# Patient Record
Sex: FEMALE | Race: WHITE | Employment: FULL TIME | URBAN - NONMETROPOLITAN AREA
[De-identification: names, ages, dates, MRNs, and addresses within clinical notes are randomized per-mention and may not be internally consistent; named-entity substitution may affect disease eponyms.]

---

## 2017-01-11 DIAGNOSIS — E78.2 MIXED HYPERLIPIDEMIA: ICD-10-CM

## 2017-01-11 RX ORDER — HYDROCODONE BITARTRATE AND ACETAMINOPHEN 5; 325 MG/1; MG/1
1 TABLET ORAL 2 TIMES DAILY PRN
Qty: 60 TABLET | Refills: 0 | Status: SHIPPED | OUTPATIENT
Start: 2017-01-11 | End: 2017-02-10 | Stop reason: SDUPTHER

## 2017-01-11 RX ORDER — SIMVASTATIN 20 MG
20 TABLET ORAL NIGHTLY
Qty: 30 TABLET | Refills: 5 | Status: CANCELLED | OUTPATIENT
Start: 2017-01-11

## 2017-01-11 RX ORDER — ALPRAZOLAM 1 MG/1
1 TABLET ORAL 2 TIMES DAILY PRN
Qty: 60 TABLET | Refills: 0 | Status: SHIPPED | OUTPATIENT
Start: 2017-01-11 | End: 2017-02-10 | Stop reason: SDUPTHER

## 2017-02-10 RX ORDER — ALPRAZOLAM 1 MG/1
1 TABLET ORAL 2 TIMES DAILY PRN
Qty: 60 TABLET | Refills: 0 | Status: SHIPPED | OUTPATIENT
Start: 2017-02-10 | End: 2017-03-10 | Stop reason: SDUPTHER

## 2017-02-10 RX ORDER — HYDROCODONE BITARTRATE AND ACETAMINOPHEN 5; 325 MG/1; MG/1
1 TABLET ORAL 2 TIMES DAILY PRN
Qty: 60 TABLET | Refills: 0 | Status: SHIPPED | OUTPATIENT
Start: 2017-02-10 | End: 2017-03-10 | Stop reason: SDUPTHER

## 2017-03-10 RX ORDER — ALPRAZOLAM 1 MG/1
1 TABLET ORAL 2 TIMES DAILY PRN
Qty: 60 TABLET | Refills: 0 | Status: SHIPPED | OUTPATIENT
Start: 2017-03-10 | End: 2017-04-11 | Stop reason: SDUPTHER

## 2017-03-10 RX ORDER — HYDROCODONE BITARTRATE AND ACETAMINOPHEN 5; 325 MG/1; MG/1
1 TABLET ORAL 2 TIMES DAILY PRN
Qty: 60 TABLET | Refills: 0 | Status: SHIPPED | OUTPATIENT
Start: 2017-03-10 | End: 2017-04-10 | Stop reason: SDUPTHER

## 2017-03-31 ENCOUNTER — OFFICE VISIT (OUTPATIENT)
Dept: FAMILY MEDICINE CLINIC | Age: 52
End: 2017-03-31
Payer: COMMERCIAL

## 2017-03-31 VITALS
WEIGHT: 165 LBS | SYSTOLIC BLOOD PRESSURE: 116 MMHG | TEMPERATURE: 98.1 F | DIASTOLIC BLOOD PRESSURE: 70 MMHG | HEART RATE: 68 BPM | HEIGHT: 61 IN | BODY MASS INDEX: 31.15 KG/M2

## 2017-03-31 DIAGNOSIS — F33.1 MODERATE EPISODE OF RECURRENT MAJOR DEPRESSIVE DISORDER (HCC): Primary | ICD-10-CM

## 2017-03-31 DIAGNOSIS — R53.83 FATIGUE DUE TO DEPRESSION: ICD-10-CM

## 2017-03-31 DIAGNOSIS — J06.9 ACUTE URI: ICD-10-CM

## 2017-03-31 DIAGNOSIS — Z12.4 ENCOUNTER FOR PAP CERVICAL SMEAR FOLLOWING PRIOR ABNORMAL SMEAR: ICD-10-CM

## 2017-03-31 DIAGNOSIS — F32.A FATIGUE DUE TO DEPRESSION: ICD-10-CM

## 2017-03-31 PROCEDURE — 99214 OFFICE O/P EST MOD 30 MIN: CPT | Performed by: FAMILY MEDICINE

## 2017-03-31 RX ORDER — PAROXETINE HYDROCHLORIDE 20 MG/1
20 TABLET, FILM COATED ORAL DAILY
Qty: 30 TABLET | Refills: 5 | Status: SHIPPED | OUTPATIENT
Start: 2017-03-31 | End: 2017-07-12 | Stop reason: SDUPTHER

## 2017-03-31 ASSESSMENT — ENCOUNTER SYMPTOMS
GASTROINTESTINAL NEGATIVE: 1
COUGH: 1
SORE THROAT: 1
ALLERGIC/IMMUNOLOGIC NEGATIVE: 1
EYES NEGATIVE: 1
RHINORRHEA: 1

## 2017-04-10 RX ORDER — ALPRAZOLAM 1 MG/1
1 TABLET ORAL 2 TIMES DAILY PRN
Qty: 60 TABLET | Refills: 0 | Status: CANCELLED | OUTPATIENT
Start: 2017-04-10

## 2017-04-10 RX ORDER — HYDROCODONE BITARTRATE AND ACETAMINOPHEN 5; 325 MG/1; MG/1
1 TABLET ORAL 2 TIMES DAILY PRN
Qty: 60 TABLET | Refills: 0 | Status: SHIPPED | OUTPATIENT
Start: 2017-04-10 | End: 2017-05-09 | Stop reason: SDUPTHER

## 2017-04-12 RX ORDER — ALPRAZOLAM 1 MG/1
1 TABLET ORAL 2 TIMES DAILY PRN
Qty: 60 TABLET | Refills: 0 | Status: SHIPPED | OUTPATIENT
Start: 2017-04-12 | End: 2017-05-09 | Stop reason: SDUPTHER

## 2017-05-09 RX ORDER — ALPRAZOLAM 1 MG/1
1 TABLET ORAL 2 TIMES DAILY PRN
Qty: 60 TABLET | Refills: 0 | Status: SHIPPED | OUTPATIENT
Start: 2017-05-09 | End: 2017-06-09 | Stop reason: SDUPTHER

## 2017-05-09 RX ORDER — HYDROCODONE BITARTRATE AND ACETAMINOPHEN 5; 325 MG/1; MG/1
1 TABLET ORAL 2 TIMES DAILY PRN
Qty: 60 TABLET | Refills: 0 | Status: SHIPPED | OUTPATIENT
Start: 2017-05-09 | End: 2017-06-09 | Stop reason: SDUPTHER

## 2017-06-09 RX ORDER — PAROXETINE HYDROCHLORIDE 20 MG/1
20 TABLET, FILM COATED ORAL DAILY
Qty: 30 TABLET | Refills: 5 | Status: CANCELLED | OUTPATIENT
Start: 2017-06-09

## 2017-06-10 RX ORDER — ALPRAZOLAM 1 MG/1
1 TABLET ORAL 2 TIMES DAILY PRN
Qty: 60 TABLET | Refills: 0 | Status: SHIPPED | OUTPATIENT
Start: 2017-06-10 | End: 2017-07-12 | Stop reason: SDUPTHER

## 2017-06-10 RX ORDER — HYDROCODONE BITARTRATE AND ACETAMINOPHEN 5; 325 MG/1; MG/1
1 TABLET ORAL 2 TIMES DAILY PRN
Qty: 60 TABLET | Refills: 0 | Status: SHIPPED | OUTPATIENT
Start: 2017-06-10 | End: 2017-07-12 | Stop reason: SDUPTHER

## 2017-07-07 ENCOUNTER — OFFICE VISIT (OUTPATIENT)
Dept: CARDIOLOGY | Age: 52
End: 2017-07-07
Payer: COMMERCIAL

## 2017-07-07 VITALS
SYSTOLIC BLOOD PRESSURE: 126 MMHG | DIASTOLIC BLOOD PRESSURE: 86 MMHG | HEIGHT: 61 IN | WEIGHT: 161 LBS | BODY MASS INDEX: 30.4 KG/M2 | HEART RATE: 56 BPM | RESPIRATION RATE: 18 BRPM

## 2017-07-07 DIAGNOSIS — I10 ESSENTIAL HYPERTENSION: Primary | ICD-10-CM

## 2017-07-07 PROCEDURE — 99212 OFFICE O/P EST SF 10 MIN: CPT | Performed by: INTERNAL MEDICINE

## 2017-07-07 PROCEDURE — 93000 ELECTROCARDIOGRAM COMPLETE: CPT | Performed by: INTERNAL MEDICINE

## 2017-07-12 ENCOUNTER — TELEPHONE (OUTPATIENT)
Dept: SURGERY | Age: 52
End: 2017-07-12

## 2017-07-12 ENCOUNTER — OFFICE VISIT (OUTPATIENT)
Dept: FAMILY MEDICINE CLINIC | Age: 52
End: 2017-07-12
Payer: COMMERCIAL

## 2017-07-12 VITALS
DIASTOLIC BLOOD PRESSURE: 68 MMHG | HEART RATE: 68 BPM | HEIGHT: 61 IN | SYSTOLIC BLOOD PRESSURE: 116 MMHG | BODY MASS INDEX: 30.78 KG/M2 | WEIGHT: 163 LBS

## 2017-07-12 DIAGNOSIS — R87.613 HGSIL ON PAP SMEAR OF CERVIX: ICD-10-CM

## 2017-07-12 DIAGNOSIS — E78.2 MIXED HYPERLIPIDEMIA: ICD-10-CM

## 2017-07-12 DIAGNOSIS — Z12.11 COLON CANCER SCREENING: ICD-10-CM

## 2017-07-12 DIAGNOSIS — Z12.31 VISIT FOR SCREENING MAMMOGRAM: ICD-10-CM

## 2017-07-12 DIAGNOSIS — G89.29 CHRONIC BILATERAL LOW BACK PAIN WITHOUT SCIATICA: ICD-10-CM

## 2017-07-12 DIAGNOSIS — F33.42 RECURRENT MAJOR DEPRESSIVE DISORDER, IN FULL REMISSION (HCC): ICD-10-CM

## 2017-07-12 DIAGNOSIS — Z72.0 TOBACCO ABUSE: Primary | ICD-10-CM

## 2017-07-12 DIAGNOSIS — F41.9 ANXIETY: ICD-10-CM

## 2017-07-12 DIAGNOSIS — M54.50 CHRONIC BILATERAL LOW BACK PAIN WITHOUT SCIATICA: ICD-10-CM

## 2017-07-12 DIAGNOSIS — B36.9 FUNGAL RASH OF TRUNK: ICD-10-CM

## 2017-07-12 PROCEDURE — 99214 OFFICE O/P EST MOD 30 MIN: CPT | Performed by: FAMILY MEDICINE

## 2017-07-12 RX ORDER — ALPRAZOLAM 1 MG/1
1 TABLET ORAL 2 TIMES DAILY PRN
Qty: 60 TABLET | Refills: 0 | Status: SHIPPED | OUTPATIENT
Start: 2017-07-12 | End: 2017-08-11 | Stop reason: SDUPTHER

## 2017-07-12 RX ORDER — SIMVASTATIN 20 MG
20 TABLET ORAL NIGHTLY
Qty: 30 TABLET | Refills: 5 | Status: SHIPPED | OUTPATIENT
Start: 2017-07-12 | End: 2018-02-26 | Stop reason: SDUPTHER

## 2017-07-12 RX ORDER — CLOTRIMAZOLE AND BETAMETHASONE DIPROPIONATE 10; .64 MG/G; MG/G
CREAM TOPICAL
Qty: 45 G | Refills: 1 | Status: SHIPPED | OUTPATIENT
Start: 2017-07-12 | End: 2018-02-26 | Stop reason: SDUPTHER

## 2017-07-12 RX ORDER — HYDROCODONE BITARTRATE AND ACETAMINOPHEN 5; 325 MG/1; MG/1
1 TABLET ORAL 2 TIMES DAILY PRN
Qty: 60 TABLET | Refills: 0 | Status: SHIPPED | OUTPATIENT
Start: 2017-07-12 | End: 2017-08-11 | Stop reason: SDUPTHER

## 2017-07-12 RX ORDER — PAROXETINE HYDROCHLORIDE 20 MG/1
20 TABLET, FILM COATED ORAL DAILY
Qty: 30 TABLET | Refills: 5 | Status: SHIPPED | OUTPATIENT
Start: 2017-07-12 | End: 2017-08-11 | Stop reason: SDUPTHER

## 2017-07-12 ASSESSMENT — PATIENT HEALTH QUESTIONNAIRE - PHQ9
1. LITTLE INTEREST OR PLEASURE IN DOING THINGS: 0
SUM OF ALL RESPONSES TO PHQ9 QUESTIONS 1 & 2: 0
SUM OF ALL RESPONSES TO PHQ QUESTIONS 1-9: 0
2. FEELING DOWN, DEPRESSED OR HOPELESS: 0

## 2017-07-12 ASSESSMENT — ENCOUNTER SYMPTOMS
EYES NEGATIVE: 1
GASTROINTESTINAL NEGATIVE: 1
RESPIRATORY NEGATIVE: 1
ALLERGIC/IMMUNOLOGIC NEGATIVE: 1

## 2017-08-07 DIAGNOSIS — R92.8 ABNORMAL MAMMOGRAM: Primary | ICD-10-CM

## 2017-08-11 RX ORDER — HYDROCODONE BITARTRATE AND ACETAMINOPHEN 5; 325 MG/1; MG/1
1 TABLET ORAL 2 TIMES DAILY PRN
Qty: 60 TABLET | Refills: 0 | Status: SHIPPED | OUTPATIENT
Start: 2017-08-11 | End: 2017-09-08 | Stop reason: SDUPTHER

## 2017-08-11 RX ORDER — ALPRAZOLAM 1 MG/1
1 TABLET ORAL 2 TIMES DAILY PRN
Qty: 60 TABLET | Refills: 0 | Status: SHIPPED | OUTPATIENT
Start: 2017-08-11 | End: 2017-09-08 | Stop reason: SDUPTHER

## 2017-08-11 RX ORDER — PAROXETINE HYDROCHLORIDE 20 MG/1
20 TABLET, FILM COATED ORAL DAILY
Qty: 30 TABLET | Refills: 5 | Status: SHIPPED | OUTPATIENT
Start: 2017-08-11 | End: 2018-02-26 | Stop reason: SDUPTHER

## 2017-09-11 RX ORDER — HYDROCODONE BITARTRATE AND ACETAMINOPHEN 5; 325 MG/1; MG/1
1 TABLET ORAL 2 TIMES DAILY PRN
Qty: 60 TABLET | Refills: 0 | Status: SHIPPED | OUTPATIENT
Start: 2017-09-11 | End: 2017-10-11 | Stop reason: SDUPTHER

## 2017-09-11 RX ORDER — ALPRAZOLAM 1 MG/1
1 TABLET ORAL 2 TIMES DAILY PRN
Qty: 60 TABLET | Refills: 0 | Status: SHIPPED | OUTPATIENT
Start: 2017-09-11 | End: 2017-10-11 | Stop reason: SDUPTHER

## 2017-10-11 DIAGNOSIS — G89.29 CHRONIC BILATERAL LOW BACK PAIN WITHOUT SCIATICA: ICD-10-CM

## 2017-10-11 DIAGNOSIS — M54.50 CHRONIC BILATERAL LOW BACK PAIN WITHOUT SCIATICA: ICD-10-CM

## 2017-10-11 DIAGNOSIS — F41.9 ANXIETY: Primary | ICD-10-CM

## 2017-10-11 RX ORDER — ALPRAZOLAM 1 MG/1
1 TABLET ORAL 2 TIMES DAILY PRN
Qty: 60 TABLET | Refills: 0 | Status: SHIPPED | OUTPATIENT
Start: 2017-10-11 | End: 2017-11-10 | Stop reason: SDUPTHER

## 2017-10-11 RX ORDER — HYDROCODONE BITARTRATE AND ACETAMINOPHEN 5; 325 MG/1; MG/1
1 TABLET ORAL 2 TIMES DAILY PRN
Qty: 60 TABLET | Refills: 0 | Status: SHIPPED | OUTPATIENT
Start: 2017-10-11 | End: 2017-11-10 | Stop reason: SDUPTHER

## 2017-10-11 NOTE — TELEPHONE ENCOUNTER
OARRS printed for review-last fill of both meds 09/11/17  #60/30days  Last OV-07/12/17  No upcoming appt

## 2017-11-10 DIAGNOSIS — G89.29 CHRONIC BILATERAL LOW BACK PAIN WITHOUT SCIATICA: ICD-10-CM

## 2017-11-10 DIAGNOSIS — F41.9 ANXIETY: ICD-10-CM

## 2017-11-10 DIAGNOSIS — M54.50 CHRONIC BILATERAL LOW BACK PAIN WITHOUT SCIATICA: ICD-10-CM

## 2017-11-10 RX ORDER — ALPRAZOLAM 1 MG/1
1 TABLET ORAL 2 TIMES DAILY PRN
Qty: 60 TABLET | Refills: 0 | Status: SHIPPED | OUTPATIENT
Start: 2017-11-10 | End: 2017-12-11 | Stop reason: SDUPTHER

## 2017-11-10 RX ORDER — HYDROCODONE BITARTRATE AND ACETAMINOPHEN 5; 325 MG/1; MG/1
1 TABLET ORAL 2 TIMES DAILY PRN
Qty: 60 TABLET | Refills: 0 | Status: SHIPPED | OUTPATIENT
Start: 2017-11-10 | End: 2017-12-11 | Stop reason: SDUPTHER

## 2017-11-10 NOTE — TELEPHONE ENCOUNTER
[place] from [place] reviewed. Braulio Glynn last filled 10/11/17 #60 for a 30 day supply, Xanax last filled 10/11/17 #60 for a 30 day supply. Report available for your review. Last OV 7/12/17; no future OV scheduled.

## 2017-12-11 DIAGNOSIS — G89.29 CHRONIC BILATERAL LOW BACK PAIN WITHOUT SCIATICA: ICD-10-CM

## 2017-12-11 DIAGNOSIS — F41.9 ANXIETY: ICD-10-CM

## 2017-12-11 DIAGNOSIS — M54.50 CHRONIC BILATERAL LOW BACK PAIN WITHOUT SCIATICA: ICD-10-CM

## 2017-12-11 RX ORDER — HYDROCODONE BITARTRATE AND ACETAMINOPHEN 5; 325 MG/1; MG/1
1 TABLET ORAL 2 TIMES DAILY PRN
Qty: 60 TABLET | Refills: 0 | Status: SHIPPED | OUTPATIENT
Start: 2017-12-11 | End: 2018-01-10 | Stop reason: SDUPTHER

## 2017-12-11 RX ORDER — ALPRAZOLAM 1 MG/1
1 TABLET ORAL 2 TIMES DAILY PRN
Qty: 60 TABLET | Refills: 0 | Status: SHIPPED | OUTPATIENT
Start: 2017-12-11 | End: 2018-01-10 | Stop reason: SDUPTHER

## 2017-12-11 NOTE — TELEPHONE ENCOUNTER
OARRS printed for review   Last fill 11/10/17  #60/30days on both medications   Lat OV-07/21/17  Next OV-no upcoming

## 2018-01-10 DIAGNOSIS — M54.50 CHRONIC BILATERAL LOW BACK PAIN WITHOUT SCIATICA: ICD-10-CM

## 2018-01-10 DIAGNOSIS — F41.9 ANXIETY: ICD-10-CM

## 2018-01-10 DIAGNOSIS — G89.29 CHRONIC BILATERAL LOW BACK PAIN WITHOUT SCIATICA: ICD-10-CM

## 2018-01-10 RX ORDER — ALPRAZOLAM 1 MG/1
1 TABLET ORAL 2 TIMES DAILY PRN
Qty: 60 TABLET | Refills: 0 | Status: SHIPPED | OUTPATIENT
Start: 2018-01-10 | End: 2018-02-12 | Stop reason: SDUPTHER

## 2018-01-10 RX ORDER — HYDROCODONE BITARTRATE AND ACETAMINOPHEN 5; 325 MG/1; MG/1
1 TABLET ORAL 2 TIMES DAILY PRN
Qty: 60 TABLET | Refills: 0 | Status: SHIPPED | OUTPATIENT
Start: 2018-01-10 | End: 2018-02-12 | Stop reason: SDUPTHER

## 2018-01-10 NOTE — TELEPHONE ENCOUNTER
[place] from [place] reviewed. Alprazolam last filled 12/11/17 #60 for a 30 day supply; Durham Alivia last filled 12/11/17 #60 for a 30 day supply. Report available for your review. Last OV 7/12/17; no future OV scheduled.

## 2018-01-15 ENCOUNTER — TELEPHONE (OUTPATIENT)
Dept: FAMILY MEDICINE CLINIC | Age: 53
End: 2018-01-15

## 2018-01-15 NOTE — TELEPHONE ENCOUNTER
Pt calling stating that the pharmacy told her she can not  her alprazolam until a prior auth completed. [facility] states they will fax the prior auth.

## 2018-02-06 ENCOUNTER — HOSPITAL ENCOUNTER (OUTPATIENT)
Dept: LAB | Age: 53
Setting detail: SPECIMEN
Discharge: HOME | End: 2018-02-06
Payer: COMMERCIAL

## 2018-02-06 DIAGNOSIS — E78.2 MIXED HYPERLIPIDEMIA: ICD-10-CM

## 2018-02-06 LAB
ABSOLUTE EOS #: 0.22 K/UL (ref 0–0.4)
ABSOLUTE IMMATURE GRANULOCYTE: NORMAL K/UL (ref 0–0.3)
ABSOLUTE LYMPH #: 2.31 K/UL (ref 1–4.8)
ABSOLUTE MONO #: 0.55 K/UL (ref 0.1–1.2)
ANION GAP SERPL CALCULATED.3IONS-SCNC: 15 MMOL/L (ref 9–17)
BASOPHILS # BLD: 1 % (ref 0–1)
BASOPHILS ABSOLUTE: 0.04 K/UL (ref 0–0.2)
BUN BLDV-MCNC: 14 MG/DL (ref 6–20)
BUN/CREAT BLD: 19 (ref 9–20)
CALCIUM SERPL-MCNC: 9.4 MG/DL (ref 8.6–10.4)
CHLORIDE BLD-SCNC: 102 MMOL/L (ref 98–107)
CHOLESTEROL/HDL RATIO: 4.1
CHOLESTEROL: 261 MG/DL
CO2: 23 MMOL/L (ref 20–31)
CREAT SERPL-MCNC: 0.73 MG/DL (ref 0.5–0.9)
DIFFERENTIAL TYPE: NORMAL
EOSINOPHILS RELATIVE PERCENT: 4 % (ref 1–7)
GFR AFRICAN AMERICAN: >60 ML/MIN
GFR NON-AFRICAN AMERICAN: >60 ML/MIN
GFR SERPL CREATININE-BSD FRML MDRD: ABNORMAL ML/MIN/{1.73_M2}
GFR SERPL CREATININE-BSD FRML MDRD: ABNORMAL ML/MIN/{1.73_M2}
GLUCOSE BLD-MCNC: 100 MG/DL (ref 70–99)
HCT VFR BLD CALC: 44.5 % (ref 36–46)
HDLC SERPL-MCNC: 63 MG/DL
HEMOGLOBIN: 15.2 G/DL (ref 12–16)
IMMATURE GRANULOCYTES: NORMAL %
LDL CHOLESTEROL: 163 MG/DL (ref 0–130)
LYMPHOCYTES # BLD: 38 % (ref 16–46)
MCH RBC QN AUTO: 33.2 PG (ref 26–34)
MCHC RBC AUTO-ENTMCNC: 34.1 G/DL (ref 31–37)
MCV RBC AUTO: 97.3 FL (ref 80–100)
MONOCYTES # BLD: 9 % (ref 4–11)
NRBC AUTOMATED: NORMAL PER 100 WBC
PDW BLD-RTO: 11.8 % (ref 11–14.5)
PLATELET # BLD: 296 K/UL (ref 140–450)
PLATELET ESTIMATE: NORMAL
PMV BLD AUTO: 7.6 FL (ref 6–12)
POTASSIUM SERPL-SCNC: 4.2 MMOL/L (ref 3.7–5.3)
RBC # BLD: 4.58 M/UL (ref 4–5.2)
RBC # BLD: NORMAL 10*6/UL
SEG NEUTROPHILS: 48 % (ref 43–77)
SEGMENTED NEUTROPHILS ABSOLUTE COUNT: 2.9 K/UL (ref 1.8–7.7)
SODIUM BLD-SCNC: 140 MMOL/L (ref 135–144)
TRIGL SERPL-MCNC: 177 MG/DL
VLDLC SERPL CALC-MCNC: ABNORMAL MG/DL (ref 1–30)
WBC # BLD: 6 K/UL (ref 3.5–11)
WBC # BLD: NORMAL 10*3/UL

## 2018-02-06 PROCEDURE — 80048 BASIC METABOLIC PNL TOTAL CA: CPT

## 2018-02-06 PROCEDURE — 80061 LIPID PANEL: CPT

## 2018-02-06 PROCEDURE — 36415 COLL VENOUS BLD VENIPUNCTURE: CPT

## 2018-02-06 PROCEDURE — 85025 COMPLETE CBC W/AUTO DIFF WBC: CPT

## 2018-02-12 DIAGNOSIS — F41.9 ANXIETY: ICD-10-CM

## 2018-02-12 DIAGNOSIS — M54.50 CHRONIC BILATERAL LOW BACK PAIN WITHOUT SCIATICA: ICD-10-CM

## 2018-02-12 DIAGNOSIS — G89.29 CHRONIC BILATERAL LOW BACK PAIN WITHOUT SCIATICA: ICD-10-CM

## 2018-02-12 RX ORDER — ALPRAZOLAM 1 MG/1
1 TABLET ORAL 2 TIMES DAILY PRN
Qty: 60 TABLET | Refills: 0 | Status: SHIPPED | OUTPATIENT
Start: 2018-02-12 | End: 2018-03-14 | Stop reason: SDUPTHER

## 2018-02-12 RX ORDER — HYDROCODONE BITARTRATE AND ACETAMINOPHEN 5; 325 MG/1; MG/1
1 TABLET ORAL 2 TIMES DAILY PRN
Qty: 60 TABLET | Refills: 0 | Status: SHIPPED | OUTPATIENT
Start: 2018-02-12 | End: 2018-03-14 | Stop reason: SDUPTHER

## 2018-02-26 ENCOUNTER — OFFICE VISIT (OUTPATIENT)
Dept: FAMILY MEDICINE CLINIC | Age: 53
End: 2018-02-26
Payer: COMMERCIAL

## 2018-02-26 VITALS
SYSTOLIC BLOOD PRESSURE: 124 MMHG | WEIGHT: 168.6 LBS | HEART RATE: 72 BPM | DIASTOLIC BLOOD PRESSURE: 70 MMHG | RESPIRATION RATE: 16 BRPM | HEIGHT: 61 IN | BODY MASS INDEX: 31.83 KG/M2

## 2018-02-26 DIAGNOSIS — B35.6 TINEA CRURIS: ICD-10-CM

## 2018-02-26 DIAGNOSIS — G89.29 CHRONIC BILATERAL LOW BACK PAIN WITHOUT SCIATICA: ICD-10-CM

## 2018-02-26 DIAGNOSIS — E78.2 MIXED HYPERLIPIDEMIA: ICD-10-CM

## 2018-02-26 DIAGNOSIS — M54.50 CHRONIC BILATERAL LOW BACK PAIN WITHOUT SCIATICA: ICD-10-CM

## 2018-02-26 DIAGNOSIS — Z72.0 TOBACCO ABUSE: Primary | ICD-10-CM

## 2018-02-26 DIAGNOSIS — R87.613 HGSIL ON PAP SMEAR OF CERVIX: ICD-10-CM

## 2018-02-26 DIAGNOSIS — F41.9 ANXIETY: ICD-10-CM

## 2018-02-26 PROCEDURE — G8427 DOCREV CUR MEDS BY ELIG CLIN: HCPCS | Performed by: FAMILY MEDICINE

## 2018-02-26 PROCEDURE — 99214 OFFICE O/P EST MOD 30 MIN: CPT | Performed by: FAMILY MEDICINE

## 2018-02-26 PROCEDURE — G8417 CALC BMI ABV UP PARAM F/U: HCPCS | Performed by: FAMILY MEDICINE

## 2018-02-26 PROCEDURE — G8484 FLU IMMUNIZE NO ADMIN: HCPCS | Performed by: FAMILY MEDICINE

## 2018-02-26 PROCEDURE — 3014F SCREEN MAMMO DOC REV: CPT | Performed by: FAMILY MEDICINE

## 2018-02-26 PROCEDURE — 3017F COLORECTAL CA SCREEN DOC REV: CPT | Performed by: FAMILY MEDICINE

## 2018-02-26 PROCEDURE — 4004F PT TOBACCO SCREEN RCVD TLK: CPT | Performed by: FAMILY MEDICINE

## 2018-02-26 RX ORDER — SIMVASTATIN 20 MG
20 TABLET ORAL NIGHTLY
Qty: 90 TABLET | Refills: 1 | Status: SHIPPED | OUTPATIENT
Start: 2018-02-26 | End: 2018-04-13 | Stop reason: SDUPTHER

## 2018-02-26 RX ORDER — PAROXETINE HYDROCHLORIDE 20 MG/1
20 TABLET, FILM COATED ORAL DAILY
Qty: 90 TABLET | Refills: 1 | Status: SHIPPED | OUTPATIENT
Start: 2018-02-26 | End: 2018-04-13 | Stop reason: SDUPTHER

## 2018-02-26 RX ORDER — CLOTRIMAZOLE AND BETAMETHASONE DIPROPIONATE 10; .64 MG/G; MG/G
CREAM TOPICAL
Qty: 45 G | Refills: 1 | Status: SHIPPED | OUTPATIENT
Start: 2018-02-26 | End: 2018-04-13 | Stop reason: SDUPTHER

## 2018-02-26 RX ORDER — VARENICLINE TARTRATE 25 MG
KIT ORAL
Qty: 53 TABLET | Refills: 2 | Status: SHIPPED | OUTPATIENT
Start: 2018-02-26 | End: 2018-04-13 | Stop reason: ALTCHOICE

## 2018-02-26 ASSESSMENT — ENCOUNTER SYMPTOMS
GASTROINTESTINAL NEGATIVE: 1
EYES NEGATIVE: 1
RESPIRATORY NEGATIVE: 1
ALLERGIC/IMMUNOLOGIC NEGATIVE: 1

## 2018-02-26 NOTE — PROGRESS NOTES
Tobacco Use Visit Information:    Have you changed or started any medications since your last visit including any over-the-counter medicines, vitamins, or herbal medicines? no   Are you having any side effects from any of your medications? -  no  Have you stopped taking any of your medications? Is so, why? -  no    Have you seen any other physician or provider since your last visit? No  Have you had any other diagnostic tests since your last visit? No  Have you been seen in the emergency room and/or had an admission to a hospital since we last saw you? No  Have you had your routine dental cleaning in the past 6 months? yes     Have you activated your [employer] account? If not, what are your barriers?  Yes     Patient Care Team:  Juan Cruz MD as PCP - General (Family Medicine)    Current Tobacco Use    History   Smoking Status    Current Every Day Smoker    Packs/day: 1.00    Years: 37.00    Types: Cigarettes   Smokeless Tobacco    Never Used     Ready to quit: Yes  Counseling given: Yes     Are you motivated to quit? - yes  If yes, have you set a date to quite? - no    Have you tried any anti-smoking aids in the past? -  no     1-800-QUIT-NOW (1-331.534.3663)     Medical History Review  Past Medical, Family, and Social History reviewed and does contribute to the patient presenting condition    Health Maintenance   Topic Date Due    Colon cancer screen colonoscopy  02/22/2015    Cervical cancer screen  09/19/2016    DTaP/Tdap/Td vaccine (1 - Tdap) 03/03/2018 (Originally 2/22/1984)    Shingles Vaccine (1 of 2 - 2 Dose Series) 03/31/2018 (Originally 2/22/2015)    Flu vaccine (1) 04/30/2018 (Originally 9/1/2017)    Breast cancer screen  08/11/2019    Lipid screen  02/06/2023    Pneumococcal med risk  Completed    Hepatitis C screen  Completed    HIV screen  Completed

## 2018-02-26 NOTE — PROGRESS NOTES
Federico Romo received counseling on the following healthy behaviors: medication adherence  Reviewed prior labs and health maintenance  Continue current medications, diet and exercise. Discussed use, benefit, and side effects of prescribed medications. Barriers to medication compliance addressed. Patient given educational materials - see patient instructions  Was a self-tracking handout given in paper form or via [employer]? Yes    Requested Prescriptions     Pending Prescriptions Disp Refills    clotrimazole-betamethasone (LOTRISONE) 1-0.05 % cream 45 g 1     Sig: Apply topically 2 times daily.  PARoxetine (PAXIL) 20 MG tablet 90 tablet 1     Sig: Take 1 tablet by mouth daily    simvastatin (ZOCOR) 20 MG tablet 90 tablet 1     Sig: Take 1 tablet by mouth nightly       All patient questions answered. Patient voiced understanding. Quality Measures    Body mass index is 31.86 kg/m². Normal. Weight control planned discussed Healthy diet and regular exercise. BP: 124/70 Blood pressure is normal. Treatment plan consists of No treatment change needed.     Lab Results   Component Value Date    LDLCHOLESTEROL 163 (H) 02/06/2018    (goal LDL reduction with dx if diabetes is 50% LDL reduction)      PHQ Scores 7/12/2017   PHQ2 Score 0   PHQ9 Score 0     Interpretation of Total Score Depression Severity: 1-4 = Minimal depression, 5-9 = Mild depression, 10-14 = Moderate depression, 15-19 = Moderately severe depression, 20-27 = Severe depression
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
## 2018-03-14 DIAGNOSIS — G89.29 CHRONIC BILATERAL LOW BACK PAIN WITHOUT SCIATICA: ICD-10-CM

## 2018-03-14 DIAGNOSIS — M54.50 CHRONIC BILATERAL LOW BACK PAIN WITHOUT SCIATICA: ICD-10-CM

## 2018-03-14 DIAGNOSIS — F41.9 ANXIETY: ICD-10-CM

## 2018-03-15 ENCOUNTER — TELEPHONE (OUTPATIENT)
Dept: FAMILY MEDICINE CLINIC | Age: 53
End: 2018-03-15

## 2018-03-15 RX ORDER — HYDROCODONE BITARTRATE AND ACETAMINOPHEN 5; 325 MG/1; MG/1
1 TABLET ORAL 2 TIMES DAILY PRN
Qty: 60 TABLET | Refills: 0 | Status: SHIPPED | OUTPATIENT
Start: 2018-03-15 | End: 2018-04-13 | Stop reason: SDUPTHER

## 2018-03-15 RX ORDER — ALPRAZOLAM 1 MG/1
1 TABLET ORAL 2 TIMES DAILY PRN
Qty: 60 TABLET | Refills: 0 | Status: SHIPPED | OUTPATIENT
Start: 2018-03-15 | End: 2018-04-13 | Stop reason: SDUPTHER

## 2018-03-26 ENCOUNTER — PATIENT MESSAGE (OUTPATIENT)
Dept: FAMILY MEDICINE CLINIC | Age: 53
End: 2018-03-26

## 2018-03-26 RX ORDER — VARENICLINE TARTRATE 1 MG/1
1 TABLET, FILM COATED ORAL 2 TIMES DAILY
Qty: 60 TABLET | Refills: 2 | Status: SHIPPED | OUTPATIENT
Start: 2018-03-26 | End: 2019-05-28 | Stop reason: ALTCHOICE

## 2018-03-26 RX ORDER — VARENICLINE TARTRATE 25 MG
KIT ORAL
Qty: 53 TABLET | Refills: 2 | Status: CANCELLED | OUTPATIENT
Start: 2018-03-26

## 2018-03-26 RX ORDER — VARENICLINE TARTRATE 1 MG/1
1 TABLET, FILM COATED ORAL 2 TIMES DAILY
Qty: 60 TABLET | Refills: 0 | Status: SHIPPED | OUTPATIENT
Start: 2018-03-26 | End: 2018-03-26 | Stop reason: SDUPTHER

## 2018-03-26 NOTE — TELEPHONE ENCOUNTER
From: Néstor Lundberg  To: Xena Valenzuela MD  Sent: 3/26/2018 6:43 AM EDT  Subject: Prescription Question    Is there any way I can get the chantex another month I haven't had a cigarette since march 1 I think one more month I will be smoke free?

## 2018-04-13 ENCOUNTER — OFFICE VISIT (OUTPATIENT)
Dept: FAMILY MEDICINE CLINIC | Age: 53
End: 2018-04-13
Payer: COMMERCIAL

## 2018-04-13 VITALS
DIASTOLIC BLOOD PRESSURE: 74 MMHG | SYSTOLIC BLOOD PRESSURE: 120 MMHG | RESPIRATION RATE: 16 BRPM | HEART RATE: 64 BPM | BODY MASS INDEX: 31.83 KG/M2 | HEIGHT: 62 IN | WEIGHT: 173 LBS

## 2018-04-13 DIAGNOSIS — R21 RASH AND NONSPECIFIC SKIN ERUPTION: ICD-10-CM

## 2018-04-13 DIAGNOSIS — J02.9 SORE THROAT: Primary | ICD-10-CM

## 2018-04-13 DIAGNOSIS — G89.29 CHRONIC BILATERAL LOW BACK PAIN WITHOUT SCIATICA: ICD-10-CM

## 2018-04-13 DIAGNOSIS — M54.50 CHRONIC BILATERAL LOW BACK PAIN WITHOUT SCIATICA: ICD-10-CM

## 2018-04-13 DIAGNOSIS — K21.00 GASTROESOPHAGEAL REFLUX DISEASE WITH ESOPHAGITIS: ICD-10-CM

## 2018-04-13 PROCEDURE — G8427 DOCREV CUR MEDS BY ELIG CLIN: HCPCS | Performed by: FAMILY MEDICINE

## 2018-04-13 PROCEDURE — 1036F TOBACCO NON-USER: CPT | Performed by: FAMILY MEDICINE

## 2018-04-13 PROCEDURE — G8417 CALC BMI ABV UP PARAM F/U: HCPCS | Performed by: FAMILY MEDICINE

## 2018-04-13 PROCEDURE — 99213 OFFICE O/P EST LOW 20 MIN: CPT | Performed by: FAMILY MEDICINE

## 2018-04-13 PROCEDURE — 3017F COLORECTAL CA SCREEN DOC REV: CPT | Performed by: FAMILY MEDICINE

## 2018-04-13 PROCEDURE — 3014F SCREEN MAMMO DOC REV: CPT | Performed by: FAMILY MEDICINE

## 2018-04-13 RX ORDER — HYDROCODONE BITARTRATE AND ACETAMINOPHEN 5; 325 MG/1; MG/1
1 TABLET ORAL 2 TIMES DAILY PRN
Qty: 60 TABLET | Refills: 0 | Status: SHIPPED | OUTPATIENT
Start: 2018-04-13 | End: 2018-05-15 | Stop reason: SDUPTHER

## 2018-04-13 RX ORDER — SIMVASTATIN 20 MG
20 TABLET ORAL NIGHTLY
Qty: 90 TABLET | Refills: 1 | Status: SHIPPED | OUTPATIENT
Start: 2018-04-13 | End: 2018-05-15 | Stop reason: SDUPTHER

## 2018-04-13 RX ORDER — ALPRAZOLAM 1 MG/1
1 TABLET ORAL 2 TIMES DAILY PRN
Qty: 60 TABLET | Refills: 0 | Status: SHIPPED | OUTPATIENT
Start: 2018-04-13 | End: 2018-05-15 | Stop reason: SDUPTHER

## 2018-04-13 RX ORDER — CLOTRIMAZOLE AND BETAMETHASONE DIPROPIONATE 10; .64 MG/G; MG/G
CREAM TOPICAL
Qty: 45 G | Refills: 1 | Status: SHIPPED | OUTPATIENT
Start: 2018-04-13 | End: 2018-05-15 | Stop reason: SDUPTHER

## 2018-04-13 RX ORDER — PAROXETINE HYDROCHLORIDE 20 MG/1
20 TABLET, FILM COATED ORAL DAILY
Qty: 90 TABLET | Refills: 1 | Status: SHIPPED | OUTPATIENT
Start: 2018-04-13 | End: 2019-05-28 | Stop reason: ALTCHOICE

## 2018-04-13 ASSESSMENT — ENCOUNTER SYMPTOMS
RESPIRATORY NEGATIVE: 1
VOICE CHANGE: 0
BACK PAIN: 1
GASTROINTESTINAL NEGATIVE: 1
ABDOMINAL PAIN: 0
SINUS PRESSURE: 0
ALLERGIC/IMMUNOLOGIC NEGATIVE: 1
RHINORRHEA: 0
SORE THROAT: 1
TROUBLE SWALLOWING: 0
EYES NEGATIVE: 1

## 2018-05-15 DIAGNOSIS — F41.9 ANXIETY: Primary | ICD-10-CM

## 2018-05-15 DIAGNOSIS — G89.29 CHRONIC BILATERAL LOW BACK PAIN WITHOUT SCIATICA: ICD-10-CM

## 2018-05-15 DIAGNOSIS — M54.50 CHRONIC BILATERAL LOW BACK PAIN WITHOUT SCIATICA: ICD-10-CM

## 2018-05-15 RX ORDER — ALPRAZOLAM 1 MG/1
1 TABLET ORAL 2 TIMES DAILY PRN
Qty: 60 TABLET | Refills: 0 | Status: SHIPPED | OUTPATIENT
Start: 2018-05-15 | End: 2018-06-14 | Stop reason: SDUPTHER

## 2018-05-15 RX ORDER — CLOTRIMAZOLE AND BETAMETHASONE DIPROPIONATE 10; .64 MG/G; MG/G
CREAM TOPICAL
Qty: 45 G | Refills: 1 | Status: SHIPPED | OUTPATIENT
Start: 2018-05-15 | End: 2019-05-28 | Stop reason: SDUPTHER

## 2018-05-15 RX ORDER — SIMVASTATIN 20 MG
20 TABLET ORAL NIGHTLY
Qty: 90 TABLET | Refills: 1 | Status: CANCELLED | OUTPATIENT
Start: 2018-05-15

## 2018-05-15 RX ORDER — CLOTRIMAZOLE AND BETAMETHASONE DIPROPIONATE 10; .64 MG/G; MG/G
CREAM TOPICAL
Qty: 45 G | Refills: 1 | Status: CANCELLED | OUTPATIENT
Start: 2018-05-15

## 2018-05-15 RX ORDER — HYDROCODONE BITARTRATE AND ACETAMINOPHEN 5; 325 MG/1; MG/1
1 TABLET ORAL 2 TIMES DAILY PRN
Qty: 60 TABLET | Refills: 0 | Status: SHIPPED | OUTPATIENT
Start: 2018-05-15 | End: 2018-06-14 | Stop reason: SDUPTHER

## 2018-05-15 RX ORDER — SIMVASTATIN 20 MG
20 TABLET ORAL NIGHTLY
Qty: 90 TABLET | Refills: 1 | Status: SHIPPED | OUTPATIENT
Start: 2018-05-15 | End: 2019-05-28 | Stop reason: ALTCHOICE

## 2018-06-14 DIAGNOSIS — M54.50 CHRONIC BILATERAL LOW BACK PAIN WITHOUT SCIATICA: ICD-10-CM

## 2018-06-14 DIAGNOSIS — G89.29 CHRONIC BILATERAL LOW BACK PAIN WITHOUT SCIATICA: ICD-10-CM

## 2018-06-14 DIAGNOSIS — F41.9 ANXIETY: ICD-10-CM

## 2018-06-14 RX ORDER — HYDROCODONE BITARTRATE AND ACETAMINOPHEN 5; 325 MG/1; MG/1
1 TABLET ORAL 2 TIMES DAILY PRN
Qty: 60 TABLET | Refills: 0 | Status: SHIPPED | OUTPATIENT
Start: 2018-06-14 | End: 2018-07-13 | Stop reason: SDUPTHER

## 2018-06-14 RX ORDER — ALPRAZOLAM 1 MG/1
1 TABLET ORAL 2 TIMES DAILY PRN
Qty: 60 TABLET | Refills: 0 | Status: SHIPPED | OUTPATIENT
Start: 2018-06-14 | End: 2018-07-13 | Stop reason: SDUPTHER

## 2018-07-13 DIAGNOSIS — M54.50 CHRONIC BILATERAL LOW BACK PAIN WITHOUT SCIATICA: ICD-10-CM

## 2018-07-13 DIAGNOSIS — F41.9 ANXIETY: ICD-10-CM

## 2018-07-13 DIAGNOSIS — G89.29 CHRONIC BILATERAL LOW BACK PAIN WITHOUT SCIATICA: ICD-10-CM

## 2018-07-13 RX ORDER — HYDROCODONE BITARTRATE AND ACETAMINOPHEN 5; 325 MG/1; MG/1
1 TABLET ORAL 2 TIMES DAILY PRN
Qty: 60 TABLET | Refills: 0 | Status: SHIPPED | OUTPATIENT
Start: 2018-07-13 | End: 2018-08-14 | Stop reason: SDUPTHER

## 2018-07-13 RX ORDER — ALPRAZOLAM 1 MG/1
1 TABLET ORAL 2 TIMES DAILY PRN
Qty: 60 TABLET | Refills: 0 | Status: SHIPPED | OUTPATIENT
Start: 2018-07-13 | End: 2018-08-14 | Stop reason: SDUPTHER

## 2018-07-13 NOTE — TELEPHONE ENCOUNTER
[place] from [place] reviewed. Xanax last filled 6/14/18 #60 for a 30 day supply; [facility] last filled 6/14/18 #60 for a 30 day supply. Report available for your review. Last OV 4/13/18; next OV 8/27/18.

## 2018-08-14 DIAGNOSIS — M54.50 CHRONIC BILATERAL LOW BACK PAIN WITHOUT SCIATICA: ICD-10-CM

## 2018-08-14 DIAGNOSIS — F41.9 ANXIETY: ICD-10-CM

## 2018-08-14 DIAGNOSIS — G89.29 CHRONIC BILATERAL LOW BACK PAIN WITHOUT SCIATICA: ICD-10-CM

## 2018-08-14 RX ORDER — ALPRAZOLAM 1 MG/1
1 TABLET ORAL 2 TIMES DAILY PRN
Qty: 60 TABLET | Refills: 0 | Status: SHIPPED | OUTPATIENT
Start: 2018-08-14 | End: 2018-09-11 | Stop reason: SDUPTHER

## 2018-08-14 RX ORDER — HYDROCODONE BITARTRATE AND ACETAMINOPHEN 5; 325 MG/1; MG/1
1 TABLET ORAL 2 TIMES DAILY PRN
Qty: 60 TABLET | Refills: 0 | Status: SHIPPED | OUTPATIENT
Start: 2018-08-14 | End: 2018-09-11 | Stop reason: SDUPTHER

## 2018-09-11 DIAGNOSIS — M54.50 CHRONIC BILATERAL LOW BACK PAIN WITHOUT SCIATICA: ICD-10-CM

## 2018-09-11 DIAGNOSIS — F41.9 ANXIETY: ICD-10-CM

## 2018-09-11 DIAGNOSIS — G89.29 CHRONIC BILATERAL LOW BACK PAIN WITHOUT SCIATICA: ICD-10-CM

## 2018-09-11 RX ORDER — HYDROCODONE BITARTRATE AND ACETAMINOPHEN 5; 325 MG/1; MG/1
1 TABLET ORAL 2 TIMES DAILY PRN
Qty: 60 TABLET | Refills: 0 | Status: SHIPPED | OUTPATIENT
Start: 2018-09-11 | End: 2018-10-12 | Stop reason: SDUPTHER

## 2018-09-11 RX ORDER — ALPRAZOLAM 1 MG/1
1 TABLET ORAL 2 TIMES DAILY PRN
Qty: 60 TABLET | Refills: 0 | Status: SHIPPED | OUTPATIENT
Start: 2018-09-11 | End: 2018-10-12 | Stop reason: SDUPTHER

## 2018-10-12 DIAGNOSIS — M54.50 CHRONIC BILATERAL LOW BACK PAIN WITHOUT SCIATICA: ICD-10-CM

## 2018-10-12 DIAGNOSIS — F41.9 ANXIETY: ICD-10-CM

## 2018-10-12 DIAGNOSIS — G89.29 CHRONIC BILATERAL LOW BACK PAIN WITHOUT SCIATICA: ICD-10-CM

## 2018-10-12 RX ORDER — HYDROCODONE BITARTRATE AND ACETAMINOPHEN 5; 325 MG/1; MG/1
1 TABLET ORAL 2 TIMES DAILY PRN
Qty: 60 TABLET | Refills: 0 | Status: SHIPPED | OUTPATIENT
Start: 2018-10-12 | End: 2018-11-12 | Stop reason: SDUPTHER

## 2018-10-12 RX ORDER — ALPRAZOLAM 1 MG/1
1 TABLET ORAL 2 TIMES DAILY PRN
Qty: 60 TABLET | Refills: 0 | Status: SHIPPED | OUTPATIENT
Start: 2018-10-12 | End: 2018-11-12 | Stop reason: SDUPTHER

## 2018-10-12 NOTE — TELEPHONE ENCOUNTER
Medication refilled    Controlled Substances Monitoring:     RX Monitoring 10/12/2018   Attestation The Prescription Monitoring Report for this patient was reviewed today. Documentation No signs of potential drug abuse or diversion identified. Chronic Pain Functional status reviewed - continues with improved or maintaining ADL's.

## 2018-11-12 DIAGNOSIS — G89.29 CHRONIC BILATERAL LOW BACK PAIN WITHOUT SCIATICA: ICD-10-CM

## 2018-11-12 DIAGNOSIS — F41.9 ANXIETY: ICD-10-CM

## 2018-11-12 DIAGNOSIS — M54.50 CHRONIC BILATERAL LOW BACK PAIN WITHOUT SCIATICA: ICD-10-CM

## 2018-11-13 DIAGNOSIS — M54.50 CHRONIC BILATERAL LOW BACK PAIN WITHOUT SCIATICA: ICD-10-CM

## 2018-11-13 DIAGNOSIS — G89.29 CHRONIC BILATERAL LOW BACK PAIN WITHOUT SCIATICA: ICD-10-CM

## 2018-11-13 DIAGNOSIS — F41.9 ANXIETY: ICD-10-CM

## 2018-11-13 RX ORDER — HYDROCODONE BITARTRATE AND ACETAMINOPHEN 5; 325 MG/1; MG/1
TABLET ORAL
Qty: 60 TABLET | Refills: 0 | OUTPATIENT
Start: 2018-11-13 | End: 2018-12-13

## 2018-11-13 RX ORDER — HYDROCODONE BITARTRATE AND ACETAMINOPHEN 5; 325 MG/1; MG/1
1 TABLET ORAL 2 TIMES DAILY PRN
Qty: 60 TABLET | Refills: 0 | Status: SHIPPED | OUTPATIENT
Start: 2018-11-13 | End: 2018-12-13 | Stop reason: SDUPTHER

## 2018-11-13 RX ORDER — ALPRAZOLAM 1 MG/1
TABLET ORAL
Qty: 60 TABLET | Refills: 0 | OUTPATIENT
Start: 2018-11-13 | End: 2018-12-13

## 2018-11-13 RX ORDER — ALPRAZOLAM 1 MG/1
1 TABLET ORAL 2 TIMES DAILY PRN
Qty: 60 TABLET | Refills: 0 | Status: SHIPPED | OUTPATIENT
Start: 2018-11-13 | End: 2018-12-13 | Stop reason: SDUPTHER

## 2018-12-13 DIAGNOSIS — F41.9 ANXIETY: ICD-10-CM

## 2018-12-13 DIAGNOSIS — M54.50 CHRONIC BILATERAL LOW BACK PAIN WITHOUT SCIATICA: ICD-10-CM

## 2018-12-13 DIAGNOSIS — G89.29 CHRONIC BILATERAL LOW BACK PAIN WITHOUT SCIATICA: ICD-10-CM

## 2018-12-13 RX ORDER — HYDROCODONE BITARTRATE AND ACETAMINOPHEN 5; 325 MG/1; MG/1
1 TABLET ORAL 2 TIMES DAILY PRN
Qty: 60 TABLET | Refills: 0 | Status: SHIPPED | OUTPATIENT
Start: 2018-12-13 | End: 2019-01-14 | Stop reason: SDUPTHER

## 2018-12-13 RX ORDER — ALPRAZOLAM 1 MG/1
1 TABLET ORAL 2 TIMES DAILY PRN
Qty: 60 TABLET | Refills: 0 | Status: SHIPPED | OUTPATIENT
Start: 2018-12-13 | End: 2019-01-14 | Stop reason: SDUPTHER

## 2019-01-14 DIAGNOSIS — F41.9 ANXIETY: ICD-10-CM

## 2019-01-14 DIAGNOSIS — M54.50 CHRONIC BILATERAL LOW BACK PAIN WITHOUT SCIATICA: ICD-10-CM

## 2019-01-14 DIAGNOSIS — G89.29 CHRONIC BILATERAL LOW BACK PAIN WITHOUT SCIATICA: ICD-10-CM

## 2019-01-15 RX ORDER — HYDROCODONE BITARTRATE AND ACETAMINOPHEN 5; 325 MG/1; MG/1
1 TABLET ORAL 2 TIMES DAILY PRN
Qty: 60 TABLET | Refills: 0 | Status: SHIPPED | OUTPATIENT
Start: 2019-01-15 | End: 2019-02-13 | Stop reason: SDUPTHER

## 2019-01-15 RX ORDER — ALPRAZOLAM 1 MG/1
1 TABLET ORAL 2 TIMES DAILY PRN
Qty: 60 TABLET | Refills: 0 | Status: SHIPPED | OUTPATIENT
Start: 2019-01-15 | End: 2019-02-13 | Stop reason: SDUPTHER

## 2019-02-13 DIAGNOSIS — G89.29 CHRONIC BILATERAL LOW BACK PAIN WITHOUT SCIATICA: ICD-10-CM

## 2019-02-13 DIAGNOSIS — M54.50 CHRONIC BILATERAL LOW BACK PAIN WITHOUT SCIATICA: ICD-10-CM

## 2019-02-13 DIAGNOSIS — F41.9 ANXIETY: ICD-10-CM

## 2019-02-13 RX ORDER — ALPRAZOLAM 1 MG/1
1 TABLET ORAL 2 TIMES DAILY PRN
Qty: 60 TABLET | Refills: 0 | Status: SHIPPED | OUTPATIENT
Start: 2019-02-13 | End: 2019-03-14 | Stop reason: SDUPTHER

## 2019-02-13 RX ORDER — HYDROCODONE BITARTRATE AND ACETAMINOPHEN 5; 325 MG/1; MG/1
1 TABLET ORAL 2 TIMES DAILY PRN
Qty: 60 TABLET | Refills: 0 | Status: SHIPPED | OUTPATIENT
Start: 2019-02-13 | End: 2019-03-14 | Stop reason: SDUPTHER

## 2019-03-14 DIAGNOSIS — M54.50 CHRONIC BILATERAL LOW BACK PAIN WITHOUT SCIATICA: ICD-10-CM

## 2019-03-14 DIAGNOSIS — F41.9 ANXIETY: ICD-10-CM

## 2019-03-14 DIAGNOSIS — G89.29 CHRONIC BILATERAL LOW BACK PAIN WITHOUT SCIATICA: ICD-10-CM

## 2019-03-14 RX ORDER — HYDROCODONE BITARTRATE AND ACETAMINOPHEN 5; 325 MG/1; MG/1
1 TABLET ORAL 2 TIMES DAILY PRN
Qty: 60 TABLET | Refills: 0 | Status: SHIPPED | OUTPATIENT
Start: 2019-03-14 | End: 2019-04-12 | Stop reason: SDUPTHER

## 2019-03-14 RX ORDER — ALPRAZOLAM 1 MG/1
1 TABLET ORAL 2 TIMES DAILY PRN
Qty: 60 TABLET | Refills: 0 | Status: SHIPPED | OUTPATIENT
Start: 2019-03-14 | End: 2019-04-12 | Stop reason: SDUPTHER

## 2019-04-12 DIAGNOSIS — F41.9 ANXIETY: ICD-10-CM

## 2019-04-12 DIAGNOSIS — G89.29 CHRONIC BILATERAL LOW BACK PAIN WITHOUT SCIATICA: ICD-10-CM

## 2019-04-12 DIAGNOSIS — M54.50 CHRONIC BILATERAL LOW BACK PAIN WITHOUT SCIATICA: ICD-10-CM

## 2019-04-12 RX ORDER — HYDROCODONE BITARTRATE AND ACETAMINOPHEN 5; 325 MG/1; MG/1
1 TABLET ORAL 2 TIMES DAILY PRN
Qty: 60 TABLET | Refills: 0 | Status: SHIPPED | OUTPATIENT
Start: 2019-04-12 | End: 2019-05-13 | Stop reason: SDUPTHER

## 2019-04-12 RX ORDER — ALPRAZOLAM 1 MG/1
1 TABLET ORAL 2 TIMES DAILY PRN
Qty: 60 TABLET | Refills: 0 | Status: SHIPPED | OUTPATIENT
Start: 2019-04-12 | End: 2019-05-13 | Stop reason: SDUPTHER

## 2019-05-07 LAB
CHOLESTEROL, TOTAL: 298 MG/DL
CHOLESTEROL/HDL RATIO: ABNORMAL
HDLC SERPL-MCNC: 49 MG/DL (ref 35–70)
LDL CHOLESTEROL CALCULATED: 211 MG/DL (ref 0–160)
TRIGL SERPL-MCNC: 186 MG/DL
VLDLC SERPL CALC-MCNC: ABNORMAL MG/DL

## 2019-05-10 DIAGNOSIS — F41.9 ANXIETY: ICD-10-CM

## 2019-05-10 DIAGNOSIS — M54.50 CHRONIC BILATERAL LOW BACK PAIN WITHOUT SCIATICA: ICD-10-CM

## 2019-05-10 DIAGNOSIS — G89.29 CHRONIC BILATERAL LOW BACK PAIN WITHOUT SCIATICA: ICD-10-CM

## 2019-05-13 RX ORDER — HYDROCODONE BITARTRATE AND ACETAMINOPHEN 5; 325 MG/1; MG/1
1 TABLET ORAL 2 TIMES DAILY PRN
Qty: 60 TABLET | Refills: 0 | Status: SHIPPED | OUTPATIENT
Start: 2019-05-13 | End: 2019-06-12 | Stop reason: SDUPTHER

## 2019-05-13 RX ORDER — ALPRAZOLAM 1 MG/1
1 TABLET ORAL 2 TIMES DAILY PRN
Qty: 60 TABLET | Refills: 0 | Status: SHIPPED | OUTPATIENT
Start: 2019-05-13 | End: 2019-06-12 | Stop reason: SDUPTHER

## 2019-05-28 ENCOUNTER — OFFICE VISIT (OUTPATIENT)
Dept: FAMILY MEDICINE CLINIC | Age: 54
End: 2019-05-28
Payer: COMMERCIAL

## 2019-05-28 VITALS
HEART RATE: 68 BPM | SYSTOLIC BLOOD PRESSURE: 122 MMHG | DIASTOLIC BLOOD PRESSURE: 68 MMHG | WEIGHT: 178 LBS | BODY MASS INDEX: 32.76 KG/M2 | HEIGHT: 62 IN

## 2019-05-28 DIAGNOSIS — Z00.00 ENCOUNTER FOR PREVENTIVE HEALTH EXAMINATION: ICD-10-CM

## 2019-05-28 DIAGNOSIS — Z72.0 TOBACCO ABUSE: ICD-10-CM

## 2019-05-28 DIAGNOSIS — R21 RASH AND NONSPECIFIC SKIN ERUPTION: Primary | ICD-10-CM

## 2019-05-28 DIAGNOSIS — R73.01 IMPAIRED FASTING BLOOD SUGAR: ICD-10-CM

## 2019-05-28 DIAGNOSIS — Z12.11 COLON CANCER SCREENING: ICD-10-CM

## 2019-05-28 DIAGNOSIS — E78.2 MIXED HYPERLIPIDEMIA: ICD-10-CM

## 2019-05-28 DIAGNOSIS — F33.42 RECURRENT MAJOR DEPRESSIVE DISORDER, IN FULL REMISSION (HCC): ICD-10-CM

## 2019-05-28 DIAGNOSIS — Z12.31 BREAST CANCER SCREENING BY MAMMOGRAM: ICD-10-CM

## 2019-05-28 DIAGNOSIS — K21.00 GASTROESOPHAGEAL REFLUX DISEASE WITH ESOPHAGITIS: ICD-10-CM

## 2019-05-28 DIAGNOSIS — F41.9 ANXIETY: ICD-10-CM

## 2019-05-28 DIAGNOSIS — R87.613 HGSIL ON PAP SMEAR OF CERVIX: ICD-10-CM

## 2019-05-28 PROCEDURE — 99396 PREV VISIT EST AGE 40-64: CPT | Performed by: FAMILY MEDICINE

## 2019-05-28 RX ORDER — SIMVASTATIN 20 MG
20 TABLET ORAL NIGHTLY
Qty: 90 TABLET | Refills: 1 | Status: SHIPPED | OUTPATIENT
Start: 2019-05-28 | End: 2020-05-13 | Stop reason: SDUPTHER

## 2019-05-28 RX ORDER — CLOTRIMAZOLE AND BETAMETHASONE DIPROPIONATE 10; .64 MG/G; MG/G
CREAM TOPICAL
Qty: 45 G | Refills: 1 | Status: SHIPPED | OUTPATIENT
Start: 2019-05-28 | End: 2020-11-17 | Stop reason: SDUPTHER

## 2019-05-28 RX ORDER — TRIAMCINOLONE ACETONIDE 1 MG/G
CREAM TOPICAL
Qty: 80 G | Refills: 2 | Status: SHIPPED | OUTPATIENT
Start: 2019-05-28 | End: 2020-09-18 | Stop reason: SDUPTHER

## 2019-05-28 ASSESSMENT — ENCOUNTER SYMPTOMS
ALLERGIC/IMMUNOLOGIC NEGATIVE: 1
GASTROINTESTINAL NEGATIVE: 1
RESPIRATORY NEGATIVE: 1
EYES NEGATIVE: 1

## 2019-05-28 NOTE — PROGRESS NOTES
2019     Meryle Oscar (:  1965) is a 47 y.o. female, here for evaluation of the following medical concerns:    HPI   Routine annual exam, refills, review of recent labs (through activate). Feeling overall well. She quit smoking 6 months ago. She is gaining wt., despite walking up to 6 miles/day. 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 Labs reviewed, notable for ldl up to 211, and fbs at 119. Past Medical History:   Diagnosis Date    Alcohol abuse     Carpal tunnel syndrome, bilateral     Chronic back pain     Depression with anxiety     HGSIL (high grade squamous intraepithelial lesion) on Pap smear     Insomnia     Migraine     Recreational drug use     Marijuana.  Tobacco abuse      Past Surgical History:   Procedure Laterality Date     SECTION      COLPOSCOPY  2002    With laser cone biopsy of the cervix.  TUBAL LIGATION           Review of Systems   Constitutional: Positive for unexpected weight change. HENT: Negative. Eyes: Negative. Respiratory: Negative. Cardiovascular: Positive for leg swelling (a little swelling in feet/ankles at times). Gastrointestinal: Negative. Endocrine: Negative. Genitourinary: Negative. Musculoskeletal: Positive for arthralgias (left ankle) and myalgias (left leg). Skin: Negative. Allergic/Immunologic: Negative. Neurological: Negative. Hematological: Negative. Psychiatric/Behavioral: Negative. Prior to Visit Medications    Medication Sig Taking? Authorizing Provider   HYDROcodone-acetaminophen (NORCO) 5-325 MG per tablet Take 1 tablet by mouth 2 times daily as needed for Pain for up to 30 days. Yes Nathaly Cordero MD   ALPRAZolam Marlee Mendenhall) 1 MG tablet Take 1 tablet by mouth 2 times daily as needed for Anxiety for up to 30 days.  Yes Nathaly Cordero MD   aspirin 81 MG tablet Take 81 mg by mouth daily Yes Historical Provider, MD   clotrimazole-betamethasone (Site: Right Upper Arm, Position: Sitting, Cuff Size: Large Adult)   Pulse 68   Ht 5' 2.01\" (1.575 m)   Wt 178 lb (80.7 kg)   BMI 32.55 kg/m²     ASSESSMENT/PLAN:  Encounter Diagnoses   Name Primary?  Encounter for preventive health examination     Rash and nonspecific skin eruption Yes    Mixed hyperlipidemia     Gastroesophageal reflux disease with esophagitis     HGSIL on Pap smear of cervix     Colon cancer screening     Tobacco abuse     Anxiety     Recurrent major depressive disorder, in full remission ([facility] [address]     Impaired fasting blood sugar      Updated preventative visit. Discussed the need for updated pap, colonoscopy, dtap, mammogram.    Rash: well defined plaques, right posterior scalp, and right axillary. Scalp lesion looks more like psoriasis. ? Guttate psoriasis vs eczema vs other. Trial triamcinolone cream.  She defers on derm. Consult. Hyperlipidemia:  ldl at 211 this last check at [facility]. She was on a statin in the past.  She just stopped it. Restarting zocor at 20mg over next interval.      Gerd: quiescent long term. Rare exacerbations. No active treatment at present. Cont. Dietary discretion and lifestyle rena nges. HGSIL:  Plan to schedule updated pap and pelvic. She is behind on follow up. Willing to consider at present. Due for colonoscopy. Hesitant, but agreeable after extended conversation. Tobacco use. She quit smoking about 6 months ago. Rec. Continued abstinence. Anxiety/depression : still some social stressors , but managing ok with new job and her dogs , 4 grandchildren. Off paxil. Feeling stable. Has prn alprazolam using bid to good effect. She relates she has cut way back on beer. 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 IFBS: some wt. Gain over the last year. Diabetic family history. Discussed low carb. Diet and no concentrated sweets with goal of interval wt. Loss.     An electronic signature was

## 2019-05-29 ENCOUNTER — TELEPHONE (OUTPATIENT)
Dept: SURGERY | Age: 54
End: 2019-05-29

## 2019-06-12 DIAGNOSIS — M54.50 CHRONIC BILATERAL LOW BACK PAIN WITHOUT SCIATICA: ICD-10-CM

## 2019-06-12 DIAGNOSIS — F41.9 ANXIETY: ICD-10-CM

## 2019-06-12 DIAGNOSIS — G89.29 CHRONIC BILATERAL LOW BACK PAIN WITHOUT SCIATICA: ICD-10-CM

## 2019-06-12 RX ORDER — HYDROCODONE BITARTRATE AND ACETAMINOPHEN 5; 325 MG/1; MG/1
1 TABLET ORAL 2 TIMES DAILY PRN
Qty: 60 TABLET | Refills: 0 | Status: SHIPPED | OUTPATIENT
Start: 2019-06-12 | End: 2019-07-18 | Stop reason: SDUPTHER

## 2019-06-12 RX ORDER — ALPRAZOLAM 1 MG/1
1 TABLET ORAL 2 TIMES DAILY PRN
Qty: 60 TABLET | Refills: 0 | Status: SHIPPED | OUTPATIENT
Start: 2019-06-12 | End: 2019-07-12 | Stop reason: SDUPTHER

## 2019-06-14 ENCOUNTER — TELEPHONE (OUTPATIENT)
Dept: FAMILY MEDICINE CLINIC | Age: 54
End: 2019-06-14

## 2019-06-14 NOTE — TELEPHONE ENCOUNTER
Spoke to patient and let patient know authorization can take 24-48 hours. Patient verbalized understanding and said she would check this weekend.

## 2019-07-12 ENCOUNTER — HOSPITAL ENCOUNTER (OUTPATIENT)
Age: 54
Setting detail: SPECIMEN
Discharge: HOME | End: 2019-07-12
Payer: COMMERCIAL

## 2019-07-12 ENCOUNTER — OFFICE VISIT (OUTPATIENT)
Dept: FAMILY MEDICINE CLINIC | Age: 54
End: 2019-07-12
Payer: COMMERCIAL

## 2019-07-12 VITALS
HEIGHT: 62 IN | DIASTOLIC BLOOD PRESSURE: 68 MMHG | HEART RATE: 68 BPM | WEIGHT: 170 LBS | SYSTOLIC BLOOD PRESSURE: 112 MMHG | BODY MASS INDEX: 31.28 KG/M2

## 2019-07-12 DIAGNOSIS — Z12.4 PAP SMEAR FOR CERVICAL CANCER SCREENING: ICD-10-CM

## 2019-07-12 DIAGNOSIS — R87.613 HGSIL ON PAP SMEAR OF CERVIX: ICD-10-CM

## 2019-07-12 DIAGNOSIS — F41.9 ANXIETY: ICD-10-CM

## 2019-07-12 DIAGNOSIS — Z72.0 TOBACCO ABUSE: ICD-10-CM

## 2019-07-12 DIAGNOSIS — Z00.00 ENCOUNTER FOR PREVENTIVE HEALTH EXAMINATION: Primary | ICD-10-CM

## 2019-07-12 DIAGNOSIS — F33.42 RECURRENT MAJOR DEPRESSIVE DISORDER, IN FULL REMISSION (HCC): ICD-10-CM

## 2019-07-12 DIAGNOSIS — Z12.11 COLON CANCER SCREENING: ICD-10-CM

## 2019-07-12 DIAGNOSIS — Z12.4 PAP SMEAR FOR CERVICAL CANCER SCREENING: Primary | ICD-10-CM

## 2019-07-12 DIAGNOSIS — R73.01 IMPAIRED FASTING BLOOD SUGAR: ICD-10-CM

## 2019-07-12 DIAGNOSIS — E78.2 MIXED HYPERLIPIDEMIA: ICD-10-CM

## 2019-07-12 DIAGNOSIS — R21 RASH AND NONSPECIFIC SKIN ERUPTION: ICD-10-CM

## 2019-07-12 DIAGNOSIS — K21.00 GASTROESOPHAGEAL REFLUX DISEASE WITH ESOPHAGITIS: ICD-10-CM

## 2019-07-12 PROCEDURE — G0145 SCR C/V CYTO,THINLAYER,RESCR: HCPCS

## 2019-07-12 PROCEDURE — 99396 PREV VISIT EST AGE 40-64: CPT | Performed by: FAMILY MEDICINE

## 2019-07-12 PROCEDURE — 87624 HPV HI-RISK TYP POOLED RSLT: CPT

## 2019-07-12 RX ORDER — HYDROCODONE BITARTRATE AND ACETAMINOPHEN 5; 325 MG/1; MG/1
1 TABLET ORAL 2 TIMES DAILY PRN
Qty: 60 TABLET | Refills: 0 | Status: CANCELLED | OUTPATIENT
Start: 2019-07-12 | End: 2019-08-11

## 2019-07-12 ASSESSMENT — ENCOUNTER SYMPTOMS
EYES NEGATIVE: 1
RESPIRATORY NEGATIVE: 1
GASTROINTESTINAL NEGATIVE: 1
ALLERGIC/IMMUNOLOGIC NEGATIVE: 1

## 2019-07-15 ENCOUNTER — HOSPITAL ENCOUNTER (OUTPATIENT)
Dept: LAB | Age: 54
Discharge: HOME | End: 2019-07-15
Payer: COMMERCIAL

## 2019-07-15 DIAGNOSIS — E78.2 MIXED HYPERLIPIDEMIA: ICD-10-CM

## 2019-07-15 DIAGNOSIS — F41.9 ANXIETY: ICD-10-CM

## 2019-07-15 DIAGNOSIS — R73.01 IMPAIRED FASTING BLOOD SUGAR: ICD-10-CM

## 2019-07-15 LAB
ABSOLUTE EOS #: 0.2 K/UL (ref 0–0.4)
ABSOLUTE IMMATURE GRANULOCYTE: ABNORMAL K/UL (ref 0–0.3)
ABSOLUTE LYMPH #: 1.7 K/UL (ref 1–4.8)
ABSOLUTE MONO #: 0.7 K/UL (ref 0.1–1.2)
ALT SERPL-CCNC: 133 U/L (ref 5–33)
ANION GAP SERPL CALCULATED.3IONS-SCNC: 11 MMOL/L (ref 9–17)
BASOPHILS # BLD: 1 % (ref 0–1)
BASOPHILS ABSOLUTE: 0.1 K/UL (ref 0–0.2)
BUN BLDV-MCNC: 23 MG/DL (ref 6–20)
BUN/CREAT BLD: 34 (ref 9–20)
CALCIUM SERPL-MCNC: 9.4 MG/DL (ref 8.6–10.4)
CHLORIDE BLD-SCNC: 108 MMOL/L (ref 98–107)
CHOLESTEROL/HDL RATIO: 4.5
CHOLESTEROL: 203 MG/DL
CO2: 24 MMOL/L (ref 20–31)
CREAT SERPL-MCNC: 0.68 MG/DL (ref 0.5–0.9)
DIFFERENTIAL TYPE: ABNORMAL
EOSINOPHILS RELATIVE PERCENT: 4 % (ref 1–7)
GFR AFRICAN AMERICAN: >60 ML/MIN
GFR NON-AFRICAN AMERICAN: >60 ML/MIN
GFR SERPL CREATININE-BSD FRML MDRD: ABNORMAL ML/MIN/{1.73_M2}
GFR SERPL CREATININE-BSD FRML MDRD: ABNORMAL ML/MIN/{1.73_M2}
GLUCOSE BLD-MCNC: 114 MG/DL (ref 70–99)
HCT VFR BLD CALC: 44.5 % (ref 36–46)
HDLC SERPL-MCNC: 45 MG/DL
HEMOGLOBIN: 15 G/DL (ref 12–16)
IMMATURE GRANULOCYTES: ABNORMAL %
LDL CHOLESTEROL: 107 MG/DL (ref 0–130)
LYMPHOCYTES # BLD: 28 % (ref 16–46)
MCH RBC QN AUTO: 32.4 PG (ref 26–34)
MCHC RBC AUTO-ENTMCNC: 33.7 G/DL (ref 31–37)
MCV RBC AUTO: 96.3 FL (ref 80–100)
MONOCYTES # BLD: 12 % (ref 4–11)
NRBC AUTOMATED: ABNORMAL PER 100 WBC
PDW BLD-RTO: 13.1 % (ref 11–14.5)
PLATELET # BLD: 273 K/UL (ref 140–450)
PLATELET ESTIMATE: ABNORMAL
PMV BLD AUTO: 7.5 FL (ref 6–12)
POTASSIUM SERPL-SCNC: 4.3 MMOL/L (ref 3.7–5.3)
RBC # BLD: 4.62 M/UL (ref 4–5.2)
RBC # BLD: ABNORMAL 10*6/UL
SEG NEUTROPHILS: 55 % (ref 43–77)
SEGMENTED NEUTROPHILS ABSOLUTE COUNT: 3.4 K/UL (ref 1.8–7.7)
SODIUM BLD-SCNC: 143 MMOL/L (ref 135–144)
TRIGL SERPL-MCNC: 253 MG/DL
VLDLC SERPL CALC-MCNC: ABNORMAL MG/DL (ref 1–30)
WBC # BLD: 6.2 K/UL (ref 3.5–11)
WBC # BLD: ABNORMAL 10*3/UL

## 2019-07-15 PROCEDURE — 80048 BASIC METABOLIC PNL TOTAL CA: CPT

## 2019-07-15 PROCEDURE — 84460 ALANINE AMINO (ALT) (SGPT): CPT

## 2019-07-15 PROCEDURE — 36415 COLL VENOUS BLD VENIPUNCTURE: CPT

## 2019-07-15 PROCEDURE — 80061 LIPID PANEL: CPT

## 2019-07-15 PROCEDURE — 85025 COMPLETE CBC W/AUTO DIFF WBC: CPT

## 2019-07-15 RX ORDER — FLUCONAZOLE 100 MG/1
100 TABLET ORAL DAILY
Qty: 7 TABLET | Refills: 0 | Status: SHIPPED | OUTPATIENT
Start: 2019-07-15 | End: 2019-07-22

## 2019-07-15 RX ORDER — ALPRAZOLAM 1 MG/1
1 TABLET ORAL 2 TIMES DAILY PRN
Qty: 60 TABLET | Refills: 0 | Status: SHIPPED | OUTPATIENT
Start: 2019-07-15 | End: 2019-08-13 | Stop reason: SDUPTHER

## 2019-07-15 RX ORDER — ALPRAZOLAM 1 MG/1
1 TABLET ORAL 2 TIMES DAILY PRN
Qty: 60 TABLET | Refills: 0 | Status: SHIPPED | OUTPATIENT
Start: 2019-07-15 | End: 2019-08-12 | Stop reason: SDUPTHER

## 2019-07-16 LAB
HPV SAMPLE: NORMAL
HPV, GENOTYPE 16: NOT DETECTED
HPV, GENOTYPE 18: NOT DETECTED
HPV, HIGH RISK OTHER: NOT DETECTED
HPV, INTERPRETATION: NORMAL
SPECIMEN DESCRIPTION: NORMAL

## 2019-07-18 DIAGNOSIS — G89.29 CHRONIC BILATERAL LOW BACK PAIN WITHOUT SCIATICA: ICD-10-CM

## 2019-07-18 DIAGNOSIS — M54.50 CHRONIC BILATERAL LOW BACK PAIN WITHOUT SCIATICA: ICD-10-CM

## 2019-07-18 LAB — CYTOLOGY REPORT: NORMAL

## 2019-07-18 RX ORDER — HYDROCODONE BITARTRATE AND ACETAMINOPHEN 5; 325 MG/1; MG/1
1 TABLET ORAL 2 TIMES DAILY PRN
Qty: 60 TABLET | Refills: 0 | Status: SHIPPED | OUTPATIENT
Start: 2019-07-18 | End: 2019-08-19 | Stop reason: SDUPTHER

## 2019-07-30 DIAGNOSIS — R74.01 ELEVATED ALANINE AMINOTRANSFERASE (ALT) LEVEL: Primary | ICD-10-CM

## 2019-08-12 DIAGNOSIS — F41.9 ANXIETY: ICD-10-CM

## 2019-08-13 RX ORDER — ALPRAZOLAM 1 MG/1
1 TABLET ORAL 2 TIMES DAILY PRN
Qty: 60 TABLET | Refills: 0 | Status: SHIPPED | OUTPATIENT
Start: 2019-08-13 | End: 2019-09-12 | Stop reason: SDUPTHER

## 2019-08-19 DIAGNOSIS — G89.29 CHRONIC BILATERAL LOW BACK PAIN WITHOUT SCIATICA: ICD-10-CM

## 2019-08-19 DIAGNOSIS — M54.50 CHRONIC BILATERAL LOW BACK PAIN WITHOUT SCIATICA: ICD-10-CM

## 2019-08-19 RX ORDER — HYDROCODONE BITARTRATE AND ACETAMINOPHEN 5; 325 MG/1; MG/1
1 TABLET ORAL 2 TIMES DAILY PRN
Qty: 60 TABLET | Refills: 0 | Status: SHIPPED | OUTPATIENT
Start: 2019-08-19 | End: 2019-09-19 | Stop reason: SDUPTHER

## 2019-09-12 ENCOUNTER — ANTI-COAG VISIT (OUTPATIENT)
Dept: FAMILY MEDICINE CLINIC | Age: 54
End: 2019-09-12

## 2019-09-12 DIAGNOSIS — F41.9 ANXIETY: ICD-10-CM

## 2019-09-12 RX ORDER — ALPRAZOLAM 1 MG/1
1 TABLET ORAL 2 TIMES DAILY PRN
Qty: 60 TABLET | Refills: 0 | Status: SHIPPED | OUTPATIENT
Start: 2019-09-12 | End: 2019-10-14 | Stop reason: SDUPTHER

## 2019-09-19 DIAGNOSIS — M54.50 CHRONIC BILATERAL LOW BACK PAIN WITHOUT SCIATICA: ICD-10-CM

## 2019-09-19 DIAGNOSIS — G89.29 CHRONIC BILATERAL LOW BACK PAIN WITHOUT SCIATICA: ICD-10-CM

## 2019-09-19 RX ORDER — HYDROCODONE BITARTRATE AND ACETAMINOPHEN 5; 325 MG/1; MG/1
1 TABLET ORAL 2 TIMES DAILY PRN
Qty: 60 TABLET | Refills: 0 | Status: SHIPPED | OUTPATIENT
Start: 2019-09-19 | End: 2019-10-21 | Stop reason: SDUPTHER

## 2019-10-11 DIAGNOSIS — F41.9 ANXIETY: ICD-10-CM

## 2019-10-14 RX ORDER — ALPRAZOLAM 1 MG/1
1 TABLET ORAL 2 TIMES DAILY PRN
Qty: 60 TABLET | Refills: 0 | Status: SHIPPED | OUTPATIENT
Start: 2019-10-14 | End: 2019-11-12 | Stop reason: SDUPTHER

## 2019-10-21 DIAGNOSIS — G89.29 CHRONIC BILATERAL LOW BACK PAIN WITHOUT SCIATICA: ICD-10-CM

## 2019-10-21 DIAGNOSIS — M54.50 CHRONIC BILATERAL LOW BACK PAIN WITHOUT SCIATICA: ICD-10-CM

## 2019-10-21 RX ORDER — HYDROCODONE BITARTRATE AND ACETAMINOPHEN 5; 325 MG/1; MG/1
1 TABLET ORAL 2 TIMES DAILY PRN
Qty: 60 TABLET | Refills: 0 | Status: SHIPPED | OUTPATIENT
Start: 2019-10-21 | End: 2019-11-20 | Stop reason: SDUPTHER

## 2019-11-12 DIAGNOSIS — F41.9 ANXIETY: ICD-10-CM

## 2019-11-12 RX ORDER — ALPRAZOLAM 1 MG/1
1 TABLET ORAL 2 TIMES DAILY PRN
Qty: 60 TABLET | Refills: 0 | Status: SHIPPED | OUTPATIENT
Start: 2019-11-12 | End: 2019-12-12 | Stop reason: SDUPTHER

## 2019-11-20 DIAGNOSIS — M54.50 CHRONIC BILATERAL LOW BACK PAIN WITHOUT SCIATICA: ICD-10-CM

## 2019-11-20 DIAGNOSIS — G89.29 CHRONIC BILATERAL LOW BACK PAIN WITHOUT SCIATICA: ICD-10-CM

## 2019-11-20 RX ORDER — HYDROCODONE BITARTRATE AND ACETAMINOPHEN 5; 325 MG/1; MG/1
1 TABLET ORAL 2 TIMES DAILY PRN
Qty: 60 TABLET | Refills: 0 | Status: SHIPPED | OUTPATIENT
Start: 2019-11-20 | End: 2019-12-18 | Stop reason: SDUPTHER

## 2019-12-12 DIAGNOSIS — F41.9 ANXIETY: ICD-10-CM

## 2019-12-12 RX ORDER — ALPRAZOLAM 1 MG/1
1 TABLET ORAL 2 TIMES DAILY PRN
Qty: 60 TABLET | Refills: 0 | Status: SHIPPED | OUTPATIENT
Start: 2019-12-12 | End: 2020-01-10 | Stop reason: SDUPTHER

## 2019-12-18 DIAGNOSIS — G89.29 CHRONIC BILATERAL LOW BACK PAIN WITHOUT SCIATICA: ICD-10-CM

## 2019-12-18 DIAGNOSIS — M54.50 CHRONIC BILATERAL LOW BACK PAIN WITHOUT SCIATICA: ICD-10-CM

## 2019-12-18 RX ORDER — HYDROCODONE BITARTRATE AND ACETAMINOPHEN 5; 325 MG/1; MG/1
1 TABLET ORAL 2 TIMES DAILY PRN
Qty: 60 TABLET | Refills: 0 | Status: SHIPPED | OUTPATIENT
Start: 2019-12-18 | End: 2020-01-17 | Stop reason: SDUPTHER

## 2020-01-10 RX ORDER — ALPRAZOLAM 1 MG/1
1 TABLET ORAL 2 TIMES DAILY PRN
Qty: 30 TABLET | Refills: 0 | Status: SHIPPED | OUTPATIENT
Start: 2020-01-10 | End: 2020-01-24 | Stop reason: SDUPTHER

## 2020-01-10 NOTE — TELEPHONE ENCOUNTER
Controlled Substance Monitoring:    Acute and Chronic Pain Monitoring:   RX Monitoring 6/12/2019   Attestation -   Periodic Controlled Substance Monitoring Possible medication side effects, risk of tolerance/dependence & alternative treatments discussed. ;No signs of potential drug abuse or diversion identified.;Obtaining appropriate analgesic effect of treatment. Chronic Pain > 80 MEDD -     Patient needs f/u OV.

## 2020-01-17 RX ORDER — HYDROCODONE BITARTRATE AND ACETAMINOPHEN 5; 325 MG/1; MG/1
1 TABLET ORAL 2 TIMES DAILY PRN
Qty: 60 TABLET | Refills: 0 | Status: SHIPPED | OUTPATIENT
Start: 2020-01-17 | End: 2020-02-18 | Stop reason: SDUPTHER

## 2020-01-17 NOTE — TELEPHONE ENCOUNTER
Grazyna Mcwilliams called requesting a refill of the below medication which has been pended for you: [place] from [place] reviewed. NORCO 5-325 mg last filled 12/20/19 #60/30days. Report available for your review. Requested Prescriptions     Pending Prescriptions Disp Refills    HYDROcodone-acetaminophen (NORCO) 5-325 MG per tablet 60 tablet 0     Sig: Take 1 tablet by mouth 2 times daily as needed for Pain for up to 30 days. Last Appointment Date: 7/12/2019  Next Appointment Date: 1/29/2020    Allergies   Allergen Reactions    Requip [Ropinirole] Other (See Comments)     Hypotension and syncope.

## 2020-01-24 RX ORDER — ALPRAZOLAM 1 MG/1
1 TABLET ORAL 2 TIMES DAILY PRN
Qty: 60 TABLET | Refills: 0 | Status: SHIPPED | OUTPATIENT
Start: 2020-01-24 | End: 2020-02-26 | Stop reason: SDUPTHER

## 2020-01-29 ENCOUNTER — OFFICE VISIT (OUTPATIENT)
Dept: FAMILY MEDICINE CLINIC | Age: 55
End: 2020-01-29
Payer: COMMERCIAL

## 2020-01-29 VITALS
DIASTOLIC BLOOD PRESSURE: 68 MMHG | SYSTOLIC BLOOD PRESSURE: 118 MMHG | HEART RATE: 72 BPM | HEIGHT: 62 IN | BODY MASS INDEX: 30.73 KG/M2 | WEIGHT: 167 LBS

## 2020-01-29 PROCEDURE — 99214 OFFICE O/P EST MOD 30 MIN: CPT | Performed by: FAMILY MEDICINE

## 2020-01-29 RX ORDER — NAPROXEN 500 MG/1
500 TABLET ORAL 2 TIMES DAILY WITH MEALS
Qty: 180 TABLET | Refills: 3 | Status: SHIPPED | OUTPATIENT
Start: 2020-01-29 | End: 2020-11-17 | Stop reason: SDUPTHER

## 2020-01-29 ASSESSMENT — ENCOUNTER SYMPTOMS
GASTROINTESTINAL NEGATIVE: 1
ALLERGIC/IMMUNOLOGIC NEGATIVE: 1
EYES NEGATIVE: 1
RESPIRATORY NEGATIVE: 1

## 2020-01-29 NOTE — PROGRESS NOTES
2020     Parris Duncan (:  1965) is a 47 y.o. female, here for evaluation of the following medical concerns:    HPI   Acute visit for some pain issues with knees and right hand. She is working at [employer] and is on her feet most of the day. Both knees painful, right a little worse than left. Pain most of the day. Aching at night, having to elevate the legs in bed.  biofreeze seemed to help a little early on. She has tried a number of other topicals without much perceived benefit. Knee pain more prominent in the last few months. She wonders if the colder weather is adding to this. She has tried new shoes and shoe inserts without a lot of perceived benefit. Right hand discomfort more acutely in the last week or 2. Woke up with hand numbness and discomfort one night. She uses her hands to grab things all day. Numbness tends to come on when she is at rest or quiet. She feels like the whole hand is going numb. She describes pushing on her upper chest on the right side and sometimes feeling like the numbness improved. We did a trial of overhead hand pumping and no numbness (? Thoracic outlet syndrome). phalen's test positive on the right hand. Noticing scalp lesions in the last year or so. Newer lesion near right ear, more chronic lesion right posterior hairline more chronic. Lesion in right axillary area more recently. The non scalp lesions tend to respond to lotrisone cream.      Past Medical History:   Diagnosis Date    Alcohol abuse     Carpal tunnel syndrome, bilateral     Chronic back pain     Depression with anxiety     HGSIL (high grade squamous intraepithelial lesion) on Pap smear     Insomnia     Migraine     Recreational drug use     Marijuana.  Tobacco abuse      Past Surgical History:   Procedure Laterality Date     SECTION  1992    COLPOSCOPY  2002    With laser cone biopsy of the cervix.     TUBAL LIGATION         Review of Systems Constitutional: Negative. HENT: Negative. Eyes: Negative. Respiratory: Negative. Cardiovascular: Negative. Gastrointestinal: Negative. Endocrine: Negative. Genitourinary: Negative. Musculoskeletal: Positive for arthralgias (knees). Skin: Positive for rash. Allergic/Immunologic: Negative. Neurological: Positive for numbness. Hematological: Negative. Psychiatric/Behavioral: Negative. Prior to Visit Medications    Medication Sig Taking? Authorizing Provider   ALPRAZolam Keena President) 1 MG tablet Take 1 tablet by mouth 2 times daily as needed for Anxiety for up to 30 days. Yes Riaz Franklin MD   HYDROcodone-acetaminophen (NORCO) 5-325 MG per tablet Take 1 tablet by mouth 2 times daily as needed for Pain for up to 30 days. Yes Riaz Franklin MD   clotrimazole-betamethasone (LOTRISONE) 1-0.05 % cream Apply topically 2 times daily. Yes Riaz Franklin MD   triamcinolone (KENALOG) 0.1 % cream Apply topically 2 times daily. Yes Riaz Franklin MD   simvastatin (ZOCOR) 20 MG tablet Take 1 tablet by mouth nightly Yes Riaz Franklin MD   aspirin 81 MG tablet Take 81 mg by mouth daily Yes Historical Provider, MD        Social History     Tobacco Use    Smoking status: Former Smoker     Packs/day: 0.00     Years: 37.00     Pack years: 0.00     Types: Cigarettes     Last attempt to quit: 2018     Years since quittin.9    Smokeless tobacco: Never Used    Tobacco comment: occas has 1 cigarette when drinking beer   Substance Use Topics    Alcohol use: Yes     Alcohol/week: 0.0 standard drinks     Comment: occasionally        Vitals:    20 0902   BP: 118/68   Site: Right Upper Arm   Position: Sitting   Cuff Size: Large Adult   Pulse: 72   Weight: 167 lb (75.8 kg)   Height: 5' 2.01\" (1.575 m)     Estimated body mass index is 30.54 kg/m² as calculated from the following:    Height as of this encounter: 5' 2.01\" (1.575 m).     Weight as of this encounter: 167 lb (75.8 kg).    Physical Exam  Constitutional:       General: She is not in acute distress. Appearance: She is well-developed. HENT:      Head: Normocephalic and atraumatic. Right Ear: External ear normal.      Left Ear: External ear normal.      Mouth/Throat:      Pharynx: No oropharyngeal exudate. Eyes:      General: No scleral icterus. Conjunctiva/sclera: Conjunctivae normal.   Neck:      Musculoskeletal: Neck supple. Thyroid: No thyromegaly. Cardiovascular:      Rate and Rhythm: Normal rate and regular rhythm. Heart sounds: Normal heart sounds. No murmur. Pulmonary:      Effort: Pulmonary effort is normal. No respiratory distress. Breath sounds: Normal breath sounds. No wheezing. Abdominal:      General: Bowel sounds are normal. There is no distension. Palpations: Abdomen is soft. Tenderness: There is no abdominal tenderness. There is no rebound. Musculoskeletal: Normal range of motion. General: No tenderness. Skin:     General: Skin is warm and dry. Findings: No erythema or rash. Neurological:      Mental Status: She is alert and oriented to person, place, and time. Psychiatric:         Behavior: Behavior normal.         Thought Content: Thought content normal.         Judgment: Judgment normal.     /68 (Site: Right Upper Arm, Position: Sitting, Cuff Size: Large Adult)   Pulse 72   Ht 5' 2.01\" (1.575 m)   Wt 167 lb (75.8 kg)   BMI 30.54 kg/m²       ASSESSMENT/PLAN:  Encounter Diagnoses   Name Primary?  Acute pain of both knees Yes    Rash and nonspecific skin eruption     Paresthesias in right hand      Bilateral knee pain. Likely her newer job in the last 2 years. On concrete all day standing. Knees more problematic in the evening and night after work. No swelling at present. Topicals not helping. Rec. Glucosamine chondroitin. Rec. Naproxen 500mg bid with food. Side effects discussed. Rash: dull, erythematous macules. Mostly scalp and right axillary . No significant scale at present. Suspect psoriasis vs other. Guttate psoriasis. Numbness of right hand. Also likely overuse injury at work. Distribution to entire hand atypical for CTS, but positive phalen's test today. Thoracic outlet less likely. Discussed cock up splint, stretching exercises for the hands and wrists, and naproxen trial as above. Alt elevation to 133 on July labs. She has some alcohol use she doesn't think is that excessive. She is going to be abstinent for 2 weeks and recheck. Asx. Will likely hold statin next and proceed with hepatitis screening. An electronic signature was used to authenticate this note.     --Hillary Driscoll MD on 1/29/2020 at 9:33 AM

## 2020-02-19 RX ORDER — HYDROCODONE BITARTRATE AND ACETAMINOPHEN 5; 325 MG/1; MG/1
1 TABLET ORAL 2 TIMES DAILY PRN
Qty: 60 TABLET | Refills: 0 | Status: SHIPPED | OUTPATIENT
Start: 2020-02-19 | End: 2020-03-18 | Stop reason: SDUPTHER

## 2020-02-19 RX ORDER — ALPRAZOLAM 1 MG/1
1 TABLET ORAL 2 TIMES DAILY PRN
Qty: 60 TABLET | Refills: 0 | Status: CANCELLED | OUTPATIENT
Start: 2020-02-19 | End: 2020-03-20

## 2020-02-21 ENCOUNTER — HOSPITAL ENCOUNTER (OUTPATIENT)
Dept: LAB | Age: 55
Discharge: HOME | End: 2020-02-21
Payer: COMMERCIAL

## 2020-02-21 LAB
ALBUMIN SERPL-MCNC: 4.1 G/DL (ref 3.5–5.2)
ALBUMIN/GLOBULIN RATIO: 1.6 (ref 1–2.5)
ALP BLD-CCNC: 88 U/L (ref 35–104)
ALT SERPL-CCNC: 31 U/L (ref 5–33)
AST SERPL-CCNC: 24 U/L
BILIRUB SERPL-MCNC: 0.37 MG/DL (ref 0.3–1.2)
BILIRUBIN DIRECT: 0.09 MG/DL
BILIRUBIN, INDIRECT: 0.28 MG/DL (ref 0–1)
GLOBULIN: 2.6 G/DL (ref 1.5–3.8)
TOTAL PROTEIN: 6.7 G/DL (ref 6.4–8.3)

## 2020-02-21 PROCEDURE — 80076 HEPATIC FUNCTION PANEL: CPT

## 2020-02-21 PROCEDURE — 36415 COLL VENOUS BLD VENIPUNCTURE: CPT

## 2020-02-26 ENCOUNTER — TELEPHONE (OUTPATIENT)
Dept: FAMILY MEDICINE CLINIC | Age: 55
End: 2020-02-26

## 2020-02-26 RX ORDER — ALPRAZOLAM 1 MG/1
1 TABLET ORAL 2 TIMES DAILY PRN
Qty: 60 TABLET | Refills: 0 | Status: SHIPPED | OUTPATIENT
Start: 2020-02-26 | End: 2020-03-24 | Stop reason: SDUPTHER

## 2020-02-26 NOTE — TELEPHONE ENCOUNTER
Patient calling stating that GO told her to stop her cholesterol medication until a liver function test was completed Patient is asking if she can restart the medication.

## 2020-03-18 RX ORDER — HYDROCODONE BITARTRATE AND ACETAMINOPHEN 5; 325 MG/1; MG/1
1 TABLET ORAL 2 TIMES DAILY PRN
Qty: 60 TABLET | Refills: 0 | Status: SHIPPED | OUTPATIENT
Start: 2020-03-18 | End: 2020-04-17 | Stop reason: SDUPTHER

## 2020-03-18 NOTE — TELEPHONE ENCOUNTER
[place] from [place] reviewed, last filled 2/19/2020 #60 for a 30 day supply. Report available for your review. Last OV 1/29/2020; no future OV scheduled.

## 2020-03-24 RX ORDER — ALPRAZOLAM 1 MG/1
1 TABLET ORAL 2 TIMES DAILY PRN
Qty: 60 TABLET | Refills: 0 | Status: SHIPPED | OUTPATIENT
Start: 2020-03-24 | End: 2020-04-27 | Stop reason: SDUPTHER

## 2020-03-24 NOTE — TELEPHONE ENCOUNTER
Autumn Ramos called requesting a refill of the below medication which has been pended for you: [place] from [place] reviewed. Alprazolam 1 mg last filled 2/26/20 #60/30days. Report available for your review. Requested Prescriptions     Pending Prescriptions Disp Refills    ALPRAZolam (XANAX) 1 MG tablet 60 tablet 0     Sig: Take 1 tablet by mouth 2 times daily as needed for Anxiety for up to 30 days. Last Appointment Date: 1/29/2020  Next Appointment Date: Visit date not found    Allergies   Allergen Reactions    Requip [Ropinirole] Other (See Comments)     Hypotension and syncope.

## 2020-04-17 RX ORDER — HYDROCODONE BITARTRATE AND ACETAMINOPHEN 5; 325 MG/1; MG/1
1 TABLET ORAL 2 TIMES DAILY PRN
Qty: 60 TABLET | Refills: 0 | Status: SHIPPED | OUTPATIENT
Start: 2020-04-17 | End: 2020-05-21 | Stop reason: SDUPTHER

## 2020-04-17 NOTE — TELEPHONE ENCOUNTER
Divine Cohn called requesting a refill of the below medication which has been pended for you: [place] from [place] reviewed. NORCO 5-325 mg last filled 60/30days. Report available for your review. Requested Prescriptions     Pending Prescriptions Disp Refills    HYDROcodone-acetaminophen (NORCO) 5-325 MG per tablet 60 tablet 0     Sig: Take 1 tablet by mouth 2 times daily as needed for Pain for up to 30 days. Last Appointment Date: 1/29/2020  Next Appointment Date: Visit date not found    Allergies   Allergen Reactions    Requip [Ropinirole] Other (See Comments)     Hypotension and syncope.

## 2020-04-27 RX ORDER — ALPRAZOLAM 1 MG/1
1 TABLET ORAL 2 TIMES DAILY PRN
Qty: 60 TABLET | Refills: 0 | Status: SHIPPED | OUTPATIENT
Start: 2020-04-27 | End: 2020-05-28 | Stop reason: SDUPTHER

## 2020-04-27 NOTE — TELEPHONE ENCOUNTER
Kelton Greco called requesting a refill of the below medication which has been pended for you: [place] from [place] reviewed. Alprazolam 1 mg last filled 3/27/20 #60/30days. Report available for your review. Requested Prescriptions     Pending Prescriptions Disp Refills    ALPRAZolam (XANAX) 1 MG tablet 60 tablet 0     Sig: Take 1 tablet by mouth 2 times daily as needed for Anxiety for up to 30 days. Last Appointment Date: 1/29/2020  Next Appointment Date: Visit date not found    Allergies   Allergen Reactions    Requip [Ropinirole] Other (See Comments)     Hypotension and syncope.

## 2020-05-08 ENCOUNTER — TELEPHONE (OUTPATIENT)
Dept: FAMILY MEDICINE CLINIC | Age: 55
End: 2020-05-08

## 2020-05-13 ENCOUNTER — VIRTUAL VISIT (OUTPATIENT)
Dept: FAMILY MEDICINE CLINIC | Age: 55
End: 2020-05-13
Payer: COMMERCIAL

## 2020-05-13 VITALS — BODY MASS INDEX: 30.58 KG/M2 | WEIGHT: 162 LBS | HEIGHT: 61 IN

## 2020-05-13 PROCEDURE — 99213 OFFICE O/P EST LOW 20 MIN: CPT | Performed by: FAMILY MEDICINE

## 2020-05-13 RX ORDER — SIMVASTATIN 20 MG
20 TABLET ORAL NIGHTLY
Qty: 90 TABLET | Refills: 1 | Status: SHIPPED | OUTPATIENT
Start: 2020-05-13 | End: 2020-09-18 | Stop reason: SDUPTHER

## 2020-05-13 NOTE — PROGRESS NOTES
2020     Baylee Barron (:  1965) is a 54 y.o. female, here for evaluation of the following medical concerns:    HPI   Acute visit for recent right breast discomfort over the last month. She reports pain near the axillary area on the right. Visit scheduled as video conference visit during covid 19 restrictions. Patient consented to video conference visit using her cell phone . I am connecting using "Mobile Location, IP" software from the office. Area seems tender along the anterior axillary line as best she can describe. No redness or skin changes. Nothing she can feel as far as a mass or bump. Past Medical History:   Diagnosis Date    Alcohol abuse     Carpal tunnel syndrome, bilateral     Chronic back pain     Depression with anxiety     HGSIL (high grade squamous intraepithelial lesion) on Pap smear     Insomnia     Migraine     Recreational drug use     Marijuana.  Tobacco abuse      Past Surgical History:   Procedure Laterality Date     SECTION      COLPOSCOPY  2002    With laser cone biopsy of the cervix.  TUBAL LIGATION           Review of Systems   Constitutional: Negative. HENT: Negative. Eyes: Negative. Respiratory: Negative. Cardiovascular: Positive for chest pain (right axillary /breast). Gastrointestinal: Negative. Endocrine: Negative. Genitourinary: Negative. Musculoskeletal: Negative. Skin: Negative. Allergic/Immunologic: Negative. Neurological: Negative. Hematological: Negative. Psychiatric/Behavioral: Negative. Prior to Visit Medications    Medication Sig Taking? Authorizing Provider   ALPRAZolam Lora Ayaz) 1 MG tablet Take 1 tablet by mouth 2 times daily as needed for Anxiety for up to 30 days. Yes Mario Bhardwaj MD   HYDROcodone-acetaminophen (NORCO) 5-325 MG per tablet Take 1 tablet by mouth 2 times daily as needed for Pain for up to 30 days.  Yes Mario Bhardwaj MD   fluocinonide-emollient ([address] [place] [address]) 0.05 % cream Apply topically 2 times daily. Yes Corby Florez MD   naproxen (NAPROSYN) 500 MG tablet Take 1 tablet by mouth 2 times daily (with meals) Yes Corby Florez MD   clotrimazole-betamethasone (LOTRISONE) 1-0.05 % cream Apply topically 2 times daily. Yes Corby Florez MD   triamcinolone (KENALOG) 0.1 % cream Apply topically 2 times daily. Yes Corby Florez MD   simvastatin (ZOCOR) 20 MG tablet Take 1 tablet by mouth nightly Yes Corby Florez MD   aspirin 81 MG tablet Take 81 mg by mouth daily Yes Historical Provider, MD        Social History     Tobacco Use    Smoking status: Current Every Day Smoker     Packs/day: 0.00     Years: 37.00     Pack years: 0.00     Types: Cigarettes     Last attempt to quit: 2018     Years since quittin.2    Smokeless tobacco: Never Used    Tobacco comment: a pack a day   Substance Use Topics    Alcohol use: Yes     Alcohol/week: 0.0 standard drinks     Comment: occasionally        Vitals:    20 0949   Weight: 162 lb (73.5 kg)   Height: 5' 1\" (1.549 m)     Estimated body mass index is 30.61 kg/m² as calculated from the following:    Height as of this encounter: 5' 1\" (1.549 m). Weight as of this encounter: 162 lb (73.5 kg). Physical Exam  Constitutional:       Appearance: Normal appearance. HENT:      Head: Normocephalic and atraumatic. Nose: Nose normal.   Neck:      Musculoskeletal: Normal range of motion. Pulmonary:      Effort: Pulmonary effort is normal. No respiratory distress. Neurological:      Mental Status: She is alert. Psychiatric:         Mood and Affect: Mood normal.         Behavior: Behavior normal.         Thought Content: Thought content normal.         Judgment: Judgment normal.     Ht 5' 1\" (1.549 m)   Wt 162 lb (73.5 kg)   LMP 2017 (Within Months)   Breastfeeding No   BMI 30.61 kg/m²       ASSESSMENT/PLAN:  Encounter Diagnosis   Name Primary?  Breast pain Yes     Source unclear.   She is due for

## 2020-05-21 RX ORDER — HYDROCODONE BITARTRATE AND ACETAMINOPHEN 5; 325 MG/1; MG/1
1 TABLET ORAL 2 TIMES DAILY PRN
Qty: 60 TABLET | Refills: 0 | Status: SHIPPED | OUTPATIENT
Start: 2020-05-21 | End: 2020-06-19 | Stop reason: SDUPTHER

## 2020-05-28 RX ORDER — ALPRAZOLAM 1 MG/1
1 TABLET ORAL 2 TIMES DAILY PRN
Qty: 60 TABLET | Refills: 0 | Status: SHIPPED | OUTPATIENT
Start: 2020-05-28 | End: 2020-06-26 | Stop reason: SDUPTHER

## 2020-05-28 NOTE — TELEPHONE ENCOUNTER
Vahe Monet called requesting a refill of the below medication which has been pended for you: [facility] from [place] reviewed. Alprazolam 1 mg last filled 4/27/20 #60/30days. Report available for your review. Requested Prescriptions     Pending Prescriptions Disp Refills    ALPRAZolam (XANAX) 1 MG tablet 60 tablet 0     Sig: Take 1 tablet by mouth 2 times daily as needed for Anxiety for up to 30 days. Last Appointment Date: 1/29/2020  Next Appointment Date: Visit date not found    Allergies   Allergen Reactions    Requip [Ropinirole] Other (See Comments)     Hypotension and syncope.

## 2020-06-19 RX ORDER — HYDROCODONE BITARTRATE AND ACETAMINOPHEN 5; 325 MG/1; MG/1
1 TABLET ORAL 2 TIMES DAILY PRN
Qty: 60 TABLET | Refills: 0 | Status: SHIPPED | OUTPATIENT
Start: 2020-06-19 | End: 2020-07-20 | Stop reason: SDUPTHER

## 2020-06-26 RX ORDER — ALPRAZOLAM 1 MG/1
1 TABLET ORAL 2 TIMES DAILY PRN
Qty: 60 TABLET | Refills: 0 | Status: SHIPPED | OUTPATIENT
Start: 2020-06-26 | End: 2020-07-27 | Stop reason: SDUPTHER

## 2020-07-20 RX ORDER — HYDROCODONE BITARTRATE AND ACETAMINOPHEN 5; 325 MG/1; MG/1
1 TABLET ORAL 2 TIMES DAILY PRN
Qty: 60 TABLET | Refills: 0 | Status: SHIPPED | OUTPATIENT
Start: 2020-07-20 | End: 2020-08-20 | Stop reason: SDUPTHER

## 2020-07-27 RX ORDER — ALPRAZOLAM 1 MG/1
1 TABLET ORAL 2 TIMES DAILY PRN
Qty: 60 TABLET | Refills: 0 | Status: SHIPPED | OUTPATIENT
Start: 2020-07-27 | End: 2020-08-26 | Stop reason: SDUPTHER

## 2020-07-27 NOTE — TELEPHONE ENCOUNTER
[facility] reviewed  Last fill 06/26/20  #60/30days   Last OV-05/13/20  None scheduled for upcoming appts

## 2020-08-21 RX ORDER — HYDROCODONE BITARTRATE AND ACETAMINOPHEN 5; 325 MG/1; MG/1
1 TABLET ORAL 2 TIMES DAILY PRN
Qty: 60 TABLET | Refills: 0 | Status: SHIPPED | OUTPATIENT
Start: 2020-08-21 | End: 2020-09-18 | Stop reason: SDUPTHER

## 2020-09-18 ENCOUNTER — HOSPITAL ENCOUNTER (OUTPATIENT)
Dept: MAMMOGRAPHY | Age: 55
Discharge: HOME | End: 2020-09-20
Payer: COMMERCIAL

## 2020-09-18 ENCOUNTER — HOSPITAL ENCOUNTER (OUTPATIENT)
Dept: ULTRASOUND IMAGING | Age: 55
Discharge: HOME | End: 2020-09-20
Payer: COMMERCIAL

## 2020-09-18 PROCEDURE — 76642 ULTRASOUND BREAST LIMITED: CPT

## 2020-09-18 PROCEDURE — G0279 TOMOSYNTHESIS, MAMMO: HCPCS

## 2020-09-18 RX ORDER — HYDROCODONE BITARTRATE AND ACETAMINOPHEN 5; 325 MG/1; MG/1
1 TABLET ORAL 2 TIMES DAILY PRN
Qty: 60 TABLET | Refills: 0 | Status: SHIPPED | OUTPATIENT
Start: 2020-09-18 | End: 2020-10-19 | Stop reason: SDUPTHER

## 2020-09-18 RX ORDER — SIMVASTATIN 20 MG
20 TABLET ORAL NIGHTLY
Qty: 90 TABLET | Refills: 1 | Status: SHIPPED | OUTPATIENT
Start: 2020-09-18 | End: 2021-09-07 | Stop reason: SDUPTHER

## 2020-09-18 RX ORDER — TRIAMCINOLONE ACETONIDE 1 MG/G
CREAM TOPICAL
Qty: 80 G | Refills: 2 | Status: SHIPPED | OUTPATIENT
Start: 2020-09-18 | End: 2021-09-07 | Stop reason: SDUPTHER

## 2020-09-25 RX ORDER — ALPRAZOLAM 1 MG/1
1 TABLET ORAL 2 TIMES DAILY PRN
Qty: 60 TABLET | Refills: 0 | Status: SHIPPED | OUTPATIENT
Start: 2020-09-25 | End: 2020-10-25 | Stop reason: SDUPTHER

## 2020-09-25 NOTE — TELEPHONE ENCOUNTER
Sunday Avelino called requesting a refill of the below medication which has been pended for you: [facility] from [place] reviewed. Alprazolam 1 mg last filled 8/27/20 #60/30 days. Report available for your review. Requested Prescriptions     Pending Prescriptions Disp Refills    ALPRAZolam (XANAX) 1 MG tablet 60 tablet 0     Sig: Take 1 tablet by mouth 2 times daily as needed for Anxiety for up to 30 days. Last Appointment Date: 5/13/20  Next Appointment Date:     Allergies   Allergen Reactions    Requip [Ropinirole] Other (See Comments)     Hypotension and syncope.

## 2020-10-19 RX ORDER — HYDROCODONE BITARTRATE AND ACETAMINOPHEN 5; 325 MG/1; MG/1
1 TABLET ORAL 2 TIMES DAILY PRN
Qty: 60 TABLET | Refills: 0 | Status: SHIPPED | OUTPATIENT
Start: 2020-10-19 | End: 2020-11-19 | Stop reason: SDUPTHER

## 2020-11-17 ENCOUNTER — OFFICE VISIT (OUTPATIENT)
Dept: FAMILY MEDICINE CLINIC | Age: 55
End: 2020-11-17
Payer: COMMERCIAL

## 2020-11-17 VITALS
DIASTOLIC BLOOD PRESSURE: 72 MMHG | WEIGHT: 154 LBS | SYSTOLIC BLOOD PRESSURE: 126 MMHG | HEIGHT: 61 IN | HEART RATE: 68 BPM | BODY MASS INDEX: 29.07 KG/M2

## 2020-11-17 PROCEDURE — 99214 OFFICE O/P EST MOD 30 MIN: CPT | Performed by: FAMILY MEDICINE

## 2020-11-17 RX ORDER — NAPROXEN 500 MG/1
500 TABLET ORAL 2 TIMES DAILY WITH MEALS
Qty: 180 TABLET | Refills: 3 | Status: SHIPPED | OUTPATIENT
Start: 2020-11-17 | End: 2021-03-23 | Stop reason: SINTOL

## 2020-11-17 RX ORDER — CLOTRIMAZOLE AND BETAMETHASONE DIPROPIONATE 10; .64 MG/G; MG/G
CREAM TOPICAL
Qty: 45 G | Refills: 1 | Status: SHIPPED | OUTPATIENT
Start: 2020-11-17 | End: 2021-04-29 | Stop reason: SDUPTHER

## 2020-11-17 ASSESSMENT — PATIENT HEALTH QUESTIONNAIRE - PHQ9
SUM OF ALL RESPONSES TO PHQ QUESTIONS 1-9: 0
SUM OF ALL RESPONSES TO PHQ QUESTIONS 1-9: 0
1. LITTLE INTEREST OR PLEASURE IN DOING THINGS: 0
2. FEELING DOWN, DEPRESSED OR HOPELESS: 0
SUM OF ALL RESPONSES TO PHQ QUESTIONS 1-9: 0
SUM OF ALL RESPONSES TO PHQ9 QUESTIONS 1 & 2: 0

## 2020-11-17 ASSESSMENT — ENCOUNTER SYMPTOMS
EYES NEGATIVE: 1
ALLERGIC/IMMUNOLOGIC NEGATIVE: 1
GASTROINTESTINAL NEGATIVE: 1
RESPIRATORY NEGATIVE: 1

## 2020-11-17 NOTE — PROGRESS NOTES
2020     Tavia Courser (:  1965) is a 54 y.o. female, here for evaluation of the following medical concerns:    HPI   Acute visit for finger pain. Noticing basal joints of thumbs very painful. 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 Thumb pain problematic at present. She worries about picking up the grand baby. She needs help getting lids off at home. Some scalp lesions to check. Pruritic , scaling lesion over a year. Past Medical History:   Diagnosis Date    Alcohol abuse     Carpal tunnel syndrome, bilateral     Chronic back pain     Depression with anxiety     HGSIL (high grade squamous intraepithelial lesion) on Pap smear     Insomnia     Migraine     Recreational drug use     Marijuana.  Tobacco abuse      Past Surgical History:   Procedure Laterality Date     SECTION      COLPOSCOPY  2002    With laser cone biopsy of the cervix.  TUBAL LIGATION           Review of Systems   Constitutional: Negative. HENT: Negative. Eyes: Negative. Respiratory: Negative. Cardiovascular: Negative. Gastrointestinal: Negative. Endocrine: Negative. Genitourinary: Negative. Musculoskeletal: Positive for arthralgias. Skin: Positive for rash. Allergic/Immunologic: Negative. Neurological: Negative. Hematological: Negative. Psychiatric/Behavioral: Negative. Prior to Visit Medications    Medication Sig Taking? Authorizing Provider   ALPRAZolam Jurline Savers) 1 MG tablet Take 1 tablet by mouth 2 times daily as needed for Anxiety for up to 30 days. Yes SEAN Calabrese - CNP   HYDROcodone-acetaminophen (NORCO) 5-325 MG per tablet Take 1 tablet by mouth 2 times daily as needed for Pain for up to 30 days. Yes Cris Garrison MD   triamcinolone (KENALOG) 0.1 % cream Apply topically 2 times daily.  Yes Cris Garrison MD   simvastatin (ZOCOR) 20 MG tablet Take 1 tablet by mouth nightly Yes Timoteo Gil MD   fluocinonide-emollient (LIDEX-E) 0.05 % cream Apply topically 2 times daily. Yes Timoteo Gil MD   naproxen (NAPROSYN) 500 MG tablet Take 1 tablet by mouth 2 times daily (with meals) Yes Timoteo Gil MD   clotrimazole-betamethasone (LOTRISONE) 1-0.05 % cream Apply topically 2 times daily. Yes Timoteo Gil MD   aspirin 81 MG tablet Take 81 mg by mouth daily Yes Historical Provider, MD        Social History     Tobacco Use    Smoking status: Former Smoker     Packs/day: 0.00     Years: 37.00     Pack years: 0.00     Types: Cigarettes     Last attempt to quit: 2018     Years since quittin.7    Smokeless tobacco: Never Used   Substance Use Topics    Alcohol use: Yes     Alcohol/week: 0.0 standard drinks     Comment: occasionally        Vitals:    20 0943   BP: 126/72   Site: Right Upper Arm   Position: Sitting   Cuff Size: Large Adult   Pulse: 68   Weight: 154 lb (69.9 kg)   Height: 5' 1\" (1.549 m)     Estimated body mass index is 29.1 kg/m² as calculated from the following:    Height as of this encounter: 5' 1\" (1.549 m). Weight as of this encounter: 154 lb (69.9 kg). Physical Exam  Constitutional:       General: She is not in acute distress. HENT:      Head: Normocephalic. Nose: Nose normal.   Eyes:      Pupils: Pupils are equal, round, and reactive to light. Cardiovascular:      Rate and Rhythm: Normal rate. Pulses: Normal pulses. Pulmonary:      Effort: Pulmonary effort is normal.   Abdominal:      General: There is no distension. Musculoskeletal: Normal range of motion. Right hand: She exhibits tenderness (thumb carpal/metacarpal joint. ). Left hand: She exhibits tenderness (thumb cmc). Skin:     Findings: Rash present. Neurological:      Mental Status: She is alert.      /72 (Site: Right Upper Arm, Position: Sitting, Cuff Size: Large Adult)   Pulse 68   Ht 5' 1\" (1.549 m)   Wt 154 lb (69.9 kg)   LMP 2017 (Within Months)   BMI 29.10 kg/m²       ASSESSMENT/PLAN:  Encounter Diagnoses   Name Primary?  Arthritis of carpometacarpal (CMC) joint of both thumbs Yes    Rash      Rec. Cont. Naproxen bid with food. Consider injection trial next. Surgical option discussed. Consider thumb spica braces at work. Rash: large scaling macule back of scalp, over a year. Fungal vs psoriasis. Would favor psoriasis. Can use triamcinolone and lotrisone creams she has. Derm consult re: biopsy. An electronic signature was used to authenticate this note.     --Mike Garcia MD on 11/17/2020 at 10:08 AM

## 2020-11-19 RX ORDER — HYDROCODONE BITARTRATE AND ACETAMINOPHEN 5; 325 MG/1; MG/1
1 TABLET ORAL 2 TIMES DAILY PRN
Qty: 60 TABLET | Refills: 0 | Status: SHIPPED | OUTPATIENT
Start: 2020-11-19 | End: 2020-12-18 | Stop reason: SDUPTHER

## 2020-11-19 NOTE — TELEPHONE ENCOUNTER
[address] called requesting a refill of the below medication which has been pended for you: [facility] from [place] reviewed. NORCO 5-325 mg last filled 10/20/20 #60/30 days. Report available for your review. Requested Prescriptions     Pending Prescriptions Disp Refills    HYDROcodone-acetaminophen (NORCO) 5-325 MG per tablet 60 tablet 0     Sig: Take 1 tablet by mouth 2 times daily as needed for Pain for up to 30 days. Last Appointment Date: 11/17/2020  Next Appointment Date: Visit date not found    Allergies   Allergen Reactions    Requip [Ropinirole] Other (See Comments)     Hypotension and syncope.

## 2020-11-24 NOTE — TELEPHONE ENCOUNTER
Aniceto Nguyen called requesting a refill of the below medication which has been pended for you: [facility] from [place] reviewed. Alprazolam 1 mg last filled 10/26/20 #60/30 days. Report available for your review. Requested Prescriptions     Pending Prescriptions Disp Refills    ALPRAZolam (XANAX) 1 MG tablet 60 tablet 0     Sig: Take 1 tablet by mouth 2 times daily as needed for Anxiety for up to 30 days. Last Appointment Date: 11/17/2020  Next Appointment Date: Visit date not found    Allergies   Allergen Reactions    Requip [Ropinirole] Other (See Comments)     Hypotension and syncope.

## 2020-11-25 RX ORDER — ALPRAZOLAM 1 MG/1
1 TABLET ORAL 2 TIMES DAILY PRN
Qty: 60 TABLET | Refills: 0 | Status: SHIPPED | OUTPATIENT
Start: 2020-11-25 | End: 2020-12-28 | Stop reason: SDUPTHER

## 2020-12-18 NOTE — TELEPHONE ENCOUNTER
Susana Adan called requesting a refill of the below medication which has been pended for you: last filled 11/19/2020 #60    Requested Prescriptions     Pending Prescriptions Disp Refills    HYDROcodone-acetaminophen (NORCO) 5-325 MG per tablet 60 tablet 0     Sig: Take 1 tablet by mouth 2 times daily as needed for Pain for up to 30 days. Last Appointment Date: 11/17/2020  Next Appointment Date: Visit date not found    Allergies   Allergen Reactions    Requip [Ropinirole] Other (See Comments)     Hypotension and syncope.

## 2020-12-21 RX ORDER — HYDROCODONE BITARTRATE AND ACETAMINOPHEN 5; 325 MG/1; MG/1
1 TABLET ORAL 2 TIMES DAILY PRN
Qty: 60 TABLET | Refills: 0 | Status: SHIPPED | OUTPATIENT
Start: 2020-12-21 | End: 2021-01-18 | Stop reason: SDUPTHER

## 2020-12-28 RX ORDER — ALPRAZOLAM 1 MG/1
1 TABLET ORAL 2 TIMES DAILY PRN
Qty: 60 TABLET | Refills: 0 | Status: SHIPPED | OUTPATIENT
Start: 2020-12-28 | End: 2020-12-28 | Stop reason: SDUPTHER

## 2020-12-28 NOTE — TELEPHONE ENCOUNTER
Sheng Freitas called requesting a refill of the below medication which has been pended for you: [facility] from [place]. Alprazolam 1 mg last filled 11/25/20 #60/30 days. Report available for your review. Requested Prescriptions     Pending Prescriptions Disp Refills    ALPRAZolam (XANAX) 1 MG tablet 60 tablet 0     Sig: Take 1 tablet by mouth 2 times daily as needed for Anxiety for up to 30 days. Last Appointment Date: 11/17/2020  Next Appointment Date: Visit date not found    Allergies   Allergen Reactions    Requip [Ropinirole] Other (See Comments)     Hypotension and syncope.

## 2020-12-29 RX ORDER — ALPRAZOLAM 1 MG/1
1 TABLET ORAL 2 TIMES DAILY PRN
Qty: 60 TABLET | Refills: 0 | Status: SHIPPED | OUTPATIENT
Start: 2020-12-29 | End: 2021-01-26 | Stop reason: SDUPTHER

## 2021-01-07 NOTE — TELEPHONE ENCOUNTER
[facility] reviewed  Last fill 08/31/20  #60/30days Intermediate Repair Preamble Text (Leave Blank If You Do Not Want): Undermining was performed with blunt dissection.

## 2021-01-18 DIAGNOSIS — G89.29 CHRONIC BILATERAL LOW BACK PAIN WITHOUT SCIATICA: ICD-10-CM

## 2021-01-18 DIAGNOSIS — M54.50 CHRONIC BILATERAL LOW BACK PAIN WITHOUT SCIATICA: ICD-10-CM

## 2021-01-19 RX ORDER — HYDROCODONE BITARTRATE AND ACETAMINOPHEN 5; 325 MG/1; MG/1
1 TABLET ORAL 2 TIMES DAILY PRN
Qty: 60 TABLET | Refills: 0 | Status: SHIPPED | OUTPATIENT
Start: 2021-01-19 | End: 2021-02-19 | Stop reason: SDUPTHER

## 2021-01-26 DIAGNOSIS — F41.9 ANXIETY: ICD-10-CM

## 2021-01-26 RX ORDER — ALPRAZOLAM 1 MG/1
1 TABLET ORAL 2 TIMES DAILY PRN
Qty: 60 TABLET | Refills: 0 | Status: SHIPPED | OUTPATIENT
Start: 2021-01-26 | End: 2021-02-25 | Stop reason: SDUPTHER

## 2021-01-28 ENCOUNTER — OFFICE VISIT (OUTPATIENT)
Dept: OPTOMETRY | Age: 56
End: 2021-01-28
Payer: COMMERCIAL

## 2021-01-28 DIAGNOSIS — H52.4 HYPEROPIA OF BOTH EYES WITH ASTIGMATISM AND PRESBYOPIA: Primary | ICD-10-CM

## 2021-01-28 DIAGNOSIS — H52.03 HYPEROPIA OF BOTH EYES WITH ASTIGMATISM AND PRESBYOPIA: Primary | ICD-10-CM

## 2021-01-28 DIAGNOSIS — H52.203 HYPEROPIA OF BOTH EYES WITH ASTIGMATISM AND PRESBYOPIA: Primary | ICD-10-CM

## 2021-01-28 PROCEDURE — 92004 COMPRE OPH EXAM NEW PT 1/>: CPT | Performed by: OPTOMETRIST

## 2021-01-28 ASSESSMENT — ENCOUNTER SYMPTOMS
RESPIRATORY NEGATIVE: 0
GASTROINTESTINAL NEGATIVE: 0
EYES NEGATIVE: 0
ALLERGIC/IMMUNOLOGIC NEGATIVE: 0

## 2021-01-28 ASSESSMENT — REFRACTION_WEARINGRX
OS_CYLINDER: DS
OD_AXIS: 066
OD_ADD: +2.00
OS_SPHERE: +0.25
OD_SPHERE: +0.50
OD_CYLINDER: -0.25
SPECS_TYPE: BIFOCAL
OS_ADD: +2.00

## 2021-01-28 ASSESSMENT — REFRACTION_MANIFEST
OS_CYLINDER: DS
OD_AXIS: 075
OS_ADD: +2.00
OS_SPHERE: +0.25
OD_SPHERE: +0.75
OD_ADD: +2.00
OD_CYLINDER: -0.25

## 2021-01-28 ASSESSMENT — KERATOMETRY
OS_AXISANGLE2_DEGREES: 005
OS_AXISANGLE_DEGREES: 095
OS_K1POWER_DIOPTERS: 45.25
METHOD_AUTO_MANUAL: AUTOMATED
OS_K2POWER_DIOPTERS: 45.50

## 2021-01-28 ASSESSMENT — TONOMETRY
OD_IOP_MMHG: 17
IOP_METHOD: NON-CONTACT AIR PUFF
OS_IOP_MMHG: 16

## 2021-01-28 ASSESSMENT — VISUAL ACUITY
OD_SC: 20/30 OU
OD_SC+: -1
OS_SC: 20/20
OD_SC: 20/25
METHOD: SNELLEN - LINEAR

## 2021-01-28 ASSESSMENT — SLIT LAMP EXAM - LIDS
COMMENTS: NORMAL
COMMENTS: NORMAL

## 2021-01-28 NOTE — PROGRESS NOTES
Dasha Moralez presents today for   Chief Complaint   Patient presents with    Blurred Vision    Vision Exam   .    HPI     Blurred Vision     In both eyes. Vision is blurred. Context:  distance vision and reading. Comments     Last Vision Exam: 10/19/2015 AW  Last Ophthalmology Exam: n/a  Last Filled Glasses Rx: 10/19/2015  Insurance: [employer]  Update: Glasses  Distance and reading are getting more blurry  Wears OTC readers or squint all the time. Doesn't have the last glasses any more ; wearing otc readers                Current Outpatient Medications   Medication Sig Dispense Refill    ALPRAZolam (XANAX) 1 MG tablet Take 1 tablet by mouth 2 times daily as needed for Anxiety for up to 30 days. 60 tablet 0    HYDROcodone-acetaminophen (NORCO) 5-325 MG per tablet Take 1 tablet by mouth 2 times daily as needed for Pain for up to 30 days. 60 tablet 0    naproxen (NAPROSYN) 500 MG tablet Take 1 tablet by mouth 2 times daily (with meals) 180 tablet 3    clotrimazole-betamethasone (LOTRISONE) 1-0.05 % cream Apply topically 2 times daily. 45 g 1    triamcinolone (KENALOG) 0.1 % cream Apply topically 2 times daily. 80 g 2    simvastatin (ZOCOR) 20 MG tablet Take 1 tablet by mouth nightly 90 tablet 1    fluocinonide-emollient (LIDEX-E) 0.05 % cream Apply topically 2 times daily. 60 g 1    aspirin 81 MG tablet Take 81 mg by mouth daily       No current facility-administered medications for this visit. Family History   Problem Relation Age of Onset    Heart Attack Father          of myocardial infarction.     Other Father         MS    Heart Disease Father     Diabetes Neg Hx     Glaucoma Neg Hx     Cataracts Neg Hx      Social History     Socioeconomic History    Marital status: Single     Spouse name: None    Number of children: None    Years of education: None    Highest education level: None   Occupational History    None   Social Needs    Financial resource strain: None  Food insecurity     Worry: None     Inability: None    Transportation needs     Medical: None     Non-medical: None   Tobacco Use    Smoking status: Former Smoker     Packs/day: 0.00     Years: 37.00     Pack years: 0.00     Types: Cigarettes     Quit date: 2018     Years since quittin.9    Smokeless tobacco: Never Used   Substance and Sexual Activity    Alcohol use: Yes     Alcohol/week: 0.0 standard drinks     Comment: occasionally    Drug use: No    Sexual activity: None   Lifestyle    Physical activity     Days per week: None     Minutes per session: None    Stress: None   Relationships    Social connections     Talks on phone: None     Gets together: None     Attends [employer] service: None     Active member of club or organization: None     Attends meetings of clubs or organizations: None     Relationship status: None    Intimate partner violence     Fear of current or ex partner: None     Emotionally abused: None     Physically abused: None     Forced sexual activity: None   Other Topics Concern    None   Social History Narrative    None     Past Medical History:   Diagnosis Date    Alcohol abuse     Carpal tunnel syndrome, bilateral     Chronic back pain     Depression with anxiety     HGSIL (high grade squamous intraepithelial lesion) on Pap smear     Insomnia     Migraine     Recreational drug use     Marijuana.     Tobacco abuse        ROS     Negative for: Constitutional, Gastrointestinal, Neurological, Skin, Genitourinary, Musculoskeletal, HENT, Endocrine, Cardiovascular, Eyes, Respiratory, Psychiatric, Allergic/Imm, Heme/Lymph          Main Ophthalmology Exam     External Exam       Right Left    External Normal Normal          Slit Lamp Exam       Right Left    Lids/Lashes Normal Normal    Conjunctiva/Sclera White and quiet White and quiet    Cornea Clear Clear    Anterior Chamber Deep and quiet Deep and quiet    Iris Round and reactive Round and reactive Lens Trace Nuclear sclerosis Trace Nuclear sclerosis    Vitreous Normal Normal          Fundus Exam       Right Left    Disc Normal Normal    C/D Ratio 0.05 0.05    Macula Normal Normal    Vessels Normal Normal                   Tonometry     Tonometry (Non-contact air puff, 9:03 AM)       Right Left    Pressure 17 16   IOP.2             152  CH:  9.9          9.8  WS: 6.9          5.8                  Not recorded         Not recorded          Visual Acuity (Snellen - Linear)       Right Left    Dist sc 20/25 -1 20/20    Near sc 20/30 OU           Pupils     Pupils       Pupils    Right PERRL    Left PERRL              Neuro/Psych     Neuro/Psych     Oriented x3: Yes    Mood/Affect: Normal              Keratometry     Keratometry (Automated)       K1 Axis K2 Axis    Right        Left 45.25 005 45.50 095                  Ophthalmology Exam     Wearing Rx       Sphere Cylinder Axis Add    Right +0.50 -0.25 066 +2.00    Left +0.25 ds  +2.00    Age: 6yrs    Type: Bifocal              Manifest Refraction     Manifest Refraction       Sphere Cylinder Axis Dist VA Add    Right +0.75 -0.25 075 20/20 +2.00    Left +0.25 ds  20/20 +2.00          Manifest Refraction #2 (Auto)       Sphere Cylinder Axis Dist VA Add    Right +1.00 -0.50 078      Left +0.25 0.00 000                 Final Rx       Sphere Cylinder Axis Add    Right +0.75 -0.25 075 +2.00    Left +0.25 ds  +2.00            1.  Hyperopia of both eyes with astigmatism and presbyopia           Patient Instructions   New glasses recommended      Return in about 2 years (around 2023) for complete eye exam.

## 2021-02-19 DIAGNOSIS — G89.29 CHRONIC BILATERAL LOW BACK PAIN WITHOUT SCIATICA: ICD-10-CM

## 2021-02-19 DIAGNOSIS — M54.50 CHRONIC BILATERAL LOW BACK PAIN WITHOUT SCIATICA: ICD-10-CM

## 2021-02-19 RX ORDER — HYDROCODONE BITARTRATE AND ACETAMINOPHEN 5; 325 MG/1; MG/1
1 TABLET ORAL 2 TIMES DAILY PRN
Qty: 60 TABLET | Refills: 0 | Status: SHIPPED | OUTPATIENT
Start: 2021-02-19 | End: 2021-03-22 | Stop reason: SDUPTHER

## 2021-02-19 NOTE — TELEPHONE ENCOUNTER
Renu Figueroa called requesting a refill of the below medication which has been pended for you: [facility] from [place] reviewed. NORCO 5-325 mg last filled 1/20/21 #60/30 days. Report available for your review. Requested Prescriptions     Pending Prescriptions Disp Refills    HYDROcodone-acetaminophen (NORCO) 5-325 MG per tablet 60 tablet 0     Sig: Take 1 tablet by mouth 2 times daily as needed for Pain for up to 30 days. Last Appointment Date: 11/17/2020  Next Appointment Date: Visit date not found    Allergies   Allergen Reactions    Requip [Ropinirole] Other (See Comments)     Hypotension and syncope.

## 2021-02-25 DIAGNOSIS — F41.9 ANXIETY: ICD-10-CM

## 2021-02-26 RX ORDER — ALPRAZOLAM 1 MG/1
1 TABLET ORAL 2 TIMES DAILY PRN
Qty: 60 TABLET | Refills: 0 | Status: SHIPPED | OUTPATIENT
Start: 2021-02-26 | End: 2021-03-26 | Stop reason: SDUPTHER

## 2021-02-26 NOTE — TELEPHONE ENCOUNTER
Tami Stapleton called requesting a refill of the below medication which has been pended for you: Per [facility] from [place] Alprazolam 1 mg last filled 1/26/21 #60/30 days. Report available for your review. Requested Prescriptions     Pending Prescriptions Disp Refills    ALPRAZolam (XANAX) 1 MG tablet 60 tablet 0     Sig: Take 1 tablet by mouth 2 times daily as needed for Anxiety for up to 30 days. Last Appointment Date: 11/17/2020  Next Appointment Date: Visit date not found    Allergies   Allergen Reactions    Requip [Ropinirole] Other (See Comments)     Hypotension and syncope.

## 2021-03-19 DIAGNOSIS — M54.50 CHRONIC BILATERAL LOW BACK PAIN WITHOUT SCIATICA: ICD-10-CM

## 2021-03-19 DIAGNOSIS — G89.29 CHRONIC BILATERAL LOW BACK PAIN WITHOUT SCIATICA: ICD-10-CM

## 2021-03-19 RX ORDER — HYDROCODONE BITARTRATE AND ACETAMINOPHEN 5; 325 MG/1; MG/1
1 TABLET ORAL 2 TIMES DAILY PRN
Qty: 60 TABLET | Refills: 0 | OUTPATIENT
Start: 2021-03-19 | End: 2021-04-18

## 2021-03-19 NOTE — TELEPHONE ENCOUNTER
This was denied by [facility] earlier due to fill date but fill date is 1-21, pt is out, can this be filled today.

## 2021-03-22 RX ORDER — HYDROCODONE BITARTRATE AND ACETAMINOPHEN 5; 325 MG/1; MG/1
1 TABLET ORAL 2 TIMES DAILY PRN
Qty: 60 TABLET | Refills: 0 | Status: SHIPPED | OUTPATIENT
Start: 2021-03-22 | End: 2021-04-22 | Stop reason: SDUPTHER

## 2021-03-23 ENCOUNTER — OFFICE VISIT (OUTPATIENT)
Dept: FAMILY MEDICINE CLINIC | Age: 56
End: 2021-03-23
Payer: COMMERCIAL

## 2021-03-23 VITALS
DIASTOLIC BLOOD PRESSURE: 68 MMHG | HEIGHT: 61 IN | BODY MASS INDEX: 29.45 KG/M2 | WEIGHT: 156 LBS | SYSTOLIC BLOOD PRESSURE: 118 MMHG | HEART RATE: 72 BPM

## 2021-03-23 DIAGNOSIS — M65.4 DE QUERVAIN'S TENOSYNOVITIS, LEFT: Primary | ICD-10-CM

## 2021-03-23 DIAGNOSIS — Z00.00 ENCOUNTER FOR PREVENTIVE CARE: ICD-10-CM

## 2021-03-23 PROCEDURE — 99213 OFFICE O/P EST LOW 20 MIN: CPT | Performed by: FAMILY MEDICINE

## 2021-03-23 PROCEDURE — L3908 WHO COCK-UP NONMOLDE PRE OTS: HCPCS | Performed by: FAMILY MEDICINE

## 2021-03-23 ASSESSMENT — PATIENT HEALTH QUESTIONNAIRE - PHQ9
1. LITTLE INTEREST OR PLEASURE IN DOING THINGS: 0
SUM OF ALL RESPONSES TO PHQ QUESTIONS 1-9: 0
SUM OF ALL RESPONSES TO PHQ9 QUESTIONS 1 & 2: 0

## 2021-03-23 ASSESSMENT — ENCOUNTER SYMPTOMS
ALLERGIC/IMMUNOLOGIC NEGATIVE: 1
GASTROINTESTINAL NEGATIVE: 1
BACK PAIN: 1
EYES NEGATIVE: 1
RESPIRATORY NEGATIVE: 1

## 2021-03-23 NOTE — PROGRESS NOTES
Subjective:      Patient ID: Antolin Azul is a 64 y.o. female. HPI Acute visit for pain in the left hand. Somewhat chronic over the last 6 months. She is right hand dominant. 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 No pain in the right hand. Pain progressing. Keeping her up at night. Mostly the thumb area. Pain radiating up the radial side of the wrist.  No prior issues like this. Past Medical History:   Diagnosis Date    Alcohol abuse     Carpal tunnel syndrome, bilateral     Chronic back pain     Depression with anxiety     HGSIL (high grade squamous intraepithelial lesion) on Pap smear     Insomnia     Migraine     Recreational drug use     Marijuana.  Tobacco abuse      Past Surgical History:   Procedure Laterality Date     SECTION      COLPOSCOPY  2002    With laser cone biopsy of the cervix.  TUBAL LIGATION       Current Outpatient Medications   Medication Sig Dispense Refill    HYDROcodone-acetaminophen (NORCO) 5-325 MG per tablet Take 1 tablet by mouth 2 times daily as needed for Pain for up to 30 days. 60 tablet 0    ALPRAZolam (XANAX) 1 MG tablet Take 1 tablet by mouth 2 times daily as needed for Anxiety for up to 30 days. 60 tablet 0    triamcinolone (KENALOG) 0.1 % cream Apply topically 2 times daily. 80 g 2    simvastatin (ZOCOR) 20 MG tablet Take 1 tablet by mouth nightly 90 tablet 1    aspirin 81 MG tablet Take 81 mg by mouth daily      clotrimazole-betamethasone (LOTRISONE) 1-0.05 % cream Apply topically 2 times daily. 45 g 1    fluocinonide-emollient (LIDEX-E) 0.05 % cream Apply topically 2 times daily. 60 g 1     No current facility-administered medications for this visit. Allergies   Allergen Reactions    Naproxen      Nausea/shaky feeling    Requip [Ropinirole] Other (See Comments)     Hypotension and syncope. Review of Systems   Constitutional: Negative. HENT: Negative. Eyes: Negative. Respiratory: Negative. Cardiovascular: Negative. Gastrointestinal: Negative. Endocrine: Negative. Genitourinary: Negative. Musculoskeletal: Positive for arthralgias (left hand) and back pain. Skin: Negative. Allergic/Immunologic: Negative. Neurological: Negative. Hematological: Negative. Psychiatric/Behavioral: Negative. Objective:   Physical Exam  Constitutional:       General: She is not in acute distress. Appearance: She is well-developed. HENT:      Head: Normocephalic and atraumatic. Right Ear: External ear normal.      Left Ear: External ear normal.      Mouth/Throat:      Pharynx: No oropharyngeal exudate. Eyes:      General: No scleral icterus. Conjunctiva/sclera: Conjunctivae normal.   Neck:      Musculoskeletal: Neck supple. Thyroid: No thyromegaly. Cardiovascular:      Rate and Rhythm: Normal rate and regular rhythm. Heart sounds: Normal heart sounds. No murmur. Pulmonary:      Effort: Pulmonary effort is normal. No respiratory distress. Breath sounds: Normal breath sounds. No wheezing. Abdominal:      General: Bowel sounds are normal. There is no distension. Palpations: Abdomen is soft. Tenderness: There is no abdominal tenderness. There is no rebound. Musculoskeletal: Normal range of motion. Right hand: She exhibits tenderness. She exhibits normal range of motion. Normal sensation noted. Normal strength noted. Hands:    Skin:     General: Skin is warm and dry. Findings: No erythema or rash. Neurological:      Mental Status: She is alert and oriented to person, place, and time. Psychiatric:         Behavior: Behavior normal.         Thought Content:  Thought content normal.         Judgment: Judgment normal.       /68 (Site: Right Upper Arm, Position: Sitting, Cuff Size: Large Adult)   Pulse 72   Ht 5' 1\" (1.549 m)   Wt 156 lb (70.8 kg)   LMP 08/01/2017 (Within Months)   BMI 29.48 kg/m²

## 2021-03-26 ENCOUNTER — HOSPITAL ENCOUNTER (OUTPATIENT)
Dept: LAB | Age: 56
Discharge: HOME | End: 2021-03-26
Payer: COMMERCIAL

## 2021-03-26 DIAGNOSIS — F41.9 ANXIETY: ICD-10-CM

## 2021-03-26 DIAGNOSIS — Z00.00 ENCOUNTER FOR PREVENTIVE CARE: ICD-10-CM

## 2021-03-26 LAB
ABSOLUTE EOS #: 0.3 K/UL (ref 0–0.44)
ABSOLUTE IMMATURE GRANULOCYTE: <0.03 K/UL (ref 0–0.3)
ABSOLUTE LYMPH #: 1.85 K/UL (ref 1.1–3.7)
ABSOLUTE MONO #: 0.61 K/UL (ref 0.1–1.2)
ANION GAP SERPL CALCULATED.3IONS-SCNC: 9 MMOL/L (ref 9–17)
BASOPHILS # BLD: 1 % (ref 0–2)
BASOPHILS ABSOLUTE: 0.03 K/UL (ref 0–0.2)
BUN BLDV-MCNC: 15 MG/DL (ref 6–20)
BUN/CREAT BLD: 21 (ref 9–20)
CALCIUM SERPL-MCNC: 9.4 MG/DL (ref 8.6–10.4)
CHLORIDE BLD-SCNC: 104 MMOL/L (ref 98–107)
CHOLESTEROL/HDL RATIO: 3.6
CHOLESTEROL: 206 MG/DL
CO2: 27 MMOL/L (ref 20–31)
CREAT SERPL-MCNC: 0.71 MG/DL (ref 0.5–0.9)
DIFFERENTIAL TYPE: ABNORMAL
EOSINOPHILS RELATIVE PERCENT: 5 % (ref 1–4)
GFR AFRICAN AMERICAN: >60 ML/MIN
GFR NON-AFRICAN AMERICAN: >60 ML/MIN
GFR SERPL CREATININE-BSD FRML MDRD: ABNORMAL ML/MIN/{1.73_M2}
GFR SERPL CREATININE-BSD FRML MDRD: ABNORMAL ML/MIN/{1.73_M2}
GLUCOSE BLD-MCNC: 103 MG/DL (ref 70–99)
HCT VFR BLD CALC: 45.7 % (ref 36.3–47.1)
HDLC SERPL-MCNC: 57 MG/DL
HEMOGLOBIN: 15.1 G/DL (ref 11.9–15.1)
IMMATURE GRANULOCYTES: 0 %
LDL CHOLESTEROL: 123 MG/DL (ref 0–130)
LYMPHOCYTES # BLD: 28 % (ref 24–43)
MCH RBC QN AUTO: 32.8 PG (ref 25.2–33.5)
MCHC RBC AUTO-ENTMCNC: 33 G/DL (ref 25.2–33.5)
MCV RBC AUTO: 99.1 FL (ref 82.6–102.9)
MONOCYTES # BLD: 9 % (ref 3–12)
NRBC AUTOMATED: 0 PER 100 WBC
PDW BLD-RTO: 12.4 % (ref 11.8–14.4)
PLATELET # BLD: 259 K/UL (ref 138–453)
PLATELET ESTIMATE: ABNORMAL
PMV BLD AUTO: 9.1 FL (ref 8.1–13.5)
POTASSIUM SERPL-SCNC: 4 MMOL/L (ref 3.7–5.3)
RBC # BLD: 4.61 M/UL (ref 3.95–5.11)
RBC # BLD: ABNORMAL 10*6/UL
SEG NEUTROPHILS: 57 % (ref 36–65)
SEGMENTED NEUTROPHILS ABSOLUTE COUNT: 3.71 K/UL (ref 1.5–8.1)
SODIUM BLD-SCNC: 140 MMOL/L (ref 135–144)
TRIGL SERPL-MCNC: 131 MG/DL
VLDLC SERPL CALC-MCNC: ABNORMAL MG/DL (ref 1–30)
WBC # BLD: 6.5 K/UL (ref 3.5–11.3)
WBC # BLD: ABNORMAL 10*3/UL

## 2021-03-26 PROCEDURE — 85025 COMPLETE CBC W/AUTO DIFF WBC: CPT

## 2021-03-26 PROCEDURE — 36415 COLL VENOUS BLD VENIPUNCTURE: CPT

## 2021-03-26 PROCEDURE — 80048 BASIC METABOLIC PNL TOTAL CA: CPT

## 2021-03-26 PROCEDURE — 80061 LIPID PANEL: CPT

## 2021-03-26 RX ORDER — ALPRAZOLAM 1 MG/1
1 TABLET ORAL 2 TIMES DAILY PRN
Qty: 60 TABLET | Refills: 0 | Status: SHIPPED | OUTPATIENT
Start: 2021-03-26 | End: 2021-05-27 | Stop reason: SDUPTHER

## 2021-04-05 ENCOUNTER — IMMUNIZATION (OUTPATIENT)
Dept: LAB | Age: 56
End: 2021-04-05
Payer: COMMERCIAL

## 2021-04-05 PROCEDURE — 0011A COVID-19, MODERNA VACCINE 100MCG/0.5ML DOSE: CPT | Performed by: INTERNAL MEDICINE

## 2021-04-05 PROCEDURE — 91301 COVID-19, MODERNA VACCINE 100MCG/0.5ML DOSE: CPT | Performed by: INTERNAL MEDICINE

## 2021-04-22 DIAGNOSIS — M54.50 CHRONIC BILATERAL LOW BACK PAIN WITHOUT SCIATICA: ICD-10-CM

## 2021-04-22 DIAGNOSIS — G89.29 CHRONIC BILATERAL LOW BACK PAIN WITHOUT SCIATICA: ICD-10-CM

## 2021-04-22 RX ORDER — HYDROCODONE BITARTRATE AND ACETAMINOPHEN 5; 325 MG/1; MG/1
1 TABLET ORAL 2 TIMES DAILY PRN
Qty: 60 TABLET | Refills: 0 | Status: SHIPPED | OUTPATIENT
Start: 2021-04-22 | End: 2021-05-21 | Stop reason: SDUPTHER

## 2021-04-22 RX ORDER — HYDROCODONE BITARTRATE AND ACETAMINOPHEN 5; 325 MG/1; MG/1
1 TABLET ORAL 2 TIMES DAILY PRN
Qty: 60 TABLET | Refills: 0 | Status: CANCELLED | OUTPATIENT
Start: 2021-04-22 | End: 2021-05-22

## 2021-04-23 DIAGNOSIS — M54.50 CHRONIC BILATERAL LOW BACK PAIN WITHOUT SCIATICA: ICD-10-CM

## 2021-04-23 DIAGNOSIS — G89.29 CHRONIC BILATERAL LOW BACK PAIN WITHOUT SCIATICA: ICD-10-CM

## 2021-04-23 RX ORDER — HYDROCODONE BITARTRATE AND ACETAMINOPHEN 5; 325 MG/1; MG/1
1 TABLET ORAL 2 TIMES DAILY PRN
Qty: 60 TABLET | Refills: 0 | Status: CANCELLED | OUTPATIENT
Start: 2021-04-23 | End: 2021-05-23

## 2021-04-27 DIAGNOSIS — M54.50 CHRONIC BILATERAL LOW BACK PAIN WITHOUT SCIATICA: ICD-10-CM

## 2021-04-27 DIAGNOSIS — G89.29 CHRONIC BILATERAL LOW BACK PAIN WITHOUT SCIATICA: ICD-10-CM

## 2021-04-27 RX ORDER — HYDROCODONE BITARTRATE AND ACETAMINOPHEN 5; 325 MG/1; MG/1
1 TABLET ORAL 2 TIMES DAILY PRN
Qty: 60 TABLET | Refills: 0 | OUTPATIENT
Start: 2021-04-27 | End: 2021-05-27

## 2021-04-29 RX ORDER — CLOTRIMAZOLE AND BETAMETHASONE DIPROPIONATE 10; .64 MG/G; MG/G
CREAM TOPICAL
Qty: 45 G | Refills: 1 | Status: SHIPPED | OUTPATIENT
Start: 2021-04-29 | End: 2022-05-31

## 2021-05-03 ENCOUNTER — IMMUNIZATION (OUTPATIENT)
Dept: LAB | Age: 56
End: 2021-05-03
Payer: COMMERCIAL

## 2021-05-03 PROCEDURE — 0012A COVID-19, MODERNA VACCINE 100MCG/0.5ML DOSE: CPT | Performed by: INTERNAL MEDICINE

## 2021-05-03 PROCEDURE — 91301 COVID-19, MODERNA VACCINE 100MCG/0.5ML DOSE: CPT | Performed by: INTERNAL MEDICINE

## 2021-05-21 DIAGNOSIS — M54.50 CHRONIC BILATERAL LOW BACK PAIN WITHOUT SCIATICA: ICD-10-CM

## 2021-05-21 DIAGNOSIS — G89.29 CHRONIC BILATERAL LOW BACK PAIN WITHOUT SCIATICA: ICD-10-CM

## 2021-05-22 RX ORDER — HYDROCODONE BITARTRATE AND ACETAMINOPHEN 5; 325 MG/1; MG/1
1 TABLET ORAL 2 TIMES DAILY PRN
Qty: 60 TABLET | Refills: 0 | Status: SHIPPED | OUTPATIENT
Start: 2021-05-22 | End: 2021-06-21 | Stop reason: SDUPTHER

## 2021-05-22 NOTE — TELEPHONE ENCOUNTER
Daphnie Monday called requesting a refill of the below medication which has been pended for you:     [facility] report shows last fill on 4/23/21 for 60 tablets    Requested Prescriptions     Pending Prescriptions Disp Refills    HYDROcodone-acetaminophen (NORCO) 5-325 MG per tablet 60 tablet 0     Sig: Take 1 tablet by mouth 2 times daily as needed for Pain for up to 30 days. Last Appointment Date: 3/23/2021  Next Appointment Date: Visit date not found    Allergies   Allergen Reactions    Naproxen      Nausea/shaky feeling    Requip [Ropinirole] Other (See Comments)     Hypotension and syncope.

## 2021-05-27 DIAGNOSIS — F41.9 ANXIETY: ICD-10-CM

## 2021-05-27 RX ORDER — ALPRAZOLAM 1 MG/1
1 TABLET ORAL 2 TIMES DAILY PRN
Qty: 60 TABLET | Refills: 0 | Status: SHIPPED | OUTPATIENT
Start: 2021-05-27 | End: 2021-06-25 | Stop reason: SDUPTHER

## 2021-05-27 NOTE — TELEPHONE ENCOUNTER
Donnamae Bergeron called requesting a refill of the below medication which has been pended for you:     [employer] [facility] last fill was on 4/28/21 for a quantity of 60 tablets      Requested Prescriptions     Pending Prescriptions Disp Refills    ALPRAZolam (XANAX) 1 MG tablet 60 tablet 0     Sig: Take 1 tablet by mouth 2 times daily as needed for Anxiety for up to 30 days. Last Appointment Date: 3/23/2021  Next Appointment Date: Visit date not found    Allergies   Allergen Reactions    Naproxen      Nausea/shaky feeling    Requip [Ropinirole] Other (See Comments)     Hypotension and syncope.

## 2021-05-28 DIAGNOSIS — F41.9 ANXIETY: ICD-10-CM

## 2021-05-28 RX ORDER — ALPRAZOLAM 1 MG/1
1 TABLET ORAL 2 TIMES DAILY PRN
Qty: 60 TABLET | Refills: 0 | Status: CANCELLED | OUTPATIENT
Start: 2021-05-28 | End: 2021-06-27

## 2021-05-28 NOTE — TELEPHONE ENCOUNTER
Adeola Figueroa called requesting a refill of the below medication which has been pended for you: script was completed yesterday. Requested Prescriptions     Pending Prescriptions Disp Refills    ALPRAZolam (XANAX) 1 MG tablet 60 tablet 0     Sig: Take 1 tablet by mouth 2 times daily as needed for Anxiety for up to 30 days. Last Appointment Date: 3/23/2021  Next Appointment Date: Visit date not found    Allergies   Allergen Reactions    Naproxen      Nausea/shaky feeling    Requip [Ropinirole] Other (See Comments)     Hypotension and syncope.

## 2021-06-21 DIAGNOSIS — G89.29 CHRONIC BILATERAL LOW BACK PAIN WITHOUT SCIATICA: ICD-10-CM

## 2021-06-21 DIAGNOSIS — M54.50 CHRONIC BILATERAL LOW BACK PAIN WITHOUT SCIATICA: ICD-10-CM

## 2021-06-21 RX ORDER — HYDROCODONE BITARTRATE AND ACETAMINOPHEN 5; 325 MG/1; MG/1
1 TABLET ORAL 2 TIMES DAILY PRN
Qty: 60 TABLET | Refills: 0 | Status: SHIPPED | OUTPATIENT
Start: 2021-06-21 | End: 2021-07-21 | Stop reason: SDUPTHER

## 2021-06-22 RX ORDER — HYDROCODONE BITARTRATE AND ACETAMINOPHEN 5; 325 MG/1; MG/1
1 TABLET ORAL 2 TIMES DAILY PRN
Qty: 60 TABLET | Refills: 0 | Status: SHIPPED | OUTPATIENT
Start: 2021-06-22 | End: 2021-07-21 | Stop reason: SDUPTHER

## 2021-06-28 DIAGNOSIS — F41.9 ANXIETY: ICD-10-CM

## 2021-07-09 RX ORDER — ALPRAZOLAM 1 MG/1
1 TABLET ORAL 2 TIMES DAILY PRN
Qty: 60 TABLET | Refills: 0 | Status: SHIPPED | OUTPATIENT
Start: 2021-07-09 | End: 2021-08-27 | Stop reason: SDUPTHER

## 2021-07-21 DIAGNOSIS — G89.29 CHRONIC BILATERAL LOW BACK PAIN WITHOUT SCIATICA: ICD-10-CM

## 2021-07-21 DIAGNOSIS — M54.50 CHRONIC BILATERAL LOW BACK PAIN WITHOUT SCIATICA: ICD-10-CM

## 2021-07-21 RX ORDER — HYDROCODONE BITARTRATE AND ACETAMINOPHEN 5; 325 MG/1; MG/1
1 TABLET ORAL 2 TIMES DAILY PRN
Qty: 30 TABLET | Refills: 0 | Status: SHIPPED | OUTPATIENT
Start: 2021-07-21 | End: 2021-08-02 | Stop reason: SDUPTHER

## 2021-07-21 NOTE — TELEPHONE ENCOUNTER
Doc Shirts called requesting a refill of the below medication which has been pended for you: [place] from [place] reviewed. NORCO 5-325 mg last filled 6/21/21 #60/30 days. Requested Prescriptions     Pending Prescriptions Disp Refills    HYDROcodone-acetaminophen (NORCO) 5-325 MG per tablet 60 tablet 0     Sig: Take 1 tablet by mouth 2 times daily as needed for Pain for up to 30 days. Last Appointment Date: 3/23/2021  Next Appointment Date:  for patient to return call- due for appointment. Allergies   Allergen Reactions    Naproxen      Nausea/shaky feeling    Requip [Ropinirole] Other (See Comments)     Hypotension and syncope.

## 2021-07-23 ENCOUNTER — TELEPHONE (OUTPATIENT)
Dept: FAMILY MEDICINE CLINIC | Age: 56
End: 2021-07-23

## 2021-07-23 NOTE — TELEPHONE ENCOUNTER
Patient called stating she received her refill for her Norco, but she normally receives a 30 day supply (60 tablets), but this time she only received a 15 day supply (30 tablets). She would like a call back regarding this.

## 2021-07-23 NOTE — TELEPHONE ENCOUNTER
Advised patient I assume a two week supply was only given until Dr Nick Purcell returns since it is a controlled substance. Patient states understanding.

## 2021-07-26 DIAGNOSIS — F41.9 ANXIETY: ICD-10-CM

## 2021-07-26 RX ORDER — ALPRAZOLAM 1 MG/1
1 TABLET ORAL 2 TIMES DAILY PRN
Qty: 60 TABLET | Refills: 0 | OUTPATIENT
Start: 2021-07-26 | End: 2021-08-25

## 2021-07-26 NOTE — TELEPHONE ENCOUNTER
Ewelina Cueto called requesting a refill of the below medication which has been pended for you: [place] from [place]. Alprazolam 1 mg last filled 6/25/21 #60/30 days. Report available for your review. Last refill was from 7/9/21- I called and spoke to the pharmacy, and the refill is still there for the patient to . I spoke to patient and she will  prescription. Refill refused    GO out of office. Requested Prescriptions     Pending Prescriptions Disp Refills    ALPRAZolam (XANAX) 1 MG tablet 60 tablet 0     Sig: Take 1 tablet by mouth 2 times daily as needed for Anxiety for up to 30 days. Last Appointment Date: 3/23/2021  Next Appointment Date: 9/3/2021    Allergies   Allergen Reactions    Naproxen      Nausea/shaky feeling    Requip [Ropinirole] Other (See Comments)     Hypotension and syncope.

## 2021-08-02 DIAGNOSIS — G89.29 CHRONIC BILATERAL LOW BACK PAIN WITHOUT SCIATICA: ICD-10-CM

## 2021-08-02 DIAGNOSIS — M54.50 CHRONIC BILATERAL LOW BACK PAIN WITHOUT SCIATICA: ICD-10-CM

## 2021-08-02 NOTE — TELEPHONE ENCOUNTER
Cathi Mcardle called requesting a refill of the below medication which has been pended for you:     Requested Prescriptions     Pending Prescriptions Disp Refills    HYDROcodone-acetaminophen (NORCO) 5-325 MG per tablet 30 tablet 0     Sig: Take 1 tablet by mouth 2 times daily as needed for Pain for up to 30 days. Last Appointment Date: Visit date not found  Next Appointment Date: Visit date not found    Allergies   Allergen Reactions    Naproxen      Nausea/shaky feeling    Requip [Ropinirole] Other (See Comments)     Hypotension and syncope.

## 2021-08-03 ENCOUNTER — TELEPHONE (OUTPATIENT)
Dept: FAMILY MEDICINE CLINIC | Age: 56
End: 2021-08-03

## 2021-08-03 DIAGNOSIS — M54.50 CHRONIC BILATERAL LOW BACK PAIN WITHOUT SCIATICA: ICD-10-CM

## 2021-08-03 DIAGNOSIS — G89.29 CHRONIC BILATERAL LOW BACK PAIN WITHOUT SCIATICA: ICD-10-CM

## 2021-08-03 RX ORDER — HYDROCODONE BITARTRATE AND ACETAMINOPHEN 5; 325 MG/1; MG/1
1 TABLET ORAL 2 TIMES DAILY PRN
Qty: 30 TABLET | Refills: 0 | Status: SHIPPED | OUTPATIENT
Start: 2021-08-03 | End: 2021-08-21 | Stop reason: SDUPTHER

## 2021-08-03 NOTE — TELEPHONE ENCOUNTER
----- Message from [place], [place] sent at 8/3/2021  9:46 AM EDT -----  Subject: Refill Request    QUESTIONS  Name of Medication? HYDROcodone-acetaminophen (NORCO) 5-325 MG per tablet  Patient-reported dosage and instructions? 5-325 mg  How many days do you have left? 0  Preferred Pharmacy? [id number] phone number (if available)? [phone]  Additional Information for Provider? Pt requesting the other \"15\" to her   30 day script. PCP was out and she was only given 15qty. Please follow up.  ---------------------------------------------------------------------------  --------------  CALL BACK INFO  What is the best way for the office to contact you? OK to leave message on   voicemail  Preferred Call Back Phone Number?  [phone]

## 2021-08-05 RX ORDER — HYDROCODONE BITARTRATE AND ACETAMINOPHEN 5; 325 MG/1; MG/1
1 TABLET ORAL 2 TIMES DAILY PRN
Qty: 30 TABLET | Refills: 0 | OUTPATIENT
Start: 2021-08-05 | End: 2021-09-04

## 2021-08-21 DIAGNOSIS — G89.29 CHRONIC BILATERAL LOW BACK PAIN WITHOUT SCIATICA: ICD-10-CM

## 2021-08-21 DIAGNOSIS — M54.50 CHRONIC BILATERAL LOW BACK PAIN WITHOUT SCIATICA: ICD-10-CM

## 2021-08-23 RX ORDER — HYDROCODONE BITARTRATE AND ACETAMINOPHEN 5; 325 MG/1; MG/1
1 TABLET ORAL 2 TIMES DAILY PRN
Qty: 30 TABLET | Refills: 0 | Status: SHIPPED | OUTPATIENT
Start: 2021-08-23 | End: 2021-09-07 | Stop reason: DRUGHIGH

## 2021-08-23 NOTE — TELEPHONE ENCOUNTER
Eh Arzate called requesting a refill of the below medication which has been pended for you:     Requested Prescriptions     Pending Prescriptions Disp Refills    HYDROcodone-acetaminophen (NORCO) 5-325 MG per tablet 30 tablet 0     Sig: Take 1 tablet by mouth 2 times daily as needed for Pain for up to 30 days. Last Appointment Date: 3/23/2021  Next Appointment Date: 9/7/2021    Allergies   Allergen Reactions    Naproxen      Nausea/shaky feeling    Requip [Ropinirole] Other (See Comments)     Hypotension and syncope.

## 2021-08-27 DIAGNOSIS — F41.9 ANXIETY: ICD-10-CM

## 2021-08-27 RX ORDER — ALPRAZOLAM 1 MG/1
1 TABLET ORAL 2 TIMES DAILY PRN
Qty: 60 TABLET | Refills: 0 | Status: SHIPPED | OUTPATIENT
Start: 2021-08-27 | End: 2021-09-26 | Stop reason: SDUPTHER

## 2021-09-07 ENCOUNTER — OFFICE VISIT (OUTPATIENT)
Dept: FAMILY MEDICINE CLINIC | Age: 56
End: 2021-09-07
Payer: COMMERCIAL

## 2021-09-07 VITALS
HEART RATE: 68 BPM | WEIGHT: 155 LBS | DIASTOLIC BLOOD PRESSURE: 68 MMHG | BODY MASS INDEX: 29.27 KG/M2 | HEIGHT: 61 IN | SYSTOLIC BLOOD PRESSURE: 118 MMHG

## 2021-09-07 DIAGNOSIS — M18.0 OSTEOARTHRITIS OF BOTH THUMBS: Primary | ICD-10-CM

## 2021-09-07 DIAGNOSIS — G47.00 INSOMNIA, UNSPECIFIED TYPE: ICD-10-CM

## 2021-09-07 DIAGNOSIS — G89.29 CHRONIC BILATERAL LOW BACK PAIN WITHOUT SCIATICA: ICD-10-CM

## 2021-09-07 DIAGNOSIS — M54.50 CHRONIC BILATERAL LOW BACK PAIN WITHOUT SCIATICA: ICD-10-CM

## 2021-09-07 PROCEDURE — 99214 OFFICE O/P EST MOD 30 MIN: CPT | Performed by: FAMILY MEDICINE

## 2021-09-07 RX ORDER — TRIAMCINOLONE ACETONIDE 1 MG/G
CREAM TOPICAL
Qty: 80 G | Refills: 2 | Status: SHIPPED | OUTPATIENT
Start: 2021-09-07 | End: 2021-09-24 | Stop reason: SDUPTHER

## 2021-09-07 RX ORDER — HYDROCODONE BITARTRATE AND ACETAMINOPHEN 5; 325 MG/1; MG/1
1 TABLET ORAL 2 TIMES DAILY PRN
Qty: 30 TABLET | Refills: 0 | Status: SHIPPED
Start: 2021-09-07 | End: 2021-09-21 | Stop reason: SDUPTHER

## 2021-09-07 RX ORDER — MELOXICAM 15 MG/1
15 TABLET ORAL DAILY
Qty: 30 TABLET | Refills: 3 | Status: SHIPPED | OUTPATIENT
Start: 2021-09-07 | End: 2021-11-18 | Stop reason: SDUPTHER

## 2021-09-07 RX ORDER — SIMVASTATIN 20 MG
20 TABLET ORAL NIGHTLY
Qty: 90 TABLET | Refills: 1 | Status: SHIPPED | OUTPATIENT
Start: 2021-09-07 | End: 2022-02-01 | Stop reason: SDUPTHER

## 2021-09-07 RX ORDER — TRAZODONE HYDROCHLORIDE 50 MG/1
50 TABLET ORAL NIGHTLY
Qty: 30 TABLET | Refills: 11 | Status: SHIPPED | OUTPATIENT
Start: 2021-09-07 | End: 2022-01-12 | Stop reason: SDUPTHER

## 2021-09-07 ASSESSMENT — PATIENT HEALTH QUESTIONNAIRE - PHQ9
SUM OF ALL RESPONSES TO PHQ9 QUESTIONS 1 & 2: 0
SUM OF ALL RESPONSES TO PHQ QUESTIONS 1-9: 0
2. FEELING DOWN, DEPRESSED OR HOPELESS: 0
SUM OF ALL RESPONSES TO PHQ QUESTIONS 1-9: 0
1. LITTLE INTEREST OR PLEASURE IN DOING THINGS: 0
SUM OF ALL RESPONSES TO PHQ QUESTIONS 1-9: 0

## 2021-09-07 ASSESSMENT — ENCOUNTER SYMPTOMS
RESPIRATORY NEGATIVE: 1
ALLERGIC/IMMUNOLOGIC NEGATIVE: 1
GASTROINTESTINAL NEGATIVE: 1
EYES NEGATIVE: 1

## 2021-09-07 NOTE — PROGRESS NOTES
Subjective:      Patient ID: Yuly Santamaria is a 64 y.o. female. HPI acute visit for chronic finger and hand pain and insomnia. 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 Tenosynovitis in the past.  Currently the thumbs are the worst.  Left >right. Using cock up splints and norco.  She has tried biofreeze and other topicals without relief. Having issues with insomnia. She moved in with her son Kwaku bertrand. Mind wandering to various things, some 20 years ago. Xanax helps at times. Trouble getting to sleep and staying asleep. Not sure if it is the change in location/housing. Dog sick at present. Not really having more then the normal stressors. Mild anhedonia. Working 2nd shift at the [employer] in [facility]. Past Medical History:   Diagnosis Date    Alcohol abuse     Carpal tunnel syndrome, bilateral     Chronic back pain     Depression with anxiety     HGSIL (high grade squamous intraepithelial lesion) on Pap smear     Insomnia     Migraine     Recreational drug use     Marijuana.  Tobacco abuse      Past Surgical History:   Procedure Laterality Date     SECTION      COLPOSCOPY  2002    With laser cone biopsy of the cervix.  TUBAL LIGATION       Current Outpatient Medications   Medication Sig Dispense Refill    ALPRAZolam (XANAX) 1 MG tablet Take 1 tablet by mouth 2 times daily as needed for Anxiety for up to 30 days. 60 tablet 0    HYDROcodone-acetaminophen (NORCO) 5-325 MG per tablet Take 1 tablet by mouth 2 times daily as needed for Pain for up to 30 days. 30 tablet 0    clotrimazole-betamethasone (LOTRISONE) 1-0.05 % cream Apply topically 2 times daily. 45 g 1    triamcinolone (KENALOG) 0.1 % cream Apply topically 2 times daily. 80 g 2    simvastatin (ZOCOR) 20 MG tablet Take 1 tablet by mouth nightly 90 tablet 1    fluocinonide-emollient (LIDEX-E) 0.05 % cream Apply topically 2 times daily.  60 g 1    aspirin 81 MG tablet Take 81 mg by mouth daily       No current facility-administered medications for this visit. Review of Systems   Constitutional: Negative. HENT: Negative. Eyes: Negative. Respiratory: Negative. Cardiovascular: Negative. Gastrointestinal: Negative. Endocrine: Negative. Genitourinary: Negative. Musculoskeletal: Positive for arthralgias. Skin: Negative. Allergic/Immunologic: Negative. Neurological: Negative. Hematological: Negative. Psychiatric/Behavioral: Positive for sleep disturbance. Negative for behavioral problems, self-injury and suicidal ideas. The patient is nervous/anxious. Objective:   Physical Exam  Constitutional:       General: She is not in acute distress. Appearance: She is well-developed. HENT:      Head: Normocephalic and atraumatic. Right Ear: External ear normal.      Left Ear: External ear normal.      Mouth/Throat:      Pharynx: No oropharyngeal exudate. Eyes:      General: No scleral icterus. Conjunctiva/sclera: Conjunctivae normal.   Neck:      Thyroid: No thyromegaly. Cardiovascular:      Rate and Rhythm: Normal rate and regular rhythm. Heart sounds: Normal heart sounds. No murmur heard. Pulmonary:      Effort: Pulmonary effort is normal. No respiratory distress. Breath sounds: Normal breath sounds. No wheezing. Abdominal:      General: Bowel sounds are normal. There is no distension. Palpations: Abdomen is soft. Tenderness: There is no abdominal tenderness. There is no rebound. Musculoskeletal:         General: Normal range of motion. Right hand: Tenderness (all joints of the thumb, basal joint the worst) and bony tenderness present. Decreased strength of finger abduction. Left hand: Tenderness and bony tenderness present. Decreased strength of finger abduction (cant open shampoo at times). Cervical back: Neck supple. Skin:     General: Skin is warm and dry. Findings: No erythema or rash. Neurological:      Mental Status: She is alert and oriented to person, place, and time. Psychiatric:         Behavior: Behavior normal.         Thought Content: Thought content normal.         Judgment: Judgment normal.       /68 (Site: Right Upper Arm, Position: Sitting, Cuff Size: Large Adult)   Pulse 68   Ht 5' 1\" (1.549 m)   Wt 155 lb (70.3 kg)   LMP 08/01/2017 (Within Months)   BMI 29.29 kg/m²     Assessment:      Encounter Diagnoses   Name Primary?  Osteoarthritis of both thumbs Yes    Insomnia, unspecified type            Plan:      Repetitive use of the hands at work leading to arthralgias in both hands. Mostly the thumbs at present. Topicals and braces have not stopped the pain/progression. Adding mobic 15mg/day    Insomnia: trouble staying asleep and getting to sleep. Trial of trazodone 50 to 100 mg hs.               Malachy Krabbe, MD

## 2021-09-17 DIAGNOSIS — M54.50 CHRONIC BILATERAL LOW BACK PAIN WITHOUT SCIATICA: ICD-10-CM

## 2021-09-17 DIAGNOSIS — G89.29 CHRONIC BILATERAL LOW BACK PAIN WITHOUT SCIATICA: ICD-10-CM

## 2021-09-17 RX ORDER — HYDROCODONE BITARTRATE AND ACETAMINOPHEN 5; 325 MG/1; MG/1
1 TABLET ORAL 2 TIMES DAILY PRN
Qty: 30 TABLET | Refills: 0 | OUTPATIENT
Start: 2021-09-17 | End: 2021-11-16

## 2021-09-21 DIAGNOSIS — G89.29 CHRONIC BILATERAL LOW BACK PAIN WITHOUT SCIATICA: ICD-10-CM

## 2021-09-21 DIAGNOSIS — M54.50 CHRONIC BILATERAL LOW BACK PAIN WITHOUT SCIATICA: ICD-10-CM

## 2021-09-21 NOTE — TELEPHONE ENCOUNTER
Jimena Argueta called requesting a refill of the below medication which has been pended for you: [place]. NORCO 5-325 mg last filled 8/23/21 #30/30 days. Report available for your review. Requested Prescriptions     Pending Prescriptions Disp Refills    HYDROcodone-acetaminophen (NORCO) 5-325 MG per tablet 30 tablet 0     Sig: Take 1 tablet by mouth 2 times daily as needed for Pain for up to 60 days. Last Appointment Date: 9/7/2021  Next Appointment Date: Visit date not found    Allergies   Allergen Reactions    Naproxen      Nausea/shaky feeling    Requip [Ropinirole] Other (See Comments)     Hypotension and syncope.

## 2021-09-22 DIAGNOSIS — M54.50 CHRONIC BILATERAL LOW BACK PAIN WITHOUT SCIATICA: ICD-10-CM

## 2021-09-22 DIAGNOSIS — G89.29 CHRONIC BILATERAL LOW BACK PAIN WITHOUT SCIATICA: ICD-10-CM

## 2021-09-22 RX ORDER — HYDROCODONE BITARTRATE AND ACETAMINOPHEN 5; 325 MG/1; MG/1
1 TABLET ORAL 2 TIMES DAILY PRN
Qty: 30 TABLET | Refills: 0 | Status: SHIPPED | OUTPATIENT
Start: 2021-09-22 | End: 2021-10-06 | Stop reason: SDUPTHER

## 2021-09-22 RX ORDER — HYDROCODONE BITARTRATE AND ACETAMINOPHEN 5; 325 MG/1; MG/1
1 TABLET ORAL 2 TIMES DAILY PRN
Qty: 30 TABLET | Refills: 0 | OUTPATIENT
Start: 2021-09-22 | End: 2021-11-21

## 2021-09-26 DIAGNOSIS — F41.9 ANXIETY: ICD-10-CM

## 2021-09-27 RX ORDER — ALPRAZOLAM 1 MG/1
1 TABLET ORAL 2 TIMES DAILY PRN
Qty: 60 TABLET | Refills: 0 | Status: SHIPPED | OUTPATIENT
Start: 2021-09-27 | End: 2021-10-26 | Stop reason: SDUPTHER

## 2021-09-27 RX ORDER — TRIAMCINOLONE ACETONIDE 1 MG/G
CREAM TOPICAL
Qty: 80 G | Refills: 2 | Status: SHIPPED | OUTPATIENT
Start: 2021-09-27 | End: 2022-03-14 | Stop reason: SDUPTHER

## 2021-09-27 NOTE — TELEPHONE ENCOUNTER
Bharat Way called requesting a refill of the below medication which has been pended for you:     Requested Prescriptions     Pending Prescriptions Disp Refills    triamcinolone (KENALOG) 0.1 % cream 80 g 2     Sig: Apply topically 2 times daily. Last Appointment Date: 9/7/2021  Next Appointment Date: 9/26/2021    Allergies   Allergen Reactions    Naproxen      Nausea/shaky feeling    Requip [Ropinirole] Other (See Comments)     Hypotension and syncope.

## 2021-09-27 NOTE — TELEPHONE ENCOUNTER
Jonah Edmondson called requesting a refill of the below medication which has been pended for you: [place]. Alprazolam 1 mg last filled #60/30 days. Report available for your review. Requested Prescriptions     Pending Prescriptions Disp Refills    ALPRAZolam (XANAX) 1 MG tablet 60 tablet 0     Sig: Take 1 tablet by mouth 2 times daily as needed for Anxiety for up to 30 days. Last Appointment Date: 9/7/2021  Next Appointment Date: Visit date not found    Allergies   Allergen Reactions    Naproxen      Nausea/shaky feeling    Requip [Ropinirole] Other (See Comments)     Hypotension and syncope.

## 2021-10-06 DIAGNOSIS — M54.50 CHRONIC BILATERAL LOW BACK PAIN WITHOUT SCIATICA: ICD-10-CM

## 2021-10-06 DIAGNOSIS — G89.29 CHRONIC BILATERAL LOW BACK PAIN WITHOUT SCIATICA: ICD-10-CM

## 2021-10-06 RX ORDER — HYDROCODONE BITARTRATE AND ACETAMINOPHEN 5; 325 MG/1; MG/1
1 TABLET ORAL 2 TIMES DAILY PRN
Qty: 30 TABLET | Refills: 0 | Status: SHIPPED | OUTPATIENT
Start: 2021-10-06 | End: 2021-10-20 | Stop reason: SDUPTHER

## 2021-10-07 ENCOUNTER — TELEPHONE (OUTPATIENT)
Dept: FAMILY MEDICINE CLINIC | Age: 56
End: 2021-10-07

## 2021-10-20 DIAGNOSIS — G89.29 CHRONIC BILATERAL LOW BACK PAIN WITHOUT SCIATICA: ICD-10-CM

## 2021-10-20 DIAGNOSIS — M54.50 CHRONIC BILATERAL LOW BACK PAIN WITHOUT SCIATICA: ICD-10-CM

## 2021-10-21 DIAGNOSIS — M54.50 CHRONIC BILATERAL LOW BACK PAIN WITHOUT SCIATICA: ICD-10-CM

## 2021-10-21 DIAGNOSIS — G89.29 CHRONIC BILATERAL LOW BACK PAIN WITHOUT SCIATICA: ICD-10-CM

## 2021-10-21 RX ORDER — HYDROCODONE BITARTRATE AND ACETAMINOPHEN 5; 325 MG/1; MG/1
1 TABLET ORAL 2 TIMES DAILY PRN
Qty: 60 TABLET | Refills: 0 | Status: SHIPPED | OUTPATIENT
Start: 2021-10-21 | End: 2021-11-18 | Stop reason: SDUPTHER

## 2021-10-25 RX ORDER — HYDROCODONE BITARTRATE AND ACETAMINOPHEN 5; 325 MG/1; MG/1
1 TABLET ORAL 2 TIMES DAILY PRN
Qty: 30 TABLET | Refills: 0 | OUTPATIENT
Start: 2021-10-25 | End: 2021-12-24

## 2021-10-26 DIAGNOSIS — F41.9 ANXIETY: ICD-10-CM

## 2021-10-26 RX ORDER — ALPRAZOLAM 1 MG/1
1 TABLET ORAL 2 TIMES DAILY PRN
Qty: 60 TABLET | Refills: 0 | Status: SHIPPED | OUTPATIENT
Start: 2021-10-26 | End: 2021-11-23 | Stop reason: SDUPTHER

## 2021-10-26 NOTE — TELEPHONE ENCOUNTER
OARRS reviewed   Last fill 09/27/21  #60/30days   Last OV-09/07/2021  No upcoming appointment scheduled

## 2021-11-18 DIAGNOSIS — G89.29 CHRONIC BILATERAL LOW BACK PAIN WITHOUT SCIATICA: ICD-10-CM

## 2021-11-18 DIAGNOSIS — M54.50 CHRONIC BILATERAL LOW BACK PAIN WITHOUT SCIATICA: ICD-10-CM

## 2021-11-18 RX ORDER — MELOXICAM 15 MG/1
15 TABLET ORAL DAILY
Qty: 30 TABLET | Refills: 3 | Status: SHIPPED | OUTPATIENT
Start: 2021-11-18 | End: 2022-02-01 | Stop reason: SDUPTHER

## 2021-11-18 RX ORDER — HYDROCODONE BITARTRATE AND ACETAMINOPHEN 5; 325 MG/1; MG/1
1 TABLET ORAL 2 TIMES DAILY PRN
Qty: 60 TABLET | Refills: 0 | Status: SHIPPED | OUTPATIENT
Start: 2021-11-18 | End: 2021-12-17 | Stop reason: SDUPTHER

## 2021-11-23 DIAGNOSIS — F41.9 ANXIETY: ICD-10-CM

## 2021-11-23 RX ORDER — ALPRAZOLAM 1 MG/1
1 TABLET ORAL 2 TIMES DAILY PRN
Qty: 60 TABLET | Refills: 0 | Status: SHIPPED | OUTPATIENT
Start: 2021-11-23 | End: 2021-12-23 | Stop reason: SDUPTHER

## 2022-01-12 RX ORDER — TRAZODONE HYDROCHLORIDE 50 MG/1
50 TABLET ORAL NIGHTLY
Qty: 30 TABLET | Refills: 11 | Status: SHIPPED | OUTPATIENT
Start: 2022-01-12 | End: 2022-10-18 | Stop reason: SDUPTHER

## 2022-01-18 ENCOUNTER — PATIENT MESSAGE (OUTPATIENT)
Dept: FAMILY MEDICINE CLINIC | Age: 57
End: 2022-01-18

## 2022-01-18 DIAGNOSIS — M54.50 CHRONIC BILATERAL LOW BACK PAIN WITHOUT SCIATICA: ICD-10-CM

## 2022-01-18 DIAGNOSIS — G89.29 CHRONIC BILATERAL LOW BACK PAIN WITHOUT SCIATICA: ICD-10-CM

## 2022-01-18 RX ORDER — HYDROCODONE BITARTRATE AND ACETAMINOPHEN 5; 325 MG/1; MG/1
1 TABLET ORAL 2 TIMES DAILY PRN
Qty: 60 TABLET | Refills: 0 | Status: SHIPPED | OUTPATIENT
Start: 2022-01-18 | End: 2022-02-17 | Stop reason: SDUPTHER

## 2022-01-21 DIAGNOSIS — F41.9 ANXIETY: ICD-10-CM

## 2022-01-23 DIAGNOSIS — F41.9 ANXIETY: ICD-10-CM

## 2022-01-24 RX ORDER — ALPRAZOLAM 1 MG/1
TABLET ORAL
Qty: 60 TABLET | Refills: 0 | Status: SHIPPED | OUTPATIENT
Start: 2022-01-24 | End: 2022-02-22 | Stop reason: SDUPTHER

## 2022-01-26 RX ORDER — ALPRAZOLAM 1 MG/1
1 TABLET ORAL 2 TIMES DAILY PRN
Qty: 60 TABLET | Refills: 0 | OUTPATIENT
Start: 2022-01-26 | End: 2022-02-25

## 2022-02-01 ENCOUNTER — OFFICE VISIT (OUTPATIENT)
Dept: FAMILY MEDICINE CLINIC | Age: 57
End: 2022-02-01
Payer: COMMERCIAL

## 2022-02-01 VITALS
OXYGEN SATURATION: 98 % | BODY MASS INDEX: 29.07 KG/M2 | TEMPERATURE: 97.8 F | HEART RATE: 80 BPM | HEIGHT: 61 IN | SYSTOLIC BLOOD PRESSURE: 130 MMHG | DIASTOLIC BLOOD PRESSURE: 72 MMHG | WEIGHT: 154 LBS

## 2022-02-01 DIAGNOSIS — H66.001 NON-RECURRENT ACUTE SUPPURATIVE OTITIS MEDIA OF RIGHT EAR WITHOUT SPONTANEOUS RUPTURE OF TYMPANIC MEMBRANE: Primary | ICD-10-CM

## 2022-02-01 PROCEDURE — 99213 OFFICE O/P EST LOW 20 MIN: CPT | Performed by: FAMILY MEDICINE

## 2022-02-01 RX ORDER — SIMVASTATIN 20 MG
20 TABLET ORAL NIGHTLY
Qty: 90 TABLET | Refills: 1 | Status: SHIPPED | OUTPATIENT
Start: 2022-02-01

## 2022-02-01 RX ORDER — MELOXICAM 15 MG/1
15 TABLET ORAL DAILY
Qty: 90 TABLET | Refills: 1 | Status: SHIPPED | OUTPATIENT
Start: 2022-02-01 | End: 2022-10-29 | Stop reason: ALTCHOICE

## 2022-02-01 RX ORDER — AMOXICILLIN 875 MG/1
875 TABLET, COATED ORAL 2 TIMES DAILY
Qty: 20 TABLET | Refills: 0 | Status: SHIPPED | OUTPATIENT
Start: 2022-02-01 | End: 2022-02-11

## 2022-02-01 ASSESSMENT — ENCOUNTER SYMPTOMS
EYES NEGATIVE: 1
RHINORRHEA: 0
ALLERGIC/IMMUNOLOGIC NEGATIVE: 1
GASTROINTESTINAL NEGATIVE: 1
RESPIRATORY NEGATIVE: 1
VOICE CHANGE: 0
SINUS PRESSURE: 0
COUGH: 0
SORE THROAT: 0

## 2022-02-01 ASSESSMENT — PATIENT HEALTH QUESTIONNAIRE - PHQ9
SUM OF ALL RESPONSES TO PHQ QUESTIONS 1-9: 0
SUM OF ALL RESPONSES TO PHQ QUESTIONS 1-9: 0
SUM OF ALL RESPONSES TO PHQ9 QUESTIONS 1 & 2: 0
1. LITTLE INTEREST OR PLEASURE IN DOING THINGS: 0
SUM OF ALL RESPONSES TO PHQ QUESTIONS 1-9: 0
2. FEELING DOWN, DEPRESSED OR HOPELESS: 0
SUM OF ALL RESPONSES TO PHQ QUESTIONS 1-9: 0

## 2022-02-01 NOTE — PROGRESS NOTES
Subjective:      Patient ID: Abiel Gonzales is a 64 y.o. female. HPI  Acute visit for right ear pain over the last month. Sense of f.b. or plug. Tried ear drops. Echo sound, decreased hearing. No drainage. No perceived illness. Sense of being off balance at times. Nose feels stuffy. No rhinorrhea or cough. Trouble sleeping do to ear pain. Past Medical History:   Diagnosis Date    Alcohol abuse     Carpal tunnel syndrome, bilateral     Chronic back pain     Depression with anxiety     HGSIL (high grade squamous intraepithelial lesion) on Pap smear     Insomnia     Migraine     Recreational drug use     Marijuana.  Tobacco abuse      Past Surgical History:   Procedure Laterality Date     SECTION      COLPOSCOPY  2002    With laser cone biopsy of the cervix.  TUBAL LIGATION       Current Outpatient Medications   Medication Sig Dispense Refill    ALPRAZolam (XANAX) 1 MG tablet take 1 tablet by mouth twice a day if needed for anxiety 60 tablet 0    HYDROcodone-acetaminophen (NORCO) 5-325 MG per tablet Take 1 tablet by mouth 2 times daily as needed for Pain for up to 30 days. 60 tablet 0    traZODone (DESYREL) 50 MG tablet Take 1 tablet by mouth nightly 30 tablet 11    meloxicam (MOBIC) 15 MG tablet Take 1 tablet by mouth daily 30 tablet 3    triamcinolone (KENALOG) 0.1 % cream Apply topically 2 times daily. 80 g 2    simvastatin (ZOCOR) 20 MG tablet Take 1 tablet by mouth nightly 90 tablet 1    clotrimazole-betamethasone (LOTRISONE) 1-0.05 % cream Apply topically 2 times daily. 45 g 1    fluocinonide-emollient (LIDEX-E) 0.05 % cream Apply topically 2 times daily. 60 g 1    aspirin 81 MG tablet Take 81 mg by mouth daily       No current facility-administered medications for this visit. Allergies   Allergen Reactions    Naproxen      Nausea/shaky feeling    Requip [Ropinirole] Other (See Comments)     Hypotension and syncope.            Review of Systems Constitutional: Positive for fatigue. HENT: Positive for congestion, ear pain and hearing loss. Negative for ear discharge, nosebleeds, postnasal drip, rhinorrhea, sinus pressure, sore throat and voice change. Eyes: Negative. Respiratory: Negative. Negative for cough. Cardiovascular: Negative. Gastrointestinal: Negative. Endocrine: Negative. Genitourinary: Negative. Musculoskeletal: Negative. Skin: Negative. Allergic/Immunologic: Negative. Neurological: Positive for light-headedness. Hematological: Negative. Psychiatric/Behavioral: Negative. Objective:   Physical Exam  Constitutional:       General: She is not in acute distress. Appearance: She is well-developed. HENT:      Head: Normocephalic and atraumatic. Right Ear: External ear normal. Decreased hearing noted. A middle ear effusion is present. There is no impacted cerumen. No foreign body. Tympanic membrane is not injected, perforated or erythematous. Tympanic membrane has decreased mobility (tm opaque, ? effusion). Left Ear: External ear normal.      Mouth/Throat:      Pharynx: No oropharyngeal exudate. Eyes:      General: No scleral icterus. Conjunctiva/sclera: Conjunctivae normal.   Neck:      Thyroid: No thyromegaly. Cardiovascular:      Rate and Rhythm: Normal rate and regular rhythm. Heart sounds: Normal heart sounds. No murmur heard. Pulmonary:      Effort: Pulmonary effort is normal. No respiratory distress. Breath sounds: Normal breath sounds. No wheezing. Abdominal:      General: Bowel sounds are normal. There is no distension. Palpations: Abdomen is soft. Tenderness: There is no abdominal tenderness. There is no rebound. Musculoskeletal:         General: No tenderness. Normal range of motion. Cervical back: Neck supple. Skin:     General: Skin is warm and dry. Findings: No erythema or rash.    Neurological:      Mental Status: She is alert and oriented to person, place, and time. Psychiatric:         Behavior: Behavior normal.         Thought Content: Thought content normal.         Judgment: Judgment normal.       /72 (Site: Right Upper Arm, Position: Sitting, Cuff Size: Large Adult)   Pulse 80   Temp 97.8 °F (36.6 °C) (Tympanic)   Ht 5' 1\" (1.549 m)   Wt 154 lb (69.9 kg)   LMP 08/01/2017 (Within Months)   SpO2 98%   BMI 29.10 kg/m²     Assessment:      Encounter Diagnosis   Name Primary?  Non-recurrent acute suppurative otitis media of right ear without spontaneous rupture of tympanic membrane Yes     Having some imbalance issues at present with decreased hearing. Probable complications of middle ear effusion. Plan:      Amoxicillin 875mg bid x 10 days. Work note off tues, wed, thurs.           Anuja Felipe MD

## 2022-02-14 DIAGNOSIS — H92.09 DISCOMFORT OF EAR, UNSPECIFIED LATERALITY: Primary | ICD-10-CM

## 2022-02-17 DIAGNOSIS — M54.50 CHRONIC BILATERAL LOW BACK PAIN WITHOUT SCIATICA: ICD-10-CM

## 2022-02-17 DIAGNOSIS — G89.29 CHRONIC BILATERAL LOW BACK PAIN WITHOUT SCIATICA: ICD-10-CM

## 2022-02-17 RX ORDER — HYDROCODONE BITARTRATE AND ACETAMINOPHEN 5; 325 MG/1; MG/1
1 TABLET ORAL 2 TIMES DAILY PRN
Qty: 60 TABLET | Refills: 0 | Status: SHIPPED | OUTPATIENT
Start: 2022-02-17 | End: 2022-03-22 | Stop reason: SDUPTHER

## 2022-02-17 NOTE — TELEPHONE ENCOUNTER
Dustin Quezada called requesting a refill of the below medication which has been pended for you:       [place] from [place] reviewed. NORCO 5-325 mg last filled 1/18/22 #60/30 days  Requested Prescriptions     Pending Prescriptions Disp Refills    HYDROcodone-acetaminophen (NORCO) 5-325 MG per tablet 60 tablet 0     Sig: Take 1 tablet by mouth 2 times daily as needed for Pain for up to 30 days. Last Appointment Date: 2/1/2022  Next Appointment Date: Visit date not found    Allergies   Allergen Reactions    Naproxen      Nausea/shaky feeling    Requip [Ropinirole] Other (See Comments)     Hypotension and syncope.

## 2022-02-22 DIAGNOSIS — F41.9 ANXIETY: ICD-10-CM

## 2022-02-23 DIAGNOSIS — F41.9 ANXIETY: ICD-10-CM

## 2022-02-23 RX ORDER — ALPRAZOLAM 1 MG/1
TABLET ORAL
Qty: 60 TABLET | Refills: 0 | Status: CANCELLED | OUTPATIENT
Start: 2022-02-23 | End: 2022-03-25

## 2022-02-24 RX ORDER — ALPRAZOLAM 1 MG/1
1 TABLET ORAL 2 TIMES DAILY PRN
Qty: 60 TABLET | Refills: 0 | Status: SHIPPED | OUTPATIENT
Start: 2022-02-24 | End: 2022-03-22 | Stop reason: SDUPTHER

## 2022-03-11 ENCOUNTER — OFFICE VISIT (OUTPATIENT)
Dept: OTOLARYNGOLOGY | Age: 57
End: 2022-03-11
Payer: COMMERCIAL

## 2022-03-11 VITALS
WEIGHT: 155.2 LBS | BODY MASS INDEX: 29.3 KG/M2 | DIASTOLIC BLOOD PRESSURE: 62 MMHG | SYSTOLIC BLOOD PRESSURE: 110 MMHG | OXYGEN SATURATION: 96 % | HEART RATE: 81 BPM | RESPIRATION RATE: 18 BRPM | HEIGHT: 61 IN

## 2022-03-11 DIAGNOSIS — H93.291 ABNORMAL AUDITORY PERCEPTION OF RIGHT EAR: ICD-10-CM

## 2022-03-11 DIAGNOSIS — H65.91 MIDDLE EAR EFFUSION, RIGHT: Primary | ICD-10-CM

## 2022-03-11 PROCEDURE — 99213 OFFICE O/P EST LOW 20 MIN: CPT | Performed by: OTOLARYNGOLOGY

## 2022-03-11 PROCEDURE — 92567 TYMPANOMETRY: CPT | Performed by: OTOLARYNGOLOGY

## 2022-03-11 RX ORDER — FLUTICASONE PROPIONATE 50 MCG
2 SPRAY, SUSPENSION (ML) NASAL DAILY
Qty: 48 G | Refills: 1 | Status: SHIPPED | OUTPATIENT
Start: 2022-03-11 | End: 2022-05-26 | Stop reason: ALTCHOICE

## 2022-03-11 RX ORDER — METHYLPREDNISOLONE 4 MG/1
TABLET ORAL
Qty: 1 KIT | Refills: 0 | Status: SHIPPED | OUTPATIENT
Start: 2022-03-11 | End: 2022-03-17

## 2022-03-11 NOTE — PROGRESS NOTES
3/11/2022 11:23 AM JERAMY Pham (:  1965) is a 62 y.o. female,New patient, here for evaluation of the following chief complaint(s):  Otalgia (nw - ear pain right x 2 months)      ASSESSMENT/PLAN:  1. Middle ear effusion, right    2. Abnormal auditory perception of right ear      1. Middle ear effusion, right  2. Abnormal auditory perception of right ear    Flonase once a day  Medrol Dosepak  Follow-up in a month to recheck the right ear  If continued effusion, recommend right myringotomy with or without ear tube  Continue to attempt auto insufflation    No follow-ups on file. SUBJECTIVE/OBJECTIVE:  HPI   80-year-old woman with a 2-month history of right-sided hearing change. She was treated with antibiotics for suspected middle ear effusion without any improvement. She has not had any drainage or discomfort. She describes a clogged feeling and a pressure sensation. She feels like she is hearing over a seashell up to her ear. She tried over-the-counter eardrops without any improvement. When it first started she had some equilibrium problems. When she is around loud noises the pressure feeling worsens. She is not able to pop her ears. She has a history of occasional seasonal allergies. Does endorse tobacco and marijuana use. Past Medical History:   Diagnosis Date    Alcohol abuse     Carpal tunnel syndrome, bilateral     Chronic back pain     Depression with anxiety     HGSIL (high grade squamous intraepithelial lesion) on Pap smear     Insomnia     Migraine     Recreational drug use     Marijuana.  Tobacco abuse      Past Surgical History:   Procedure Laterality Date     SECTION      COLPOSCOPY  2002    With laser cone biopsy of the cervix.     TUBAL LIGATION       Social History     Tobacco History     Smoking Status  Current Every Day Smoker Last attempt to quit  2018 Smoking Frequency  0.2 packs/day for 37 years (7.4 pk yrs) Smoking Tobacco Type  Cigarettes    Smokeless Tobacco Use  Never Used          Alcohol History     Alcohol Use Status  Yes Drinks/Week  0 Standard drinks or equivalent per week Amount  0.0 standard drinks of alcohol/wk Comment  occasionally          Drug Use     Drug Use Status  No          Sexual Activity     Sexually Active  Not Asked              Family History   Problem Relation Age of Onset    Heart Attack Father          of myocardial infarction.  Other Father         MS    Heart Disease Father     Diabetes Neg Hx     Glaucoma Neg Hx     Cataracts Neg Hx      Current Outpatient Medications   Medication Instructions    ALPRAZolam (XANAX) 1 mg, Oral, 2 TIMES DAILY PRN    aspirin 81 mg, Oral, DAILY    clotrimazole-betamethasone (LOTRISONE) 1-0.05 % cream Apply topically 2 times daily.  fluocinonide-emollient (LIDEX-E) 0.05 % cream Apply topically 2 times daily.  fluticasone (FLONASE) 50 MCG/ACT nasal spray 2 sprays, Each Nostril, DAILY    HYDROcodone-acetaminophen (NORCO) 5-325 MG per tablet 1 tablet, Oral, 2 TIMES DAILY PRN    meloxicam (MOBIC) 15 mg, Oral, DAILY    methylPREDNISolone (MEDROL DOSEPACK) 4 MG tablet Take by mouth.  simvastatin (ZOCOR) 20 mg, Oral, NIGHTLY    traZODone (DESYREL) 50 mg, Oral, NIGHTLY    triamcinolone (KENALOG) 0.1 % cream Apply topically 2 times daily. Allergies   Allergen Reactions    Naproxen      Nausea/shaky feeling    Requip [Ropinirole] Other (See Comments)     Hypotension and syncope. ENT ROS: positive for - hearing change    General: The patient is found to be alert and normally responsive female. Hearing: grossly normal   Voice: Clear   Skin: The skin has normal colour and turgor. Face: The facial contour is symmetric at rest and with movement. Ears: The pinnae have normal contours.     AD: EAC clear, TM intact, ++ retraction, effusion  Type B tympanometry   AS: EAC clear, TM intact, mild retraction  Eye: The ocular movements are full and symmetric, the conjunctiva is unremarkable; sclera are anicteric, pupillary response is symmetric. No nystagmus is found. Nose:   The external nasal contour is normal  Anterior clear   Oral cavity:   The dentition is healthy. The oral mucosa is without lesions;  the tongue is symmetric with full mobility and is without fasciculation. The soft palate is symmetric. The oropharynx is unremarkable. Neck: The neck has a normal contour; no masses are found on palpation        An electronic signature was used to authenticate this note.     --Carmel Hamilton MD     3/11/2022 11:23 AM EST

## 2022-03-14 RX ORDER — TRIAMCINOLONE ACETONIDE 1 MG/G
CREAM TOPICAL
Qty: 80 G | Refills: 2 | Status: SHIPPED | OUTPATIENT
Start: 2022-03-14 | End: 2022-05-31 | Stop reason: SDUPTHER

## 2022-03-21 DIAGNOSIS — M54.50 CHRONIC BILATERAL LOW BACK PAIN WITHOUT SCIATICA: ICD-10-CM

## 2022-03-21 DIAGNOSIS — G89.29 CHRONIC BILATERAL LOW BACK PAIN WITHOUT SCIATICA: ICD-10-CM

## 2022-03-21 RX ORDER — HYDROCODONE BITARTRATE AND ACETAMINOPHEN 5; 325 MG/1; MG/1
TABLET ORAL
Qty: 60 TABLET | Refills: 0 | OUTPATIENT
Start: 2022-03-21 | End: 2022-04-20

## 2022-03-21 NOTE — TELEPHONE ENCOUNTER
Pt MEDD is too high to sign without a opioid contract. Medication will need to wait until PCP returns. Controlled Substance Monitoring:    Acute and Chronic Pain Monitoring:   RX Monitoring 10/26/2021   Attestation -   Periodic Controlled Substance Monitoring Possible medication side effects, risk of tolerance/dependence & alternative treatments discussed. ;No signs of potential drug abuse or diversion identified. ;Assessed functional status.    Chronic Pain > 80 MEDD -

## 2022-03-22 ENCOUNTER — PATIENT MESSAGE (OUTPATIENT)
Dept: FAMILY MEDICINE CLINIC | Age: 57
End: 2022-03-22

## 2022-03-22 DIAGNOSIS — G89.29 CHRONIC BILATERAL LOW BACK PAIN WITHOUT SCIATICA: ICD-10-CM

## 2022-03-22 DIAGNOSIS — M54.50 CHRONIC BILATERAL LOW BACK PAIN WITHOUT SCIATICA: ICD-10-CM

## 2022-03-22 DIAGNOSIS — F41.9 ANXIETY: ICD-10-CM

## 2022-03-22 RX ORDER — ALPRAZOLAM 1 MG/1
1 TABLET ORAL 2 TIMES DAILY PRN
Qty: 60 TABLET | Refills: 0 | Status: SHIPPED | OUTPATIENT
Start: 2022-03-22 | End: 2022-04-25

## 2022-03-22 RX ORDER — HYDROCODONE BITARTRATE AND ACETAMINOPHEN 5; 325 MG/1; MG/1
1 TABLET ORAL 2 TIMES DAILY PRN
Qty: 60 TABLET | Refills: 0 | Status: CANCELLED | OUTPATIENT
Start: 2022-03-22 | End: 2022-04-21

## 2022-03-22 RX ORDER — HYDROCODONE BITARTRATE AND ACETAMINOPHEN 5; 325 MG/1; MG/1
1 TABLET ORAL 2 TIMES DAILY PRN
Qty: 60 TABLET | Refills: 0 | Status: SHIPPED | OUTPATIENT
Start: 2022-03-22 | End: 2022-04-20 | Stop reason: SDUPTHER

## 2022-03-22 NOTE — TELEPHONE ENCOUNTER
From: Radha Paul  To: Dr. James Marin: 3/22/2022 9:03 AM EDT  Subject: [place]    Need to have my refill filled of norco

## 2022-03-25 DIAGNOSIS — F41.9 ANXIETY: ICD-10-CM

## 2022-03-28 RX ORDER — ALPRAZOLAM 1 MG/1
1 TABLET ORAL 2 TIMES DAILY PRN
Qty: 60 TABLET | Refills: 0 | OUTPATIENT
Start: 2022-03-28 | End: 2022-04-27

## 2022-04-14 ENCOUNTER — OFFICE VISIT (OUTPATIENT)
Dept: FAMILY MEDICINE CLINIC | Age: 57
End: 2022-04-14
Payer: COMMERCIAL

## 2022-04-14 VITALS
TEMPERATURE: 97.2 F | DIASTOLIC BLOOD PRESSURE: 64 MMHG | HEART RATE: 81 BPM | WEIGHT: 152.8 LBS | SYSTOLIC BLOOD PRESSURE: 118 MMHG | BODY MASS INDEX: 28.85 KG/M2 | OXYGEN SATURATION: 96 % | HEIGHT: 61 IN

## 2022-04-14 DIAGNOSIS — G89.29 CHRONIC BILATERAL LOW BACK PAIN WITHOUT SCIATICA: ICD-10-CM

## 2022-04-14 DIAGNOSIS — M18.0 OSTEOARTHRITIS OF BOTH THUMBS: Primary | ICD-10-CM

## 2022-04-14 DIAGNOSIS — R42 DIZZINESS ON STANDING: ICD-10-CM

## 2022-04-14 DIAGNOSIS — M54.50 CHRONIC BILATERAL LOW BACK PAIN WITHOUT SCIATICA: ICD-10-CM

## 2022-04-14 DIAGNOSIS — M18.0 ARTHRITIS OF CARPOMETACARPAL (CMC) JOINT OF BOTH THUMBS: ICD-10-CM

## 2022-04-14 PROCEDURE — 99214 OFFICE O/P EST MOD 30 MIN: CPT | Performed by: FAMILY MEDICINE

## 2022-04-14 RX ORDER — MIDODRINE HYDROCHLORIDE 5 MG/1
5 TABLET ORAL 3 TIMES DAILY
Qty: 90 TABLET | Refills: 3 | Status: SHIPPED | OUTPATIENT
Start: 2022-04-14

## 2022-04-14 SDOH — ECONOMIC STABILITY: FOOD INSECURITY: WITHIN THE PAST 12 MONTHS, THE FOOD YOU BOUGHT JUST DIDN'T LAST AND YOU DIDN'T HAVE MONEY TO GET MORE.: NEVER TRUE

## 2022-04-14 SDOH — ECONOMIC STABILITY: FOOD INSECURITY: WITHIN THE PAST 12 MONTHS, YOU WORRIED THAT YOUR FOOD WOULD RUN OUT BEFORE YOU GOT MONEY TO BUY MORE.: NEVER TRUE

## 2022-04-14 ASSESSMENT — SOCIAL DETERMINANTS OF HEALTH (SDOH): HOW HARD IS IT FOR YOU TO PAY FOR THE VERY BASICS LIKE FOOD, HOUSING, MEDICAL CARE, AND HEATING?: NOT HARD AT ALL

## 2022-04-14 ASSESSMENT — ENCOUNTER SYMPTOMS
ALLERGIC/IMMUNOLOGIC NEGATIVE: 1
GASTROINTESTINAL NEGATIVE: 1
EYES NEGATIVE: 1
RESPIRATORY NEGATIVE: 1

## 2022-04-14 NOTE — PROGRESS NOTES
Patient reports intermittent episodes where her head feels heavy, becomes nauseated, short of breath, becomes off balanced, and vision become blurry. Patient occurs that this has been happening daily. Patient reports last use of tobacco was 2 weeks ago and last alcohol use was 3 weeks ago. Reports that she did have covid booster 3/11/22. Card copied for chart.

## 2022-04-14 NOTE — PROGRESS NOTES
Subjective:      Patient ID: Marine Mosqueda is a 62 y.o. female. HPI   Acute visit for several concerns. Having issues with dizziness. More of a sense of being off balance. Some sinus and ear issues. Consider tm tube at one point. She describes more of a sense of swaying side to side after standing. Wobbling at times. Intermittent headaches and diaphoresis episodes. Some neck pain radiating to the left shoulder. Some sense of heart racing at times. She wonders about panic attacks. Doesn't feel she has any residual hot flashes from menopause. She feels lightheaded at times. Also describing intermittent nausea and headaches. The nausea and headaches seem bundled together. No vomiting. Describing episodes of standing with acute dizziness and nausea. Bilateral hand and wrist pain ongoing/chronic. Most of her pain is in the hands and wrists at this time. Repetitive work as part of her job. Thumb pain the worst.  Tried braces, creams/topicals and medications without much improvement. Past Medical History:   Diagnosis Date    Alcohol abuse     Carpal tunnel syndrome, bilateral     Chronic back pain     Depression with anxiety     HGSIL (high grade squamous intraepithelial lesion) on Pap smear     Insomnia     Migraine     Recreational drug use     Marijuana.  Tobacco abuse      Past Surgical History:   Procedure Laterality Date     SECTION      COLPOSCOPY  2002    With laser cone biopsy of the cervix.  TUBAL LIGATION          Current Outpatient Medications   Medication Sig Dispense Refill    ALPRAZolam (XANAX) 1 MG tablet Take 1 tablet by mouth 2 times daily as needed for Sleep for up to 30 days. 60 tablet 0    HYDROcodone-acetaminophen (NORCO) 5-325 MG per tablet Take 1 tablet by mouth 2 times daily as needed for Pain for up to 30 days. 60 tablet 0    triamcinolone (KENALOG) 0.1 % cream Apply topically 2 times daily.  80 g 2    fluticasone (FLONASE) 50 MCG/ACT nasal spray 2 sprays by Each Nostril route daily (Patient taking differently: 2 sprays by Each Nostril route as needed ) 48 g 1    simvastatin (ZOCOR) 20 MG tablet Take 1 tablet by mouth nightly 90 tablet 1    meloxicam (MOBIC) 15 MG tablet Take 1 tablet by mouth daily 90 tablet 1    traZODone (DESYREL) 50 MG tablet Take 1 tablet by mouth nightly 30 tablet 11    clotrimazole-betamethasone (LOTRISONE) 1-0.05 % cream Apply topically 2 times daily. 45 g 1    fluocinonide-emollient (LIDEX-E) 0.05 % cream Apply topically 2 times daily. 60 g 1    aspirin 81 MG tablet Take 81 mg by mouth daily       No current facility-administered medications for this visit. Allergies   Allergen Reactions    Naproxen      Nausea/shaky feeling    Requip [Ropinirole] Other (See Comments)     Hypotension and syncope. Review of Systems   Constitutional: Negative. HENT: Negative. Eyes: Negative. Respiratory: Negative. Cardiovascular: Positive for palpitations. Negative for chest pain. Gastrointestinal: Negative. Endocrine: Negative. Genitourinary: Negative. Musculoskeletal: Positive for arthralgias. Skin: Negative. Allergic/Immunologic: Negative. Neurological: Positive for dizziness and light-headedness. Negative for weakness. Hematological: Negative. Psychiatric/Behavioral: Negative. Objective:   Physical Exam  Constitutional:       General: She is not in acute distress. Appearance: She is well-developed. HENT:      Head: Normocephalic and atraumatic. Right Ear: External ear normal.      Left Ear: External ear normal.      Mouth/Throat:      Pharynx: No oropharyngeal exudate. Eyes:      General: No scleral icterus. Conjunctiva/sclera: Conjunctivae normal.   Neck:      Thyroid: No thyromegaly. Cardiovascular:      Rate and Rhythm: Normal rate and regular rhythm. Heart sounds: Normal heart sounds. No murmur heard.       Pulmonary:      Effort: Pulmonary effort is normal. No respiratory distress. Breath sounds: Normal breath sounds. No wheezing. Abdominal:      General: Bowel sounds are normal. There is no distension. Palpations: Abdomen is soft. Tenderness: There is no abdominal tenderness. There is no rebound. Musculoskeletal:         General: No tenderness. Normal range of motion. Cervical back: Neck supple. Skin:     General: Skin is warm and dry. Findings: No erythema or rash. Neurological:      Mental Status: She is alert and oriented to person, place, and time. Psychiatric:         Behavior: Behavior normal.         Thought Content: Thought content normal.         Judgment: Judgment normal.       /64 (Site: Right Upper Arm, Position: Sitting, Cuff Size: Large Adult)   Pulse 81   Temp 97.2 °F (36.2 °C) (Temporal)   Ht 5' 1\" (1.549 m)   Wt 152 lb 12.8 oz (69.3 kg)   LMP 08/01/2017 (Within Months)   SpO2 96%   BMI 28.87 kg/m²   No visits with results within 1 Month(s) from this visit.    Latest known visit with results is:   Hospital Outpatient Visit on 03/26/2021   Component Date Value Ref Range Status    Cholesterol 03/26/2021 206* <200 mg/dL Final    HDL 03/26/2021 57  >40 mg/dL Final    LDL Cholesterol 03/26/2021 123  0 - 130 mg/dL Final    Chol/HDL Ratio 03/26/2021 3.6  <5 Final    Triglycerides 03/26/2021 131  <150 mg/dL Final    VLDL 03/26/2021 NOT REPORTED  1 - 30 mg/dL Final    WBC 03/26/2021 6.5  3.5 - 11.3 k/uL Final    RBC 03/26/2021 4.61  3.95 - 5.11 m/uL Final    Hemoglobin 03/26/2021 15.1  11.9 - 15.1 g/dL Final    Hematocrit 03/26/2021 45.7  36.3 - 47.1 % Final    MCV 03/26/2021 99.1  82.6 - 102.9 fL Final    MCH 03/26/2021 32.8  25.2 - 33.5 pg Final    MCHC 03/26/2021 33.0  25.2 - 33.5 g/dL Final    RDW 03/26/2021 12.4  11.8 - 14.4 % Final    Platelets 11/98/7885 259  138 - 453 k/uL Final    MPV 03/26/2021 9.1  8.1 - 13.5 fL Final    NRBC Automated 03/26/2021 0.0  0.0 per 100 WBC Final    Differential Type 03/26/2021 NOT REPORTED   Final    Seg Neutrophils 03/26/2021 57  36 - 65 % Final    Lymphocytes 03/26/2021 28  24 - 43 % Final    Monocytes 03/26/2021 9  3 - 12 % Final    Eosinophils % 03/26/2021 5* 1 - 4 % Final    Basophils 03/26/2021 1  0 - 2 % Final    Immature Granulocytes 03/26/2021 0  0 % Final    Segs Absolute 03/26/2021 3.71  1.50 - 8.10 k/uL Final    Absolute Lymph # 03/26/2021 1.85  1.10 - 3.70 k/uL Final    Absolute Mono # 03/26/2021 0.61  0.10 - 1.20 k/uL Final    Absolute Eos # 03/26/2021 0.30  0.00 - 0.44 k/uL Final    Basophils Absolute 03/26/2021 0.03  0.00 - 0.20 k/uL Final    Absolute Immature Granulocyte 03/26/2021 <0.03  0.00 - 0.30 k/uL Final    WBC Morphology 03/26/2021 NOT REPORTED   Final    RBC Morphology 03/26/2021 NOT REPORTED   Final    Platelet Estimate 56/47/2844 NOT REPORTED   Final    Glucose 03/26/2021 103* 70 - 99 mg/dL Final    BUN 03/26/2021 15  6 - 20 mg/dL Final    CREATININE 03/26/2021 0.71  0.50 - 0.90 mg/dL Final    Bun/Cre Ratio 03/26/2021 21* 9 - 20 Final    Calcium 03/26/2021 9.4  8.6 - 10.4 mg/dL Final    Sodium 03/26/2021 140  135 - 144 mmol/L Final    Potassium 03/26/2021 4.0  3.7 - 5.3 mmol/L Final    Chloride 03/26/2021 104  98 - 107 mmol/L Final    CO2 03/26/2021 27  20 - 31 mmol/L Final    Anion Gap 03/26/2021 9  9 - 17 mmol/L Final    GFR Non- 03/26/2021 >60  >60 mL/min Final    GFR  03/26/2021 >60  >60 mL/min Final    GFR Comment 03/26/2021        Final    GFR Staging 03/26/2021 NOT REPORTED   Final       Assessment:      Encounter Diagnoses   Name Primary?  Osteoarthritis of both thumbs Yes    Arthritis of carpometacarpal (CMC) joint of both thumbs     Chronic bilateral low back pain without sciatica     Dizziness on standing            Plan:      oa pain in hands and wrists problematic. Missing some work due to hand pain.   Basal joint arthritis of both thumbs seem to be the most problematic. Discussed injection and surgical options. She feels she is going to need intervention to continue work because of the level of pain and disability at present. Cant get her buttons done at time . Plan ortho consult. Back pain fairly constant but mild at present. Opioid consent forms renewed today. She has used norco long term for moderate to severe pain. No dosing escalations. Reliable with use. She doesn't feel she would be able to work     Episodic dizziness with a sense of being off balance. Not describing classic vertigo or vestibulitis . bp running lower, likely orthostatic. She has had issues with heat exposure in the past.  She desires trial of midodrine to help sort out if this is a low bp issue, vs other. Discussed side effects.           Fe Calvo MD

## 2022-04-20 DIAGNOSIS — M54.50 CHRONIC BILATERAL LOW BACK PAIN WITHOUT SCIATICA: ICD-10-CM

## 2022-04-20 DIAGNOSIS — G89.29 CHRONIC BILATERAL LOW BACK PAIN WITHOUT SCIATICA: ICD-10-CM

## 2022-04-20 RX ORDER — HYDROCODONE BITARTRATE AND ACETAMINOPHEN 5; 325 MG/1; MG/1
1 TABLET ORAL 2 TIMES DAILY PRN
Qty: 60 TABLET | Refills: 0 | Status: SHIPPED | OUTPATIENT
Start: 2022-04-20 | End: 2022-05-20 | Stop reason: SDUPTHER

## 2022-04-23 DIAGNOSIS — F41.9 ANXIETY: ICD-10-CM

## 2022-04-25 RX ORDER — ALPRAZOLAM 1 MG/1
TABLET ORAL
Qty: 60 TABLET | Refills: 2 | Status: SHIPPED | OUTPATIENT
Start: 2022-04-25 | End: 2022-05-26 | Stop reason: SDUPTHER

## 2022-05-04 DIAGNOSIS — M79.644 PAIN IN FINGER OF BOTH HANDS: Primary | ICD-10-CM

## 2022-05-04 DIAGNOSIS — M79.645 PAIN IN FINGER OF BOTH HANDS: Primary | ICD-10-CM

## 2022-05-11 ENCOUNTER — HOSPITAL ENCOUNTER (OUTPATIENT)
Dept: GENERAL RADIOLOGY | Age: 57
Discharge: HOME | End: 2022-05-13
Payer: COMMERCIAL

## 2022-05-11 ENCOUNTER — OFFICE VISIT (OUTPATIENT)
Dept: ORTHOPEDIC SURGERY | Age: 57
End: 2022-05-11
Payer: COMMERCIAL

## 2022-05-11 VITALS
BODY MASS INDEX: 28.7 KG/M2 | DIASTOLIC BLOOD PRESSURE: 82 MMHG | HEIGHT: 61 IN | HEART RATE: 77 BPM | WEIGHT: 152 LBS | SYSTOLIC BLOOD PRESSURE: 133 MMHG

## 2022-05-11 DIAGNOSIS — M79.644 PAIN IN FINGER OF BOTH HANDS: Primary | ICD-10-CM

## 2022-05-11 DIAGNOSIS — M79.645 PAIN IN FINGER OF BOTH HANDS: ICD-10-CM

## 2022-05-11 DIAGNOSIS — M79.644 PAIN IN FINGER OF BOTH HANDS: ICD-10-CM

## 2022-05-11 DIAGNOSIS — M79.645 PAIN IN FINGER OF BOTH HANDS: Primary | ICD-10-CM

## 2022-05-11 PROCEDURE — 73130 X-RAY EXAM OF HAND: CPT

## 2022-05-11 PROCEDURE — 99204 OFFICE O/P NEW MOD 45 MIN: CPT | Performed by: ORTHOPAEDIC SURGERY

## 2022-05-13 NOTE — PROGRESS NOTES
History and Physical    Patient's Name/Date of Birth: Mac Pham / 1965, 62 y.o. yo    Date: May 13, 2022     PCP: Antonette Luna MD     History of Present Illness: This 75-year-old right-hand-dominant female presents with a several month history of increasing pain to the base of her right thumb. She reports the pain at a level of 10. She notes no history of injury or trauma. She also complains of a 6-week history of increasing numbness and tingling to all of her fingers with associated nocturnal symptoms. Reports that the right hand is deftly more symptomatic than the left. At present time she is undergone a trial of braces as well as Motrin which did not provide her with any significant relief. Family history negative    Past Medical History:   Diagnosis Date    Alcohol abuse     Carpal tunnel syndrome, bilateral     Chronic back pain     Depression with anxiety     HGSIL (high grade squamous intraepithelial lesion) on Pap smear     Insomnia     Migraine     Recreational drug use     Marijuana.  Tobacco abuse       Past Surgical History:   Procedure Laterality Date     SECTION      COLPOSCOPY  2002    With laser cone biopsy of the cervix.  TUBAL LIGATION        Allergies: Naproxen and Requip [ropinirole]     Current Outpatient Medications   Medication Sig Dispense Refill    ALPRAZolam (XANAX) 1 MG tablet take 1 tablet by mouth twice a day 60 tablet 2    HYDROcodone-acetaminophen (NORCO) 5-325 MG per tablet Take 1 tablet by mouth 2 times daily as needed for Pain for up to 30 days. 60 tablet 0    midodrine (PROAMATINE) 5 MG tablet Take 1 tablet by mouth 3 times daily 90 tablet 3    triamcinolone (KENALOG) 0.1 % cream Apply topically 2 times daily.  80 g 2    simvastatin (ZOCOR) 20 MG tablet Take 1 tablet by mouth nightly 90 tablet 1    meloxicam (MOBIC) 15 MG tablet Take 1 tablet by mouth daily 90 tablet 1    traZODone (DESYREL) 50 MG tablet Take 1 tablet by mouth nightly 30 tablet 11    clotrimazole-betamethasone (LOTRISONE) 1-0.05 % cream Apply topically 2 times daily. 45 g 1    fluocinonide-emollient (LIDEX-E) 0.05 % cream Apply topically 2 times daily. 60 g 1    aspirin 81 MG tablet Take 81 mg by mouth daily      fluticasone (FLONASE) 50 MCG/ACT nasal spray 2 sprays by Each Nostril route daily (Patient taking differently: 2 sprays by Each Nostril route as needed ) 48 g 1     No current facility-administered medications for this visit. Social History     Tobacco Use    Smoking status: Current Every Day Smoker     Packs/day: 0.20     Years: 37.00     Pack years: 7.40     Types: Cigarettes     Last attempt to quit: 2018     Years since quittin.2    Smokeless tobacco: Never Used   Substance Use Topics    Alcohol use: Yes     Alcohol/week: 0.0 standard drinks     Comment: occasionally        Review of Systems:  Positive for numbness and tingling to all of her fingers  Other than stated above in the HPI is negative      Physical exam:     General appearance: no acute distress  Head: NAD  Neck: supple  Lungs: No audible wheezing, respiratory rate normal  Heart: Perfusion to all extremeties  Extremities: Examination of the right hand revealed that there was some prominence of the thumb CMC joint. She did demonstrate a positive CMC grind test and she was tender to [facility] joint region. MP joint was stable. She did demonstrate positive provocative signs for both carpal tunnel and cubital tunnel syndrome with a positive thumb compression test and positive elbow flexion test.    Radiographs reviewed which do demonstrate signs consistent with bilateral first CMC joint arthritis with joint space narrowing and subluxation. Assessment:  Patient presents with symptomatic bilateral first [facility] joint arthritis with carpal tunnel syndrome and cubital tunnel syndrome the right more symptomatic than on the left.         Plan:  Treatment options were reviewed with the patient including ongoing trials of injections and bracing versus surgical treatment. Patient wished to proceed with surgical treatment and was scheduled for a right Taylor's arthroplasty, carpal tunnel release and ulnar nerve decompression. No orders of the defined types were placed in this encounter.               Ronnell Rizo MD,MD 5/13/2022 at 7:35 PM

## 2022-05-20 DIAGNOSIS — M54.50 CHRONIC BILATERAL LOW BACK PAIN WITHOUT SCIATICA: ICD-10-CM

## 2022-05-20 DIAGNOSIS — G89.29 CHRONIC BILATERAL LOW BACK PAIN WITHOUT SCIATICA: ICD-10-CM

## 2022-05-20 RX ORDER — HYDROCODONE BITARTRATE AND ACETAMINOPHEN 5; 325 MG/1; MG/1
1 TABLET ORAL 2 TIMES DAILY PRN
Qty: 60 TABLET | Refills: 0 | Status: SHIPPED | OUTPATIENT
Start: 2022-05-20 | End: 2022-06-19 | Stop reason: SDUPTHER

## 2022-05-26 DIAGNOSIS — F41.9 ANXIETY: ICD-10-CM

## 2022-05-27 RX ORDER — ALPRAZOLAM 1 MG/1
TABLET ORAL
Qty: 60 TABLET | Refills: 2 | Status: SHIPPED | OUTPATIENT
Start: 2022-05-27 | End: 2022-06-26 | Stop reason: SDUPTHER

## 2022-05-31 ENCOUNTER — OFFICE VISIT (OUTPATIENT)
Dept: FAMILY MEDICINE CLINIC | Age: 57
End: 2022-05-31
Payer: COMMERCIAL

## 2022-05-31 VITALS
BODY MASS INDEX: 28.51 KG/M2 | DIASTOLIC BLOOD PRESSURE: 70 MMHG | HEIGHT: 61 IN | SYSTOLIC BLOOD PRESSURE: 128 MMHG | HEART RATE: 68 BPM | WEIGHT: 151 LBS

## 2022-05-31 DIAGNOSIS — Z01.818 PREOPERATIVE GENERAL PHYSICAL EXAMINATION: Primary | ICD-10-CM

## 2022-05-31 PROCEDURE — 99214 OFFICE O/P EST MOD 30 MIN: CPT | Performed by: FAMILY MEDICINE

## 2022-05-31 RX ORDER — TRIAMCINOLONE ACETONIDE 1 MG/G
CREAM TOPICAL
Qty: 80 G | Refills: 2 | Status: SHIPPED | OUTPATIENT
Start: 2022-05-31 | End: 2022-07-20 | Stop reason: SDUPTHER

## 2022-05-31 ASSESSMENT — PATIENT HEALTH QUESTIONNAIRE - PHQ9
6. FEELING BAD ABOUT YOURSELF - OR THAT YOU ARE A FAILURE OR HAVE LET YOURSELF OR YOUR FAMILY DOWN: 0
3. TROUBLE FALLING OR STAYING ASLEEP: 0
10. IF YOU CHECKED OFF ANY PROBLEMS, HOW DIFFICULT HAVE THESE PROBLEMS MADE IT FOR YOU TO DO YOUR WORK, TAKE CARE OF THINGS AT HOME, OR GET ALONG WITH OTHER PEOPLE: 0
8. MOVING OR SPEAKING SO SLOWLY THAT OTHER PEOPLE COULD HAVE NOTICED. OR THE OPPOSITE, BEING SO FIGETY OR RESTLESS THAT YOU HAVE BEEN MOVING AROUND A LOT MORE THAN USUAL: 0
9. THOUGHTS THAT YOU WOULD BE BETTER OFF DEAD, OR OF HURTING YOURSELF: 0
5. POOR APPETITE OR OVEREATING: 0
SUM OF ALL RESPONSES TO PHQ9 QUESTIONS 1 & 2: 0
SUM OF ALL RESPONSES TO PHQ QUESTIONS 1-9: 0
1. LITTLE INTEREST OR PLEASURE IN DOING THINGS: 0
SUM OF ALL RESPONSES TO PHQ QUESTIONS 1-9: 0
SUM OF ALL RESPONSES TO PHQ QUESTIONS 1-9: 0
7. TROUBLE CONCENTRATING ON THINGS, SUCH AS READING THE NEWSPAPER OR WATCHING TELEVISION: 0
2. FEELING DOWN, DEPRESSED OR HOPELESS: 0
4. FEELING TIRED OR HAVING LITTLE ENERGY: 0
SUM OF ALL RESPONSES TO PHQ QUESTIONS 1-9: 0

## 2022-05-31 ASSESSMENT — ENCOUNTER SYMPTOMS
EYES NEGATIVE: 1
ALLERGIC/IMMUNOLOGIC NEGATIVE: 1
RESPIRATORY NEGATIVE: 1
GASTROINTESTINAL NEGATIVE: 1

## 2022-05-31 NOTE — PROGRESS NOTES
Subjective:      Patient ID: Dara Salazar is a 62 y.o. female. HPI  Scheduled pre operative physical prior to planned surgeries on right hand. Multiple issues, mostly overuse . Planning right thumb Taylor's  arthroplasty, CTR, and ulnar nerve release. Feeling well. Ongoing pain in the thumb. Trouble tying shoes at times. She relates further repair in the future of the left had as well, staged after recovering from this surgery. No recent illness or injury. Feeling ok at present . Past Medical History:   Diagnosis Date    Alcohol abuse     Carpal tunnel syndrome, bilateral     Chronic back pain     Depression with anxiety     HGSIL (high grade squamous intraepithelial lesion) on Pap smear     Insomnia     Migraine     Recreational drug use     Marijuana.  Tobacco abuse      Past Surgical History:   Procedure Laterality Date     SECTION      COLPOSCOPY  2002    With laser cone biopsy of the cervix.  TUBAL LIGATION       Current Outpatient Medications   Medication Sig Dispense Refill    ALPRAZolam (XANAX) 1 MG tablet take 1 tablet by mouth twice a day 60 tablet 2    HYDROcodone-acetaminophen (NORCO) 5-325 MG per tablet Take 1 tablet by mouth 2 times daily as needed for Pain for up to 30 days. 60 tablet 0    midodrine (PROAMATINE) 5 MG tablet Take 1 tablet by mouth 3 times daily 90 tablet 3    triamcinolone (KENALOG) 0.1 % cream Apply topically 2 times daily. 80 g 2    simvastatin (ZOCOR) 20 MG tablet Take 1 tablet by mouth nightly 90 tablet 1    meloxicam (MOBIC) 15 MG tablet Take 1 tablet by mouth daily 90 tablet 1    traZODone (DESYREL) 50 MG tablet Take 1 tablet by mouth nightly 30 tablet 11    aspirin 81 MG tablet Take 81 mg by mouth daily       No current facility-administered medications for this visit. Allergies   Allergen Reactions    Naproxen      Nausea/shaky feeling    Requip [Ropinirole] Other (See Comments)     Hypotension and syncope. Review of Systems   Constitutional: Negative. HENT: Negative. Eyes: Negative. Respiratory: Negative. Cardiovascular: Negative for chest pain and palpitations. Gastrointestinal: Negative. Endocrine: Negative. Genitourinary: Negative. Musculoskeletal: Positive for arthralgias. Skin: Negative. Allergic/Immunologic: Negative. Neurological: Positive for numbness. Negative for dizziness, weakness and light-headedness. Hematological: Negative. Psychiatric/Behavioral: Negative. Objective:   Physical Exam  Constitutional:       General: She is not in acute distress. Appearance: She is well-developed. HENT:      Head: Normocephalic and atraumatic. Right Ear: External ear normal.      Left Ear: External ear normal.      Mouth/Throat:      Dentition: Has dentures. Pharynx: No oropharyngeal exudate. Eyes:      General: No scleral icterus. Conjunctiva/sclera: Conjunctivae normal.   Neck:      Thyroid: No thyromegaly. Cardiovascular:      Rate and Rhythm: Normal rate and regular rhythm. Heart sounds: Normal heart sounds. No murmur heard. Pulmonary:      Effort: Pulmonary effort is normal. No respiratory distress. Breath sounds: Normal breath sounds. No wheezing. Abdominal:      General: Bowel sounds are normal. There is no distension. Palpations: Abdomen is soft. Tenderness: There is no abdominal tenderness. There is no rebound. Musculoskeletal:         General: No tenderness. Normal range of motion. Cervical back: Neck supple. Skin:     General: Skin is warm and dry. Findings: No erythema or rash. Neurological:      Mental Status: She is alert and oriented to person, place, and time. Psychiatric:         Behavior: Behavior normal.         Thought Content:  Thought content normal.         Judgment: Judgment normal.       /70 (Site: Right Upper Arm, Position: Sitting, Cuff Size: Large Adult)   Pulse 68   Ht 5' 1\" (1.549 m)   Wt 151 lb (68.5 kg)   LMP 08/01/2017 (Within Months)   BMI 28.53 kg/m²     Assessment:      Encounter Diagnoses   Name Primary?  Preoperative general physical examination Yes           Plan:      No contraindications to pursuing surgeries as planned. She will stop asa and mobic for one week prior to surgery.           Krzysztof Rollins MD

## 2022-06-01 DIAGNOSIS — Z01.818 PREOPERATIVE EXAMINATION: Primary | ICD-10-CM

## 2022-06-08 ENCOUNTER — HOSPITAL ENCOUNTER (OUTPATIENT)
Dept: LAB | Age: 57
Discharge: HOME | End: 2022-06-08
Payer: COMMERCIAL

## 2022-06-08 ENCOUNTER — TELEPHONE (OUTPATIENT)
Dept: ORTHOPEDIC SURGERY | Age: 57
End: 2022-06-08

## 2022-06-08 ENCOUNTER — NURSE ONLY (OUTPATIENT)
Dept: ORTHOPEDIC SURGERY | Age: 57
End: 2022-06-08
Payer: COMMERCIAL

## 2022-06-08 ENCOUNTER — HOSPITAL ENCOUNTER (OUTPATIENT)
Dept: NON INVASIVE DIAGNOSTICS | Age: 57
Discharge: HOME | End: 2022-06-08
Payer: COMMERCIAL

## 2022-06-08 DIAGNOSIS — Z01.818 PREOPERATIVE EXAMINATION: ICD-10-CM

## 2022-06-08 DIAGNOSIS — Z01.818 PRE-OP TESTING: ICD-10-CM

## 2022-06-08 LAB
ABSOLUTE EOS #: 0.18 K/UL (ref 0–0.44)
ABSOLUTE IMMATURE GRANULOCYTE: <0.03 K/UL (ref 0–0.3)
ABSOLUTE LYMPH #: 1.71 K/UL (ref 1.1–3.7)
ABSOLUTE MONO #: 0.58 K/UL (ref 0.1–1.2)
ANION GAP SERPL CALCULATED.3IONS-SCNC: 10 MMOL/L (ref 9–17)
BASOPHILS # BLD: 1 % (ref 0–2)
BASOPHILS ABSOLUTE: 0.03 K/UL (ref 0–0.2)
BUN BLDV-MCNC: 19 MG/DL (ref 6–20)
BUN/CREAT BLD: 30 (ref 9–20)
CALCIUM SERPL-MCNC: 9.6 MG/DL (ref 8.6–10.4)
CHLORIDE BLD-SCNC: 101 MMOL/L (ref 98–107)
CO2: 26 MMOL/L (ref 20–31)
CREAT SERPL-MCNC: 0.63 MG/DL (ref 0.5–0.9)
EKG ATRIAL RATE: 76 BPM
EKG P AXIS: 35 DEGREES
EKG P-R INTERVAL: 146 MS
EKG Q-T INTERVAL: 396 MS
EKG QRS DURATION: 84 MS
EKG QTC CALCULATION (BAZETT): 445 MS
EKG R AXIS: 58 DEGREES
EKG T AXIS: 23 DEGREES
EKG VENTRICULAR RATE: 76 BPM
EOSINOPHILS RELATIVE PERCENT: 3 % (ref 1–4)
GFR AFRICAN AMERICAN: >60 ML/MIN
GFR NON-AFRICAN AMERICAN: >60 ML/MIN
GFR SERPL CREATININE-BSD FRML MDRD: ABNORMAL ML/MIN/{1.73_M2}
GLUCOSE BLD-MCNC: 108 MG/DL (ref 70–99)
HCT VFR BLD CALC: 45.1 % (ref 36.3–47.1)
HEMOGLOBIN: 15 G/DL (ref 11.9–15.1)
IMMATURE GRANULOCYTES: 0 %
LYMPHOCYTES # BLD: 30 % (ref 24–43)
MCH RBC QN AUTO: 31.6 PG (ref 25.2–33.5)
MCHC RBC AUTO-ENTMCNC: 33.3 G/DL (ref 25.2–33.5)
MCV RBC AUTO: 95.1 FL (ref 82.6–102.9)
MONOCYTES # BLD: 10 % (ref 3–12)
NRBC AUTOMATED: 0 PER 100 WBC
PDW BLD-RTO: 12.1 % (ref 11.8–14.4)
PLATELET # BLD: 307 K/UL (ref 138–453)
PMV BLD AUTO: 9.1 FL (ref 8.1–13.5)
POTASSIUM SERPL-SCNC: 4.7 MMOL/L (ref 3.7–5.3)
RBC # BLD: 4.74 M/UL (ref 3.95–5.11)
SEG NEUTROPHILS: 56 % (ref 36–65)
SEGMENTED NEUTROPHILS ABSOLUTE COUNT: 3.15 K/UL (ref 1.5–8.1)
SODIUM BLD-SCNC: 137 MMOL/L (ref 135–144)
WBC # BLD: 5.7 K/UL (ref 3.5–11.3)

## 2022-06-08 PROCEDURE — 80048 BASIC METABOLIC PNL TOTAL CA: CPT

## 2022-06-08 PROCEDURE — 93005 ELECTROCARDIOGRAM TRACING: CPT

## 2022-06-08 PROCEDURE — 85025 COMPLETE CBC W/AUTO DIFF WBC: CPT

## 2022-06-08 PROCEDURE — 99999 PR OFFICE/OUTPT VISIT,PROCEDURE ONLY: CPT | Performed by: ORTHOPAEDIC SURGERY

## 2022-06-08 PROCEDURE — 36415 COLL VENOUS BLD VENIPUNCTURE: CPT

## 2022-06-08 NOTE — TELEPHONE ENCOUNTER
[facility]    Pre-Operative Evaluation/Consultation    Name:  Tavia Hinds                                         Age:  62 y.o. MRN:  [id number]       :  1965   Date:  2022         Sex: female    There were no encounter diagnoses. Surgeon:  Dr. Luis Francisco  Procedure (Planned):  Right CTR/Cubital release/ burtons arthroplasty  Date Scheduled surgery: 22    Attending : No att. providers found    Primary Physician: [address] [place] [address]  Cardiologist: None    Type of Anesthesia Requested: Anesthesia Provider's Choice    Patient Medical history: Allergies   Allergen Reactions    Naproxen      Nausea/shaky feeling    Requip [Ropinirole] Other (See Comments)     Hypotension and syncope. Patient Active Problem List   Diagnosis    Depression    Anxiety    Chronic back pain    Foot pain    Lesion of plantar nerve    Enthesopathy    Tobacco abuse    HGSIL on Pap smear of cervix    HGSIL on cytologic smear of cervix    Hyperopia of both eyes with astigmatism and presbyopia    Chest pain     Past Medical History:   Diagnosis Date    Alcohol abuse     Carpal tunnel syndrome, bilateral     Chronic back pain     Depression with anxiety     HGSIL (high grade squamous intraepithelial lesion) on Pap smear     Insomnia     Migraine     Recreational drug use     Marijuana.  Tobacco abuse      Past Surgical History:   Procedure Laterality Date     SECTION      COLPOSCOPY  2002    With laser cone biopsy of the cervix.  TUBAL LIGATION       Social History     Tobacco Use    Smoking status: Current Every Day Smoker     Packs/day: 0.20     Years: 37.00     Pack years: 7.40     Types: Cigarettes     Last attempt to quit: 2018     Years since quittin.2    Smokeless tobacco: Never Used   Substance Use Topics    Alcohol use:  Yes     Alcohol/week: 0.0 standard drinks     Comment: occasionally    Drug use: No     Medications:  Current Outpatient Medications   Medication Sig Dispense Refill    triamcinolone (KENALOG) 0.1 % cream Apply topically 2 times daily. 80 g 2    ALPRAZolam (XANAX) 1 MG tablet take 1 tablet by mouth twice a day 60 tablet 2    HYDROcodone-acetaminophen (NORCO) 5-325 MG per tablet Take 1 tablet by mouth 2 times daily as needed for Pain for up to 30 days. 60 tablet 0    midodrine (PROAMATINE) 5 MG tablet Take 1 tablet by mouth 3 times daily 90 tablet 3    simvastatin (ZOCOR) 20 MG tablet Take 1 tablet by mouth nightly 90 tablet 1    meloxicam (MOBIC) 15 MG tablet Take 1 tablet by mouth daily 90 tablet 1    traZODone (DESYREL) 50 MG tablet Take 1 tablet by mouth nightly 30 tablet 11    aspirin 81 MG tablet Take 81 mg by mouth daily       No current facility-administered medications for this visit. Scheduled Meds:  Continuous Infusions:  PRN Meds:. Prior to Admission medications    Medication Sig Start Date End Date Taking? Authorizing Provider   triamcinolone (KENALOG) 0.1 % cream Apply topically 2 times daily. 5/31/22   Aleshia Perez MD   ALPRAZolam [facility]) 1 MG tablet take 1 tablet by mouth twice a day 5/27/22 6/26/22  Aleshia Perez MD   HYDROcodone-acetaminophen [facility]) 5-325 MG per tablet Take 1 tablet by mouth 2 times daily as needed for Pain for up to 30 days.  5/20/22 6/19/22  Aleshia Perez MD   midodrine (PROAMATINE) 5 MG tablet Take 1 tablet by mouth 3 times daily 4/14/22   Aleshia Perez MD   simvastatin (ZOCOR) 20 MG tablet Take 1 tablet by mouth nightly 2/1/22   Aleshia Perez MD   meloxicam [facility]) 15 MG tablet Take 1 tablet by mouth daily 2/1/22   Aleshia Perez MD   traZODone (DESYREL) 50 MG tablet Take 1 tablet by mouth nightly 1/12/22   Aleshia Perez MD   aspirin 81 MG tablet Take 81 mg by mouth daily    Historical Provider, MD     Vital Signs (Current) [unfilled]    Weight:   Wt Readings from Last 1 Encounters:   05/31/22 151 lb (68.5 kg)     Height:   Ht Readings from Last 1 Encounters:   05/31/22 5' 1\" (1.549 m)      BMI:  There is no height or weight on file to calculate BMI. Estimated body mass index is 28.53 kg/m² as calculated from the following:    Height as of 5/31/22: 5' 1\" (1.549 m). Weight as of 5/31/22: 151 lb (68.5 kg). body mass index is unknown because there is no height or weight on file. Preoperative Testing: These are the current and completed labs:  CBC:   Lab Results   Component Value Date    WBC 6.5 03/26/2021    RBC 4.61 03/26/2021    HGB 15.1 03/26/2021    HCT 45.7 03/26/2021    MCV 99.1 03/26/2021    RDW 12.4 03/26/2021     03/26/2021     CMP:   Lab Results   Component Value Date     03/26/2021    K 4.0 03/26/2021     03/26/2021    CO2 27 03/26/2021    BUN 15 03/26/2021    CREATININE 0.71 03/26/2021    GFRAA >60 03/26/2021    LABGLOM >60 03/26/2021    GLUCOSE 103 03/26/2021    PROT 6.7 02/21/2020    CALCIUM 9.4 03/26/2021    BILITOT 0.37 02/21/2020    ALKPHOS 88 02/21/2020    AST 24 02/21/2020    ALT 31 02/21/2020     POC Tests: No results for input(s): POCGLU, POCNA, POCK, POCCL, POCBUN, POCHEMO, POCHCT in the last 72 hours.   Coags    Lab Results   Component Value Date    PROTIME 10.3 09/14/2013    INR 1.0 09/14/2013    APTT 26.8 50/58/3171     HCG (If Applicable) No results found for: PREGTESTUR, PREGSERUM, HCG, HCGQUANT   ABGs No results found for: PHART, PO2ART, LQN4NXB, ZII8XGS, BEART, Y6FQDVOV   Type & Screen (If Applicable)  No results found for: [place]      [] Sent to Anesthesia for your review: 06/08/22   [] Additional Orders: None     Comments:None   Requests: None    Signed: Prosper Cullen LPN 1/5/9055 7:33 AM

## 2022-06-08 NOTE — PROGRESS NOTES
Patient scheduled for right burtons arthroplasty/ carpal and cubital tunnel release  Consent signed and reviewed  Surgery scheduled for 07/13/22

## 2022-06-10 ENCOUNTER — PATIENT MESSAGE (OUTPATIENT)
Dept: FAMILY MEDICINE CLINIC | Age: 57
End: 2022-06-10

## 2022-06-10 NOTE — TELEPHONE ENCOUNTER
From: Armani Cuevas  To: Dr. Roldan Lui: 6/10/2022 1:25 PM EDT  Subject: Hands    Which Dr do I get hold if to see about medical leave due to my hands. i have been put on light duty but its  and really don't have it. im getting surgery july 13.i just can't do my job with out swelling and pain i have contacted the other Dr just not sure what to do.

## 2022-06-19 DIAGNOSIS — M54.50 CHRONIC BILATERAL LOW BACK PAIN WITHOUT SCIATICA: ICD-10-CM

## 2022-06-19 DIAGNOSIS — G89.29 CHRONIC BILATERAL LOW BACK PAIN WITHOUT SCIATICA: ICD-10-CM

## 2022-06-21 RX ORDER — HYDROCODONE BITARTRATE AND ACETAMINOPHEN 5; 325 MG/1; MG/1
1 TABLET ORAL 2 TIMES DAILY PRN
Qty: 60 TABLET | Refills: 0 | Status: SHIPPED | OUTPATIENT
Start: 2022-06-21 | End: 2022-07-20 | Stop reason: SDUPTHER

## 2022-06-26 DIAGNOSIS — F41.9 ANXIETY: ICD-10-CM

## 2022-06-27 RX ORDER — ALPRAZOLAM 1 MG/1
TABLET ORAL
Qty: 60 TABLET | Refills: 2 | Status: SHIPPED | OUTPATIENT
Start: 2022-06-27 | End: 2022-07-26 | Stop reason: SDUPTHER

## 2022-07-13 ENCOUNTER — ANESTHESIA (OUTPATIENT)
Dept: OPERATING ROOM | Age: 57
End: 2022-07-13
Payer: COMMERCIAL

## 2022-07-13 ENCOUNTER — HOSPITAL ENCOUNTER (OUTPATIENT)
Age: 57
Setting detail: OUTPATIENT SURGERY
Discharge: HOME | End: 2022-07-13
Attending: ORTHOPAEDIC SURGERY | Admitting: ORTHOPAEDIC SURGERY
Payer: COMMERCIAL

## 2022-07-13 ENCOUNTER — ANESTHESIA EVENT (OUTPATIENT)
Dept: OPERATING ROOM | Age: 57
End: 2022-07-13
Payer: COMMERCIAL

## 2022-07-13 VITALS
HEIGHT: 61 IN | SYSTOLIC BLOOD PRESSURE: 122 MMHG | HEART RATE: 57 BPM | RESPIRATION RATE: 16 BRPM | BODY MASS INDEX: 28.13 KG/M2 | DIASTOLIC BLOOD PRESSURE: 58 MMHG | TEMPERATURE: 97.8 F | WEIGHT: 149 LBS | OXYGEN SATURATION: 96 %

## 2022-07-13 DIAGNOSIS — M19.049 HAND ARTHRITIS: Primary | ICD-10-CM

## 2022-07-13 PROCEDURE — 64415 NJX AA&/STRD BRCH PLXS IMG: CPT | Performed by: NURSE ANESTHETIST, CERTIFIED REGISTERED

## 2022-07-13 PROCEDURE — 7100000011 HC PHASE II RECOVERY - ADDTL 15 MIN: Performed by: ORTHOPAEDIC SURGERY

## 2022-07-13 PROCEDURE — 64718 REVISE ULNAR NERVE AT ELBOW: CPT | Performed by: ORTHOPAEDIC SURGERY

## 2022-07-13 PROCEDURE — 2500000003 HC RX 250 WO HCPCS: Performed by: NURSE ANESTHETIST, CERTIFIED REGISTERED

## 2022-07-13 PROCEDURE — 2709999900 HC NON-CHARGEABLE SUPPLY: Performed by: ORTHOPAEDIC SURGERY

## 2022-07-13 PROCEDURE — 25310 TRANSPLANT FOREARM TENDON: CPT | Performed by: ORTHOPAEDIC SURGERY

## 2022-07-13 PROCEDURE — 3600000012 HC SURGERY LEVEL 2 ADDTL 15MIN: Performed by: ORTHOPAEDIC SURGERY

## 2022-07-13 PROCEDURE — 2580000003 HC RX 258: Performed by: ORTHOPAEDIC SURGERY

## 2022-07-13 PROCEDURE — 3600000002 HC SURGERY LEVEL 2 BASE: Performed by: ORTHOPAEDIC SURGERY

## 2022-07-13 PROCEDURE — 7100000010 HC PHASE II RECOVERY - FIRST 15 MIN: Performed by: ORTHOPAEDIC SURGERY

## 2022-07-13 PROCEDURE — 25447 ARTHRP NTRCRP/CRP/MTCR NTRPS: CPT | Performed by: ORTHOPAEDIC SURGERY

## 2022-07-13 PROCEDURE — 6360000002 HC RX W HCPCS: Performed by: NURSE ANESTHETIST, CERTIFIED REGISTERED

## 2022-07-13 PROCEDURE — 64721 CARPAL TUNNEL SURGERY: CPT | Performed by: ORTHOPAEDIC SURGERY

## 2022-07-13 PROCEDURE — 3700000000 HC ANESTHESIA ATTENDED CARE: Performed by: ORTHOPAEDIC SURGERY

## 2022-07-13 PROCEDURE — 6360000002 HC RX W HCPCS: Performed by: ORTHOPAEDIC SURGERY

## 2022-07-13 PROCEDURE — 3700000001 HC ADD 15 MINUTES (ANESTHESIA): Performed by: ORTHOPAEDIC SURGERY

## 2022-07-13 RX ORDER — NALOXONE HYDROCHLORIDE 4 MG/.1ML
1 SPRAY NASAL PRN
Qty: 1 EACH | Refills: 5 | Status: SHIPPED | OUTPATIENT
Start: 2022-07-13 | End: 2022-10-20 | Stop reason: ALTCHOICE

## 2022-07-13 RX ORDER — ROPIVACAINE HYDROCHLORIDE 5 MG/ML
INJECTION, SOLUTION EPIDURAL; INFILTRATION; PERINEURAL
Status: COMPLETED | OUTPATIENT
Start: 2022-07-13 | End: 2022-07-13

## 2022-07-13 RX ORDER — OXYCODONE HYDROCHLORIDE AND ACETAMINOPHEN 5; 325 MG/1; MG/1
1-2 TABLET ORAL EVERY 4 HOURS PRN
Qty: 30 TABLET | Refills: 0 | Status: SHIPPED | OUTPATIENT
Start: 2022-07-13 | End: 2022-08-30

## 2022-07-13 RX ORDER — SODIUM CHLORIDE 0.9 % (FLUSH) 0.9 %
5-40 SYRINGE (ML) INJECTION PRN
Status: DISCONTINUED | OUTPATIENT
Start: 2022-07-13 | End: 2022-07-13 | Stop reason: HOSPADM

## 2022-07-13 RX ORDER — MIDAZOLAM HYDROCHLORIDE 1 MG/ML
INJECTION INTRAMUSCULAR; INTRAVENOUS PRN
Status: DISCONTINUED | OUTPATIENT
Start: 2022-07-13 | End: 2022-07-13 | Stop reason: SDUPTHER

## 2022-07-13 RX ORDER — FENTANYL CITRATE 50 UG/ML
INJECTION, SOLUTION INTRAMUSCULAR; INTRAVENOUS PRN
Status: DISCONTINUED | OUTPATIENT
Start: 2022-07-13 | End: 2022-07-13 | Stop reason: SDUPTHER

## 2022-07-13 RX ORDER — SODIUM CHLORIDE 9 MG/ML
INJECTION, SOLUTION INTRAVENOUS PRN
Status: DISCONTINUED | OUTPATIENT
Start: 2022-07-13 | End: 2022-07-13 | Stop reason: HOSPADM

## 2022-07-13 RX ORDER — BUPIVACAINE HYDROCHLORIDE 5 MG/ML
INJECTION, SOLUTION EPIDURAL; INTRACAUDAL
Status: COMPLETED | OUTPATIENT
Start: 2022-07-13 | End: 2022-07-13

## 2022-07-13 RX ORDER — PROPOFOL 10 MG/ML
INJECTION, EMULSION INTRAVENOUS CONTINUOUS PRN
Status: DISCONTINUED | OUTPATIENT
Start: 2022-07-13 | End: 2022-07-13 | Stop reason: SDUPTHER

## 2022-07-13 RX ORDER — SODIUM CHLORIDE, SODIUM LACTATE, POTASSIUM CHLORIDE, CALCIUM CHLORIDE 600; 310; 30; 20 MG/100ML; MG/100ML; MG/100ML; MG/100ML
INJECTION, SOLUTION INTRAVENOUS CONTINUOUS
Status: DISCONTINUED | OUTPATIENT
Start: 2022-07-13 | End: 2022-07-13 | Stop reason: HOSPADM

## 2022-07-13 RX ORDER — SODIUM CHLORIDE 0.9 % (FLUSH) 0.9 %
5-40 SYRINGE (ML) INJECTION EVERY 12 HOURS SCHEDULED
Status: DISCONTINUED | OUTPATIENT
Start: 2022-07-13 | End: 2022-07-13 | Stop reason: HOSPADM

## 2022-07-13 RX ADMIN — SODIUM CHLORIDE, POTASSIUM CHLORIDE, SODIUM LACTATE AND CALCIUM CHLORIDE: 600; 310; 30; 20 INJECTION, SOLUTION INTRAVENOUS at 12:43

## 2022-07-13 RX ADMIN — BUPIVACAINE HYDROCHLORIDE 20 ML: 5 INJECTION, SOLUTION EPIDURAL; INTRACAUDAL; PERINEURAL at 12:43

## 2022-07-13 RX ADMIN — PROPOFOL 150 MCG/KG/MIN: 10 INJECTION, EMULSION INTRAVENOUS at 13:27

## 2022-07-13 RX ADMIN — MIDAZOLAM HYDROCHLORIDE 4 MG: 1 INJECTION, SOLUTION INTRAMUSCULAR; INTRAVENOUS at 12:43

## 2022-07-13 RX ADMIN — ROPIVACAINE HYDROCHLORIDE 10 ML: 5 INJECTION EPIDURAL; INFILTRATION; PERINEURAL at 12:43

## 2022-07-13 RX ADMIN — FENTANYL CITRATE 50 MCG: 50 INJECTION, SOLUTION INTRAMUSCULAR; INTRAVENOUS at 12:43

## 2022-07-13 RX ADMIN — FENTANYL CITRATE 50 MCG: 50 INJECTION, SOLUTION INTRAMUSCULAR; INTRAVENOUS at 13:27

## 2022-07-13 RX ADMIN — Medication 2000 MG: at 13:31

## 2022-07-13 ASSESSMENT — PAIN - FUNCTIONAL ASSESSMENT: PAIN_FUNCTIONAL_ASSESSMENT: 0-10

## 2022-07-13 ASSESSMENT — PAIN DESCRIPTION - DESCRIPTORS: DESCRIPTORS: DISCOMFORT

## 2022-07-13 NOTE — H&P
History and Physical    Patient's Name/Date of Birth: Claudia Shah / 1965, 62 y.o. yo    Date: 2022     PCP: Juana Laguna MD     History of Present Illness: This 54-year-old female is presenting with refractory pain localized to the base of her right thumb as well as numbness involving all of the fingers. Family history negative    Past Medical History:   Diagnosis Date    Alcohol abuse     Carpal tunnel syndrome, bilateral     Chronic back pain     Depression with anxiety     HGSIL (high grade squamous intraepithelial lesion) on Pap smear     Insomnia     Migraine     Recreational drug use     Marijuana.  Tobacco abuse       Past Surgical History:   Procedure Laterality Date     SECTION      COLPOSCOPY  2002    With laser cone biopsy of the cervix.  TUBAL LIGATION        Allergies: Naproxen and Requip [ropinirole]     No current facility-administered medications for this encounter. Current Outpatient Medications   Medication Sig Dispense Refill    ALPRAZolam (XANAX) 1 MG tablet take 1 tablet by mouth twice a day 60 tablet 2    HYDROcodone-acetaminophen (NORCO) 5-325 MG per tablet Take 1 tablet by mouth 2 times daily as needed for Pain for up to 30 days. 60 tablet 0    triamcinolone (KENALOG) 0.1 % cream Apply topically 2 times daily.  80 g 2    midodrine (PROAMATINE) 5 MG tablet Take 1 tablet by mouth 3 times daily 90 tablet 3    simvastatin (ZOCOR) 20 MG tablet Take 1 tablet by mouth nightly 90 tablet 1    meloxicam (MOBIC) 15 MG tablet Take 1 tablet by mouth daily 90 tablet 1    traZODone (DESYREL) 50 MG tablet Take 1 tablet by mouth nightly 30 tablet 11    aspirin 81 MG tablet Take 81 mg by mouth daily         Social History     Tobacco Use    Smoking status: Current Every Day Smoker     Packs/day: 0.20     Years: 37.00     Pack years: 7.40     Types: Cigarettes     Last attempt to quit: 2018     Years since quittin.3    Smokeless tobacco: Never Used   Substance Use Topics    Alcohol use: Yes     Alcohol/week: 0.0 standard drinks     Comment: occasionally        Review of Systems:  Positive for numbness and tingling all fingers  Other than stated above in the HPI is negative      Physical exam:     General appearance: no acute distress  Head: NAD  Neck: supple  Lungs: No audible wheezing, respiratory rate normal  Heart: Perfusion to all extremeties  Extremities: Tender at first [facility] joint with a positive CMC grind test right wrist positive right thumb compression test and right elbow flexion test    Assessment:  1. Right first CMC arthritis  2. Right carpal tunnel syndrome  3. Right cubital tunnel syndrome        Plan:  1. Right Taylor's arthroplasty  2. Right carpal tunnel release  3.   Right ulnar nerve decompression      [unfilled]            MYESHA Roldan MD,MD 7/13/2022 at 11:39 AM

## 2022-07-13 NOTE — ANESTHESIA POSTPROCEDURE EVALUATION
Department of Anesthesiology  Postprocedure Note    Patient: Iesha Parks  MRN: [id number]  [place]: 1965  Date of evaluation: 7/13/2022      Procedure Summary     Date: 07/13/22 Room / Location: [address] [place] [address]    Anesthesia Start: 1326 Anesthesia Stop: 0058    Procedures:       Right Carpal & Cubital Tunnel Releases with ulnar nerve transposition  (Right )      Right Taylor's ARTHROPLASTY -123 (Right ) Diagnosis:       Carpal tunnel syndrome on right      Cubital tunnel syndrome, right      Arthritis of carpometacarpal (CMC) joint of right thumb      (Carpal tunnel syndrome on right [G56.01])      (Cubital tunnel syndrome, right [G56.21])      (Arthritis of carpometacarpal (CMC) joint of right thumb [M18.11])    Surgeons: Amita Brady MD Responsible Provider: SEAN Richardson CRNA    Anesthesia Type: regional, MAC ASA Status: 2          Anesthesia Type: No value filed.     Robin Phase I: Robin Score: 10    Robin Phase II: Robin Score: 9      Anesthesia Post Evaluation    Patient location during evaluation: PACU  Patient participation: complete - patient participated  Level of consciousness: awake and alert  Pain score: 0  Airway patency: patent  Nausea & Vomiting: no nausea and no vomiting  Complications: no  Cardiovascular status: blood pressure returned to baseline and hemodynamically stable  Respiratory status: acceptable  Hydration status: euvolemic

## 2022-07-13 NOTE — OP NOTE
Operative Note      Patient: Mere Pop   YOB: 1965   MRN: [id number]     Date of Procedure: 7/13/2022      Pre-Op Diagnosis: Carpal tunnel syndrome on right [G56.01]  Cubital tunnel syndrome, right [G56.21]  Arthritis of carpometacarpal (CMC) joint of right thumb [M18.11]      Post-Op Diagnosis: Same       Procedure(s):  Procedure(s):  Right Carpal & Cubital Tunnel Releases with ulnar nerve transposition   Right Taylor's ARTHROPLASTY -123      Surgeon(s):  Sara Taylor MD    Assistant:   None    Anesthesia: General      Estimated Blood Loss (mL): Minimal    Complications: None    Specimens: None    Implants:None      Drains: None    Findings: Significant thickening of the overlying transverse carpal ligament. Significant loss of articular surface of the first CMC joint. Of the ulnar nerve noted was that there was significant thickening of the overlying fascia with evidence of subluxation of the ulnar nerve. Detailed Description of Procedure:   Under supraclavicular regional block the patient's right upper extremity was prepped and draped for the proposed procedure. Tourniquet applied to right proximal humerus was inflated 250 mmHg for examination of the lymph application of an Esmarch bandage. Initially attention was directed to the carpal tunnel whereby small nodule incision was made centered over the carpal tunnel. Hemostasis was obtained with use of bipolar cautery. Palmar fascia incised in line with the skin incision followed by sharply incising the ligament thus entering the carpal tunnel. The distal aspect of the ligament was released under direct visualization with the use of tenotomy scissors. The proximal aspect was deleted and then released with a scalpel directed along the grooved nerve protector. The wound was irrigated with normal sterile solution and closed with a 4-0 nylon establish in a simple suture fashion.     Attention was then directed to the first [facility] joint where a gentle curved incision made at the base of the thumb. Superficial radial nerve was then defined and preserved for the operative procedure. Fascia was incised with a scalpel followed with incision of the capsule with use of cautery. The trapezium was then removed en bloc. The SC joint was visualized and noted that it was still preserved. The FCR tendon was notified at the base of the joint. Tension was placed upon [place] with muscular tendinous junction was identified in the mid forearm level and it was incised. FCR was then retracted distally best performing a tendon transfer. FCR was incised longitudinally thus creating 2 limbs. At the metacarpals and broached in an oblique fashion with a drill bit whereby one of the limbs of the FCR was advanced through it with a Televerde tendon passer. The limb of the FCR was secured to the volar capsule with a 2-0 Tycron. The 2 limbs of the FCR were then rolled upon itself secured with a 3-0 Vicryl placed within the void thus creating interposition arthroplasty. Wound irrigated with normal same solution. Capsule closed with 3-0 Vicryl followed with subcutaneous closure with 3-0 Vicryl and closure of skin with a 4-0 nylon establish in a running horizontal mattress fashion. Attention was then directed to the cubital tunnel whereby a posterior medial stitch was made. Hemostasis was obtained with use of bipolar cautery. Fascia incised identified the ulnar nerve. Complete decompression was performed and noted was the nerve was subluxed anteriorly. Discussed elected proceed with a formal anterior transposition. A vessel loop was used to aid in the dissection of the ulnar nerve from the rounding structures. Intermuscular septum was incised and left attached distally thus creating a sling to maintain the ulnar nerve anteriorly. The fascial sling was secured to the pronator fascia with a 2-0 Vicryl.   It was elected to reinforce the new tunnel with a 2-0 Vicryl site was to the subcutaneous level in a mattress fashion. I was placed through range of motion noting no secondary points of constriction. Wound irrigated with #solution. Wound closed subcutaneous level 3-0 Vicryl followed with closure of skin with a 4-0 Monocryl established in a running subcuticular fashion. The wound was reinforced with Steri-Strips. Sterile bulky had a dressing was applied with a dressing extending to the elbow level. Thumb spica was established.       Electronically signed by Jacky Flores MD on 2/24/2021 at 1:13 PM

## 2022-07-13 NOTE — ANESTHESIA PROCEDURE NOTES
Peripheral Block    Patient location during procedure: pre-op  Reason for block: post-op pain management, primary anesthetic and at surgeon's request  Start time: 7/13/2022 12:43 PM  End time: 7/13/2022 12:48 PM  Staffing  Performed: resident/CRNA   Resident/CRNA: SEAN Lane CRNA  Preanesthetic Checklist  Completed: patient identified, IV checked, site marked, risks and benefits discussed, surgical/procedural consents, equipment checked, pre-op evaluation, timeout performed, anesthesia consent given, oxygen available, monitors applied/VS acknowledged and blood product R/B/A discussed and consented  Peripheral Block   Patient position: supine  Prep: ChloraPrep  Provider prep: mask and sterile gloves  Patient monitoring: cardiac monitor, continuous pulse ox, frequent blood pressure checks, IV access, oxygen and responsive to questions  Block type: Brachial plexus  Supraclavicular  Laterality: right  Injection technique: single-shot  Guidance: nerve stimulator and ultrasound guided  Local infiltration: lidocaine  Infiltration strength: 1 %  Local infiltration: lidocaine  Dose: 5 mL    Needle   Needle type: insulated echogenic nerve stimulator needle   Needle gauge: 21 G  Needle localization: ultrasound guidance and nerve stimulator  Needle length: 11 cm  Assessment   Injection assessment: negative aspiration for heme, no paresthesia on injection, local visualized surrounding nerve on ultrasound and no intravascular symptoms  Paresthesia pain: none  Slow fractionated injection: yes  Hemodynamics: stable  Real-time US image taken/store: yes  Outcomes: uncomplicated and patient tolerated procedure well    Additional Notes  Immediately prior to procedure a \"time out\" was called to verify the correct patient, allergies, laterality, procedure and equipment. Time out performed with ARMAND Beltre RN    Local Anesthetic: 0.5 %  Bupivacaine with Ropivacaine 0.5%   Amount: 30 ml total in 5 ml increments after negative aspiration each time.         Medications Administered  Bupivacaine (MARCAINE) PF injection 0.5%, 20 mL  ropivacaine (NAROPIN) injection 0.5%, 10 mL

## 2022-07-13 NOTE — ANESTHESIA PRE PROCEDURE
Department of Anesthesiology  Preprocedure Note       Name:  Claudia Mittal   Age:  62 y.o.  :  1965                                          MRN:  [id number]         Date:  2022      Surgeon: Rmaana Henry):  Lonnie Foster MD    Procedure: Procedure(s):  Right Carpal & Cubital Tunnel Releases  Right Taylor's ARTHROPLASTY -123    Medications prior to admission:   Prior to Admission medications    Medication Sig Start Date End Date Taking? Authorizing Provider   ALPRAZolam Sukhdev Peaks) 1 MG tablet take 1 tablet by mouth twice a day 22  Shanon Dinh MD   HYDROcodone-acetaminophen (NORCO) 5-325 MG per tablet Take 1 tablet by mouth 2 times daily as needed for Pain for up to 30 days. 22  Shanon Dinh MD   triamcinolone (KENALOG) 0.1 % cream Apply topically 2 times daily.  22   Shanon Dinh MD   midodrine (PROAMATINE) 5 MG tablet Take 1 tablet by mouth 3 times daily 22   Shanon Dinh MD   simvastatin (ZOCOR) 20 MG tablet Take 1 tablet by mouth nightly 22   Shanon Dinh MD   meloxicam EZEQUIEL FLOWERS [facility]) 15 MG tablet Take 1 tablet by mouth daily 22   Shanon Dinh MD   traZODone (DESYREL) 50 MG tablet Take 1 tablet by mouth nightly 22   Shanon Dinh MD   aspirin 81 MG tablet Take 81 mg by mouth daily    Historical Provider, MD       Current medications:    Current Facility-Administered Medications   Medication Dose Route Frequency Provider Last Rate Last Admin    lactated ringers infusion   IntraVENous Continuous Lonnie Foster MD        sodium chloride flush 0.9 % injection 5-40 mL  5-40 mL IntraVENous 2 times per day Lonnie Foster MD        sodium chloride flush 0.9 % injection 5-40 mL  5-40 mL IntraVENous PRN Lonnie Foster MD        0.9 % sodium chloride infusion   IntraVENous PRN Lonnie Foster MD        ceFAZolin (ANCEF) 2000 mg in sterile water 20 mL IV syringe  2,000 mg IntraVENous On Call to Cody Neves MD Allergies: Allergies   Allergen Reactions    Naproxen      Nausea/shaky feeling    Requip [Ropinirole] Other (See Comments)     Hypotension and syncope. Problem List:    Patient Active Problem List   Diagnosis Code    Depression F32. A    Anxiety F41.9    Chronic back pain M54.9, G89.29    Foot pain M79.673    Lesion of plantar nerve G57.60    Enthesopathy M77.9    Tobacco abuse Z72.0    HGSIL on Pap smear of cervix R87.613    HGSIL on cytologic smear of cervix R87.613    Hyperopia of both eyes with astigmatism and presbyopia H52.03, H52.203, H52.4    Chest pain R07.9       Past Medical History:        Diagnosis Date    Alcohol abuse     Carpal tunnel syndrome, bilateral     Chronic back pain     Depression with anxiety     HGSIL (high grade squamous intraepithelial lesion) on Pap smear     Insomnia     Migraine     Recreational drug use     Marijuana.  Tobacco abuse        Past Surgical History:        Procedure Laterality Date     SECTION  1992    COLPOSCOPY  2002    With laser cone biopsy of the cervix.  TUBAL LIGATION         Social History:    Social History     Tobacco Use    Smoking status: Current Every Day Smoker     Years: 37.00     Types: Cigarettes     Last attempt to quit: 2018     Years since quittin.3    Smokeless tobacco: Never Used    Tobacco comment: 2 cigarettes per day   Substance Use Topics    Alcohol use: Yes     Alcohol/week: 0.0 standard drinks     Comment: occasionally                                Ready to quit: Not Answered  Counseling given: Not Answered  Comment: 2 cigarettes per day      Vital Signs (Current): There were no vitals filed for this visit.                                            BP Readings from Last 3 Encounters:   22 128/70   22 133/82   22 118/64       NPO Status: Time of last liquid consumption:                         Time of last solid consumption:  Airway: Mallampati: II  TM distance: >3 FB   Neck ROM: full  Mouth opening: > = 3 FB   Dental:          Pulmonary:Negative Pulmonary ROS and normal exam                               Cardiovascular:Negative CV ROS  Exercise tolerance: no interval change,                     Neuro/Psych:   Negative Neuro/Psych ROS              GI/Hepatic/Renal: Neg GI/Hepatic/Renal ROS            Endo/Other: Negative Endo/Other ROS                    Abdominal:             Vascular: negative vascular ROS. Other Findings:           Anesthesia Plan      regional and MAC     ASA 2             Anesthetic plan and risks discussed with patient. Use of blood products discussed with patient whom consented to blood products.    Plan discussed with surgical team.          Post-op pain plan if not by surgeon: single peripheral nerve block            SENA Nayak - CRNA   7/13/2022

## 2022-07-20 DIAGNOSIS — G89.29 CHRONIC BILATERAL LOW BACK PAIN WITHOUT SCIATICA: ICD-10-CM

## 2022-07-20 DIAGNOSIS — M54.50 CHRONIC BILATERAL LOW BACK PAIN WITHOUT SCIATICA: ICD-10-CM

## 2022-07-20 RX ORDER — TRIAMCINOLONE ACETONIDE 1 MG/G
CREAM TOPICAL
Qty: 80 G | Refills: 2 | Status: SHIPPED | OUTPATIENT
Start: 2022-07-20 | End: 2022-09-25 | Stop reason: SDUPTHER

## 2022-07-20 RX ORDER — HYDROCODONE BITARTRATE AND ACETAMINOPHEN 5; 325 MG/1; MG/1
1 TABLET ORAL 2 TIMES DAILY PRN
Qty: 60 TABLET | Refills: 0 | Status: SHIPPED | OUTPATIENT
Start: 2022-07-20 | End: 2022-08-19 | Stop reason: SDUPTHER

## 2022-07-20 NOTE — TELEPHONE ENCOUNTER
Sherley Martinez called requesting a refill of the below medication which has been pended for you:   [place] from [place], and [place] reviewed. NORCO 5-325 mg last filled 6/21/22 #60/30 days  Requested Prescriptions     Pending Prescriptions Disp Refills    triamcinolone (KENALOG) 0.1 % cream 80 g 2     Sig: Apply topically 2 times daily. HYDROcodone-acetaminophen (NORCO) 5-325 MG per tablet 60 tablet 0     Sig: Take 1 tablet by mouth 2 times daily as needed for Pain for up to 30 days. Last Appointment Date: 5/31/2022  Next Appointment Date: Visit date not found    Allergies   Allergen Reactions    Naproxen      Nausea/shaky feeling    Requip [Ropinirole] Other (See Comments)     Hypotension and syncope.

## 2022-07-26 DIAGNOSIS — F41.9 ANXIETY: ICD-10-CM

## 2022-07-27 ENCOUNTER — OFFICE VISIT (OUTPATIENT)
Dept: ORTHOPEDIC SURGERY | Age: 57
End: 2022-07-27
Payer: COMMERCIAL

## 2022-07-27 VITALS
DIASTOLIC BLOOD PRESSURE: 80 MMHG | HEART RATE: 72 BPM | OXYGEN SATURATION: 97 % | SYSTOLIC BLOOD PRESSURE: 122 MMHG | BODY MASS INDEX: 28.13 KG/M2 | WEIGHT: 149 LBS | HEIGHT: 61 IN

## 2022-07-27 DIAGNOSIS — Z98.890 STATUS POST CARPAL TUNNEL RELEASE: Primary | ICD-10-CM

## 2022-07-27 PROCEDURE — L3908 WHO COCK-UP NONMOLDE PRE OTS: HCPCS | Performed by: ORTHOPAEDIC SURGERY

## 2022-07-27 PROCEDURE — 99024 POSTOP FOLLOW-UP VISIT: CPT | Performed by: ORTHOPAEDIC SURGERY

## 2022-07-27 RX ORDER — ALPRAZOLAM 1 MG/1
TABLET ORAL
Qty: 60 TABLET | Refills: 0 | Status: SHIPPED | OUTPATIENT
Start: 2022-07-27 | End: 2022-08-30 | Stop reason: SDUPTHER

## 2022-07-27 NOTE — TELEPHONE ENCOUNTER
Notify patient that the norco and xanax are consider a high risk combination. Should consider weaning down and eventually weaning off of the xanax and changing to a different medication for treatment of anxiety. There are a lot of other treatment options for treatment of anxiety which would be lower risk than her current combination of therapy. Would recommend she discuss with Dr. Omaira Castro at her next follow up visit. I will send in her current script, but do think this should be discussed at her follow up.

## 2022-07-27 NOTE — LETTER
[address] department of [place] [address]  Phone: [phone]  Fax: [phone]    George Bailey MD        July 27, 2022     Patient: Juliet Rivero   YOB: 1965   Date of Visit: 7/27/2022       To Whom It May Concern: It is my medical opinion that Jania Mack should remain out of work until 09/01/22. If you have any questions or concerns, please don't hesitate to call.     Sincerely,        George Bailey MD

## 2022-07-28 NOTE — PROGRESS NOTES
Take 81 mg by mouth daily      ALPRAZolam (XANAX) 1 MG tablet take 1 tablet by mouth twice a day 60 tablet 0    oxyCODONE-acetaminophen (PERCOCET) 5-325 MG per tablet Take 1-2 tablets by mouth every 4 hours as needed for Pain. (Patient not taking: Reported on 2022) 30 tablet 0    naloxone 4 MG/0.1ML LIQD nasal spray 1 spray by Nasal route as needed for Opioid Reversal (Patient not taking: Reported on 2022) 1 each 5     No current facility-administered medications for this visit. Social History     Tobacco Use    Smoking status: Former     Years: 37.00     Types: Cigarettes     Quit date: 2018     Years since quittin.4    Smokeless tobacco: Never    Tobacco comments:     2 cigarettes per day   Substance Use Topics    Alcohol use: Yes     Alcohol/week: 0.0 standard drinks     Comment: occasionally        Review of Systems:  Negative  Other than stated above in the HPI is negative      Physical exam:     General appearance: no acute distress  Head: NAD  Neck: supple  Lungs: No audible wheezing, respiratory rate normal  Heart: Perfusion to all extremeties  Extremities: Examination of her right arm revealed the incisions had healed well. There are no signs of infection. She demonstrated full motion of her elbow wrist and digits. She was assessed to be neurovascular intact. Assessment:  Patient is doing very well status post right virgins arthroplasty, carpal tunnel decompression and ulnar nerve decompression with anterior subcutaneous transposition. Plan:  Patient was instructed in scar modalities. She was fitted with a thumb spica splint. She will be reassessed again in 4 weeks to review her functional status. Procedures    Who cock-up nonmolde pre ots     Patient was prescribed a Procare Comfort Wrist and Thumb brace. The right wrist will require stabilization / immobilization from this semi-rigid / rigid orthosis to improve their function.   The orthosis will assist in protecting the affected area, provide functional support and facilitate healing. The patient was educated and fit by a healthcare professional with expert knowledge and specialization in brace application while under the direct supervision of the treating physician. Verbal and written instructions for the use of and application of this item were provided. They were instructed to contact the office immediately should the brace result in increased pain, decreased sensation, increased swelling or worsening of the condition.                Angela Dixon MD,MD 7/28/2022 at 1:03 PM

## 2022-08-19 DIAGNOSIS — M54.50 CHRONIC BILATERAL LOW BACK PAIN WITHOUT SCIATICA: ICD-10-CM

## 2022-08-19 DIAGNOSIS — G89.29 CHRONIC BILATERAL LOW BACK PAIN WITHOUT SCIATICA: ICD-10-CM

## 2022-08-19 RX ORDER — HYDROCODONE BITARTRATE AND ACETAMINOPHEN 5; 325 MG/1; MG/1
1 TABLET ORAL 2 TIMES DAILY PRN
Qty: 60 TABLET | Refills: 0 | Status: SHIPPED | OUTPATIENT
Start: 2022-08-19 | End: 2022-09-19 | Stop reason: SDUPTHER

## 2022-08-19 NOTE — TELEPHONE ENCOUNTER
Krista Arreaga called requesting a refill of the below medication which has been pended for you: O[place] from [place], and [place] reviewed. NORCO 5-325 mg last filled 7/21/22 #60/30 days    Requested Prescriptions     Pending Prescriptions Disp Refills    HYDROcodone-acetaminophen (NORCO) 5-325 MG per tablet 60 tablet 0     Sig: Take 1 tablet by mouth 2 times daily as needed for Pain for up to 30 days. Last Appointment Date: 5/31/2022  Next Appointment Date: Visit date not found    Allergies   Allergen Reactions    Naproxen      Nausea/shaky feeling    Requip [Ropinirole] Other (See Comments)     Hypotension and syncope.

## 2022-08-30 ENCOUNTER — PATIENT MESSAGE (OUTPATIENT)
Dept: FAMILY MEDICINE CLINIC | Age: 57
End: 2022-08-30

## 2022-08-30 DIAGNOSIS — F41.9 ANXIETY: ICD-10-CM

## 2022-08-30 RX ORDER — ALPRAZOLAM 1 MG/1
TABLET ORAL
Qty: 60 TABLET | Refills: 0 | Status: SHIPPED | OUTPATIENT
Start: 2022-08-30 | End: 2022-09-25 | Stop reason: SDUPTHER

## 2022-08-30 NOTE — TELEPHONE ENCOUNTER
From: Veronica Goode  To: Dr. Rosibel Anders: 8/30/2022 1:28 PM EDT  Subject: Refill not on my chart     Need my alprazolam (xanax) refilled

## 2022-08-31 ENCOUNTER — OFFICE VISIT (OUTPATIENT)
Dept: ORTHOPEDIC SURGERY | Age: 57
End: 2022-08-31
Payer: COMMERCIAL

## 2022-08-31 VITALS
HEART RATE: 88 BPM | DIASTOLIC BLOOD PRESSURE: 72 MMHG | SYSTOLIC BLOOD PRESSURE: 137 MMHG | WEIGHT: 149 LBS | BODY MASS INDEX: 28.13 KG/M2 | HEIGHT: 61 IN

## 2022-08-31 DIAGNOSIS — Z98.890 STATUS POST CARPAL TUNNEL RELEASE: Primary | ICD-10-CM

## 2022-08-31 DIAGNOSIS — M19.049 CMC ARTHRITIS: ICD-10-CM

## 2022-08-31 PROCEDURE — 99024 POSTOP FOLLOW-UP VISIT: CPT | Performed by: ORTHOPAEDIC SURGERY

## 2022-08-31 PROCEDURE — L3917 METACARP FX ORTHOSIS PRE CST: HCPCS | Performed by: ORTHOPAEDIC SURGERY

## 2022-08-31 NOTE — LETTER
[address] department of [place] [address]  Phone: [phone]  Fax: [phone]    Niya Leon MD        August 31, 2022     Patient: Claudia Mittal   YOB: 1965   Date of Visit: 8/31/2022       To Whom It May Concern: It is my medical opinion that Viola Clifford should remain out of work until 10/13/22. If you have any questions or concerns, please don't hesitate to call.     Sincerely,        Niya Leon MD

## 2022-08-31 NOTE — PROGRESS NOTES
History and Physical    Patient's Name/Date of Birth: Yury Farah / 1965, 62 y.o. yo    Date: August 31, 2022     PCP: Tucker Bradley MD     History of Present Illness:    Patient now presents 7 weeks status post right Taylor's arthroplasty, carpal tunnel and ulnar nerve decompression with anterior subcutaneous transposition, date of surgery 7/13/2022. Patient reports that she is doing fairly well has been taking the brace at all times but notes intermittent pain to the radial aspect of her wrist a couple of times a day which is at a level of 10. She has no pain at the present time or at rest.    Family history negative    Past Medical History:   Diagnosis Date    Alcohol abuse     Carpal tunnel syndrome, bilateral     Chronic back pain     Depression with anxiety     HGSIL (high grade squamous intraepithelial lesion) on Pap smear     Insomnia     Migraine     Recreational drug use     Marijuana. Tobacco abuse       Past Surgical History:   Procedure Laterality Date    ARTHROPLASTY Right 7/13/2022    Right Taylor's ARTHROPLASTY -123 performed by Fazal Murphy MD at [place] [address] Right 7/13/2022    Right Carpal & Cubital Tunnel Releases with ulnar nerve transposition  performed by Fazal Murphy MD at [address] [place] [address]  09/11/2002    With laser cone biopsy of the cervix. TUBAL LIGATION        Allergies: Naproxen and Requip [ropinirole]     Current Outpatient Medications   Medication Sig Dispense Refill    ALPRAZolam (XANAX) 1 MG tablet take 1 tablet by mouth twice a day 60 tablet 0    HYDROcodone-acetaminophen (NORCO) 5-325 MG per tablet Take 1 tablet by mouth 2 times daily as needed for Pain for up to 30 days. 60 tablet 0    triamcinolone (KENALOG) 0.1 % cream Apply topically 2 times daily.  80 g 2    naloxone 4 MG/0.1ML LIQD nasal spray 1 spray by Nasal route as needed for Opioid Reversal (Patient not taking: Reported on 7/27/2022) 1 each 5    midodrine (PROAMATINE) 5 MG tablet Take 1 tablet by mouth 3 times daily 90 tablet 3    simvastatin (ZOCOR) 20 MG tablet Take 1 tablet by mouth nightly 90 tablet 1    meloxicam (MOBIC) 15 MG tablet Take 1 tablet by mouth daily 90 tablet 1    traZODone (DESYREL) 50 MG tablet Take 1 tablet by mouth nightly 30 tablet 11    aspirin 81 MG tablet Take 81 mg by mouth daily       No current facility-administered medications for this visit. Social History     Tobacco Use    Smoking status: Former     Years: 37.00     Types: Cigarettes     Quit date: 2018     Years since quittin.5    Smokeless tobacco: Never    Tobacco comments:     2 cigarettes per day   Substance Use Topics    Alcohol use: Yes     Alcohol/week: 0.0 standard drinks     Comment: occasionally        Review of Systems:  Negative  Other than stated above in the HPI is negative      Physical exam:     General appearance: no acute distress  Head: NAD  Neck: supple  Lungs: No audible wheezing, respiratory rate normal  Heart: Perfusion to all extremeties  Extremities: Examination of her right arm revealed that there was minimal swelling to her hand. She had slight tenderness to the radial styloid. She had some limitation with regards to motion able to come within 3 cm of the distal palmar flexion crease. She was assessed to be neurovascular intact. Assessment:  Patient is doing relatively well status post right Taylor's arthroplasty, carpal tunnel release and ulnar nerve decompression with anterior subcutaneous transposition. She however has some limitation with regards to motion        Plan:  Recommendation was a formal course of occupational therapy to address edema control and range of motion. She will be reassessed again in 6 weeks. She was also provided with a CMC wrap to use on an as-needed basis.       Procedures    [facility] Restriction Splint Brace     Patient was prescribed a CMC Joint brace  The right CMC joint will require stabilization / immobilization from this semi-rigid / rigid orthosis to improve their function. The orthosis will assist in protecting the affected area, provide functional support and facilitate healing. The patient was educated and fit by a healthcare professional with expert knowledge and specialization in brace application while under the direct supervision of the treating physician. Verbal and written instructions for the use of and application of this item were provided. They were instructed to contact the office immediately should the brace result in increased pain, decreased sensation, increased swelling or worsening of the condition.                [address] Chloe Floyd MD,MD 8/31/2022 at 11:44 AM

## 2022-09-02 ENCOUNTER — HOSPITAL ENCOUNTER (OUTPATIENT)
Dept: OCCUPATIONAL THERAPY | Age: 57
Setting detail: THERAPIES SERIES
Discharge: HOME | End: 2022-09-02
Payer: COMMERCIAL

## 2022-09-02 PROCEDURE — 97530 THERAPEUTIC ACTIVITIES: CPT

## 2022-09-02 PROCEDURE — 97166 OT EVAL MOD COMPLEX 45 MIN: CPT

## 2022-09-02 ASSESSMENT — PAIN DESCRIPTION - ORIENTATION: ORIENTATION: RIGHT

## 2022-09-02 ASSESSMENT — PAIN DESCRIPTION - LOCATION: LOCATION: HAND

## 2022-09-02 ASSESSMENT — 9 HOLE PEG TEST
TEST_RESULT: IMPAIRED
TESTTIME_SECONDS: 17
TESTTIME_SECONDS: 63
TEST_RESULT: FUNCTIONAL

## 2022-09-02 ASSESSMENT — PAIN DESCRIPTION - FREQUENCY: FREQUENCY: INTERMITTENT

## 2022-09-02 ASSESSMENT — PAIN DESCRIPTION - DESCRIPTORS: DESCRIPTORS: SHARP

## 2022-09-02 NOTE — PROGRESS NOTES
Occupational Therapy  Occupational Therapy Initial Assessment  Date:  2022    Patient Name: Luis Fernando Cary  MRN: [id number]     :  1965     Treatment Diagnosis: Status post carpal tunnel release; CMC arthritis    Subjective:   General  Chart Reviewed: Yes  Patient assessed for rehabilitation services?: Yes  Family/Caregiver Present: No  Diagnosis: Status post carpal tunnel release; CMC arthritis  Referring Provider (secondary): Cintia Anderson MD  OT Visit Information  Onset Date: 22  Subjective  Dominant Hand: : Right  Pain Assessment  Pain Location: Hand (thumb/radial aspect of wrist)  Pain Orientation: Right  Pain Descriptors: Sharp  Pain Frequency: Intermittent    Social History:   Social History  Lives With: Son  Type of Home: House    Functional Status:  Functional Status  Prior level of function: Independent  Occupation: On disability  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     Objective:     Activity Tolerance  Activity Tolerance: Patient tolerated evaluation without incident  LUE AROM (degrees)  LUE AROM : WFL  L Elbow Flexion 0-145: 150°  L Elbow Extension 145-0: 0°  L Forearm Pron 0-90: 90°  L Forearm Supination  0-90: 80°  L Wrist Flexion 0-80: 80°  L Wrist Extension 0-70: 75°  L Wrist Radial Deviation 0-20: 20°  L Wrist Ulnar Deviation 0-45: 43°  RUE PROM (degrees)  RUE PROM: Exceptions  R Elbow Extension 145-0: 0°  R Wrist Flex 0-80: 55°  R Wrist Ext 0-70: 63°  R Wrist Radial Deviation 0-20: 20°  R Wrist Ulnar Deviation 0-45: 26°  RUE AROM (degrees)  RUE AROM : Exceptions  R Elbow Flexion 0-145: 145°  R Elbow Extension 145-0: -15°  R Forearm Pron 0-90: 90°  R Forearm Supination  0-90: 80°  R Wrist Flexion 0-80: 39°  R Wrist Extension 0-70: 45°  R Wrist Radial Deviation 0-20: 15°  R Wrist Ulnar Deviation 0-45: 20°  Right Hand PROM (degrees)  Right Hand PROM: WFL  R Thumb Radial ADduction/ABduction 0-55: 60°  R Thumb Palmar ADduction/ABduction 0-45: 45°  Right Hand AROM (degrees)  Right Hand AROM: Exceptions  R Thumb Radial ADduction/ABduction 0-55: 45°  R Thumb Palmar ADduction/ABduction 0-45: 30°  Other: Pt unable to make a full fist with R hand (D3= 3cm from palm of hand); pt could only complete thumb opposition to D2 and medial aspect of D3, unable to touch D4 & D5  LUE Strength  L Shoulder Flex: 5/5  L Shoulder Ext: 5/5  L Shoulder ABduction: 5/5  L Shoulder ADduction: 5/5  L Elbow Flex: 4+/5  L Elbow Ext: 4+/5  L Wrist Flex: 5/5  L Wrist Ext: 5/5  L Wrist Radial Deviation: 5/5  L Wrist Ulnar Deviation: 5/5  RUE Strength  Gross RUE Strength: Exceptions to [facility]  R Shoulder Flex: 5/5  R Shoulder Ext: 5/5  R Shoulder ABduction: 5/5  R Shoulder ADduction: 5/5  R Elbow Flex: 4/5  R Elbow Ext: 4/5  R Wrist Flex: 4-/5  R Wrist Ext: 4-/5  R Wrist Radial Deviation: 4-/5  R Wrist Ulnar Deviation: 4-/5  Hand Dominance  Hand Dominance: Right  Left Hand Strength -  (lbs)  Handle Setting 2: 57 (Avg= 47.3)  Left Hand Strength - Lateral Pinch (lbs)  Trial 1: 9  LUE Edema - Circumference (cm)  LUE Edema Present?: No  Wrist Crease: 17.0  Distal Palmar Crease: 18.8  Metacarpals: 18.3  L  Thumb IP: 6.4  L Index PIP: 6.0  L Index DIP: 5.0  L Middle PIP: 6.2  L Middle DIP: 5.1  L Ring PIP: 5.5  L Ring DIP: 4.7  L Little PIP: 5.0  L Little DIP: 4.4  Left 9-Hole Peg Test  Left 9-Hole Peg Test: Functional  Right Hand Strength -  (lbs)  Handle Setting 2: 13 (Avg= 57.3)  Right Hand Strength - Lateral Pinch (lbs)  Trial 1: 0  RUE Edema - Circumference (cm)  RUE Edema Present?: Yes  Wrist Crease: 17.5  Distal Palmar Crease: 20.5  Metacarpals: 19.2  R Thumb IP: 7.1  R Index PIP: 7.1  R Index DIP: 5.6  R Middle PIP: 7.4  R Middle DIP: 5.9  R Ring PIP: 6.7  R Ring DIP: 5.5  R Little PIP: 5.8  R Little DIP: 4.8  Right 9-Hole Peg Test  Right 9-Hole Peg Test: Impaired (Pt demo'd difficulty using thumb during [facility] task)  Fine Motor Skills  Left 9-Hole Peg Test: Functional  Left 9 Hole Peg Test Time (secs): 17 (Avg= 19.48)  Right 9-Hole Peg Test: Impaired (Pt demo'd difficulty using thumb during [facility] task)  Right 9 Hole Peg Test Time (secs): 63 (Avg= 17.86)  Hand Assessment Comment  Hand Assessment Comment: Disabilities of the Arm, Shoulder, adn Hand (DASH): 77.5% level of impairment with ADL tasks    Assessment:    Assessment  Performance deficits / Impairments: Decreased ROM; Decreased strength;Decreased fine motor control  Treatment Diagnosis: Status post carpal tunnel release; CMC arthritis  Prognosis: Good  Decision Making: Medium Complexity  REQUIRES OT FOLLOW-UP: Yes  Activity Tolerance  Activity Tolerance: Patient tolerated evaluation without incident       Plan:    Plan  Plan Weeks: 4  Current Treatment Recommendations: Strengthening, ROM, Self-Care / ADL, Safety education & training, Patient/Caregiver education & training    Goals:  Short Term Goals  Time Frame for Short term goals: 2 weeks  Short Term Goal 1: Provide patient education of techniques to reduce edema in R hand  Short Term Goal 2: Provide education for a/e to ease ADL tasks  Long Term Goals  Time Frame for Long term goals : 4 weeks  Long Term Goal 1: Pt will decrease edema in R hand with PIP and DIP joints within 0.3cm of L hand and MCPs/distal palmar crease/wrist crease within 0.5-1.0 cm of L hand  Long Term Goal 2: Pt will improve ROM of RUE to > 65° wrist flexion, > 65 wrist extension, > 35° UD, thumb palmar abduction > 40°, thumb radial abduction > 55°, and be able to make a functional fist for improved ability to complete self care tasks  Long Term Goal 3: Pt will improve FMC of RUE with score on 9HPT < 30 seconds for improved ability to complete ADL tasks  Long Term Goal 4: Pt will improve score on DASH to < 30% level of impariment to indicate overall improvement in functional use of RUE with self care tasks    Therapy Time:   Individual Concurrent Group Co-treatment   Time In 1008         Time Out 1050         Minutes 42         Timed Code Treatment Minutes: [address], OTD, OTR/L

## 2022-09-02 NOTE — PLAN OF CARE
Occupational Therapy    [x] [place]  Phone: [phone]  Fax: [phone]      [] [place]  Phone: [phone]  Fax: [phone]       To: Antonella Craft MD     Patient: Fidelia Sandy  : 1965  MRN: [id number]  Evaluation Date: 2022      Diagnosis Information:  Diagnosis: Status post carpal tunnel release; CMC arthritis         Occupational Therapy Certification/Re-Certification Form  Dear Dr. Alina Toscano,  The following patient has been evaluated for occupational therapy services and for therapy to continue, insurance requires physician review of the treatment plan. Please review the attached evaluation and/or summary of the patient's plan of care, and verify that you agree therapy should continue by signing the attached document and sending it back to our office.     Plan of Care/Treatment to date: 22-22 (late entry)  [x] Therapeutic Exercise   [] Modalities:  [x] Therapeutic Activity    [x] Ultrasound  [] Electrical Stimulation   [x] Activities of Daily Living    [x] Paraffin   [x] Kinesiotaping  [] Neuromuscular Re-education   [] Iontophoresis [x] Coldpack/hotpack   [x] Instruction in HEP     [] Orthotics/splint []   [x] Manual Therapy       [] Aquatic Therapy            Frequency/Duration:  # Days per week: [] 1 day # Weeks: [] 1 week [] 5 weeks      [] 2 days   [] 2 weeks [] 6 weeks     [x] 3 days   [] 3 weeks [] 7 weeks     [] 4 days   [x] 4 weeks [] 8 weeks  Goals:  Short Term Goals  Time Frame for Short term goals: 2 weeks  Short Term Goal 1: Provide patient education of techniques to reduce edema in R hand  Short Term Goal 2: Provide education for a/e to ease ADL tasks  Long Term Goals  Time Frame for Long term goals : 4 weeks  Long Term Goal 1: Pt will decrease edema in R hand with PIP and DIP joints within 0.3cm of L hand and MCPs/distal palmar crease/wrist crease within 0.5-1.0 cm of L hand  Long Term Goal 2: Pt will improve ROM of RUE to > 65° wrist flexion, > 65 wrist

## 2022-09-13 ENCOUNTER — HOSPITAL ENCOUNTER (OUTPATIENT)
Dept: OCCUPATIONAL THERAPY | Age: 57
Setting detail: THERAPIES SERIES
Discharge: HOME | End: 2022-09-13
Payer: COMMERCIAL

## 2022-09-13 PROCEDURE — 97140 MANUAL THERAPY 1/> REGIONS: CPT

## 2022-09-13 PROCEDURE — 97530 THERAPEUTIC ACTIVITIES: CPT

## 2022-09-13 NOTE — PROGRESS NOTES
[facility]    [x] [place]  Phone: [phone]  Fax: [phone]      [] [place]  Phone: [phone]  Fax: [phone]    Occupational Therapy Daily Treatment Note  Date:  2022    Patient Name:  Joo Ugarte    :  1965  MRN: [id number]  Restrictions/Precautions:      Medical/Treatment Diagnosis Information:   Diagnosis: Status post carpal tunnel release; CMC arthritis      Insurance/Certification information:   Physician Information: Etta Rodarte MD  Plan of care signed (Y/N):  n    Visit# / total visits:      Pain level: 8/10 at rest and with movement     Progress Note: []  Yes  [x]  No  Next due by: Visit #10      Date of evaluation/re-evaluation: 22-22    Time In: 4013  Time Out: 1137    Subjective:     Pt is a 63 yo female status post right Taylor's arthroplasty, carpal tunnel and ulnar nerve decompression with anterior subcutaneous transposition on 22. Patient reports increased pain this date; unsure of cause. Objective/Assessment:   R hand/digits warm to touch with edema present; manual lymph drainage RUE from digits/thumb>wrist>forearm>elbow>upper arm/shoulder >chest; educated patient on technique and to complete at home for HEP with understanding. Instructed patient to svetlana isotoner glove throughout the day to further decrease edema. Educated on use of button hook for ease in buttoning with patient verbalizing understanding. Also educated on elastic shoelaces for ease in donning/doffing shoes with patient verbalizing understanding. Also educated on rocker knife for ease in cutting food.     Exercises:   Exercise/Equipment Resistance/Repetitions Other comments   Tendon Glides 10x Issued HEP   Thumb Opposition 10x Issued HEP                                                                Therapeutic Exercise  [] Provided verbal/tactile cueing for activities related to strengthening, flexibility, endurance, ROM. (00560)  Neuro  Re-Ed  [] Provided verbal/tactile cueing for activities related to improving balance, coordination, kinesthetic sense, posture, motor skill, proprioception. (24033)     Therapeutic Activities/ADL:   [x] Provided use of dynamic activities to improve functional performance (39678)  [] Provided self-care/home management training for activities of daily living and compensatory training (13957)     Manual Treatments:   [x] Provided manual therapy to mobilize soft tissue/joints for the purpose of modulating pain, promoting relaxation, increasing ROM, reducing/eliminating soft tissue swelling/inflammation/restriction, improving soft tissue extensibility. (82834)     Orthotic Management:   [] Provided assessment and fitting orthotic device for improved functional performance.  (67833)    Service Based Modalities:      Timed Code Treatment Minutes:   30 there act  10 manual    Total Treatment Minutes:   40    Treatment/Activity Tolerance:  [x] Patient tolerated treatment well [] Patient limited by fatique  [] Patient limited by pain  [] Patient limited by other medical complications  [] Other:     Prognosis: [x] Good [] Fair  [] Poor    Patient Requires Follow-up: [x] Yes  [] No      Goals:  Short Term Goals  Time Frame for Short term goals: 2 weeks  Short Term Goal 1: Provide patient education of techniques to reduce edema in R hand  Short Term Goal 2: Provide education for a/e to ease ADL tasks  Long Term Goals  Time Frame for Long term goals : 4 weeks  Long Term Goal 1: Pt will decrease edema in R hand with PIP and DIP joints within 0.3cm of L hand and MCPs/distal palmar crease/wrist crease within 0.5-1.0 cm of L hand  Long Term Goal 2: Pt will improve ROM of RUE to > 65° wrist flexion, > 65 wrist extension, > 35° UD, thumb palmar abduction > 40°, thumb radial abduction > 55°, and be able to make a functional fist for improved ability to complete self care tasks  Long Term Goal 3: Pt will improve [facility]

## 2022-09-14 ENCOUNTER — OFFICE VISIT (OUTPATIENT)
Dept: FAMILY MEDICINE CLINIC | Age: 57
End: 2022-09-14
Payer: COMMERCIAL

## 2022-09-14 VITALS
SYSTOLIC BLOOD PRESSURE: 128 MMHG | HEIGHT: 61 IN | HEART RATE: 80 BPM | DIASTOLIC BLOOD PRESSURE: 70 MMHG | WEIGHT: 150 LBS | BODY MASS INDEX: 28.32 KG/M2

## 2022-09-14 DIAGNOSIS — M77.42 METATARSALGIA OF LEFT FOOT: ICD-10-CM

## 2022-09-14 DIAGNOSIS — M79.672 FOOT PAIN, LEFT: Primary | ICD-10-CM

## 2022-09-14 PROCEDURE — 99213 OFFICE O/P EST LOW 20 MIN: CPT | Performed by: FAMILY MEDICINE

## 2022-09-14 ASSESSMENT — ENCOUNTER SYMPTOMS
ALLERGIC/IMMUNOLOGIC NEGATIVE: 1
RESPIRATORY NEGATIVE: 1
GASTROINTESTINAL NEGATIVE: 1
EYES NEGATIVE: 1

## 2022-09-14 NOTE — PROGRESS NOTES
Subjective:      Patient ID: Luis Fernando Cary is a 62 y.o. female. HPI  acute visit for left foot pain in the last month. Pain and perceived swelling reported. Using tylenol and biofreeze. No recalled injury or initiating event. She feels she has had some issues in the past with the lower leg and foot. Currently not as active following surgeries on the right arm. Ulnar nerve release, taylor's arthroplasty of the right thumb, and ctr. Having ongoing pains in the fingers and hand. Stiffness and pain. Still not back to work. Considering disability. 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 Tobacco abuse      Past Surgical History:   Procedure Laterality Date    ARTHROPLASTY Right 7/13/2022    Right Taylor's ARTHROPLASTY -123 performed by Cintia Anderson MD at [place] [address] Right 7/13/2022    Right Carpal & Cubital Tunnel Releases with ulnar nerve transposition  performed by Cintia Anderson MD at [address] [place] [address]  09/11/2002    With laser cone biopsy of the cervix. TUBAL LIGATION       Current Outpatient Medications   Medication Sig Dispense Refill    ALPRAZolam (XANAX) 1 MG tablet take 1 tablet by mouth twice a day 60 tablet 0    HYDROcodone-acetaminophen (NORCO) 5-325 MG per tablet Take 1 tablet by mouth 2 times daily as needed for Pain for up to 30 days. 60 tablet 0    triamcinolone (KENALOG) 0.1 % cream Apply topically 2 times daily.  80 g 2    naloxone 4 MG/0.1ML LIQD nasal spray 1 spray by Nasal route as needed for Opioid Reversal 1 each 5    simvastatin (ZOCOR) 20 MG tablet Take 1 tablet by mouth nightly 90 tablet 1    meloxicam (MOBIC) 15 MG tablet Take 1 tablet by mouth daily 90 tablet 1    traZODone (DESYREL) 50 MG tablet Take 1 tablet by mouth nightly 30 tablet 11    aspirin 81 MG tablet Take 81 mg by mouth daily      midodrine (PROAMATINE) 5 MG tablet Take 1 tablet by mouth 3 times daily (Patient not taking: Reported on 9/14/2022) 90 tablet 3     No current facility-administered medications for this visit. Review of Systems   Constitutional: Negative. HENT: Negative. Eyes: Negative. Respiratory: Negative. Cardiovascular:  Negative for chest pain and palpitations. Gastrointestinal: Negative. Endocrine: Negative. Genitourinary: Negative. Musculoskeletal:  Positive for arthralgias and joint swelling (right hand, left foot). Skin:  Positive for rash (chronic, posterior scalp). Allergic/Immunologic: Negative. Neurological:  Positive for numbness. Negative for dizziness, weakness and light-headedness. Hematological: Negative. Psychiatric/Behavioral: Negative. Objective:   Physical Exam  Constitutional:       General: She is not in acute distress. Appearance: Normal appearance. She is not toxic-appearing. HENT:      Head: Normocephalic and atraumatic. Nose: Nose normal.   Cardiovascular:      Rate and Rhythm: Normal rate. Pulses: Normal pulses. Pulmonary:      Effort: Pulmonary effort is normal. No respiratory distress. Abdominal:      General: There is no distension. Tenderness: There is no abdominal tenderness. Musculoskeletal:      Right hand: Swelling, tenderness and bony tenderness present. Decreased range of motion. Decreased strength. Left foot: Bony tenderness (mtp areas) present. No deformity, bunion or foot drop. Neurological:      Mental Status: She is alert. /70 (Site: Left Upper Arm, Position: Sitting, Cuff Size: Large Adult)   Pulse 80   Ht 5' 1\" (1.549 m)   Wt 150 lb (68 kg)   LMP 08/01/2017 (Within Months)   BMI 28.34 kg/m²     Assessment:      Encounter Diagnoses   Name Primary?     Foot pain, left Yes Metatarsalgia of left foot            Plan:      Cont. Mobic daily  Rec. Foot insoles, off loading the mtp area. Consider podiatry follow up if persistent. Rue issues with pain. S/p multiple surgeries : CTR, cubital tunnel release, burtons arthoplasty. Hand still swollen around the 3rd mtp. Consider rheumatoid factor check with next labs. Consider complex regional pain syndrome/RSD.             Naty Caputo MD

## 2022-09-16 ENCOUNTER — HOSPITAL ENCOUNTER (OUTPATIENT)
Dept: OCCUPATIONAL THERAPY | Age: 57
Setting detail: THERAPIES SERIES
Discharge: HOME | End: 2022-09-16
Payer: COMMERCIAL

## 2022-09-16 PROCEDURE — 97140 MANUAL THERAPY 1/> REGIONS: CPT

## 2022-09-16 PROCEDURE — 97530 THERAPEUTIC ACTIVITIES: CPT

## 2022-09-16 PROCEDURE — 97035 APP MDLTY 1+ULTRASOUND EA 15: CPT

## 2022-09-16 NOTE — FLOWSHEET NOTE
[facility]    [x] [place]  Phone: [phone]  Fax: [phone]      [] [place]  Phone: [phone]  Fax: [phone]    Occupational Therapy Daily Treatment Note  Date:  2022    Patient Name:  Geronimo Lopez    :  1965  MRN: [id number]  Restrictions/Precautions:      Medical/Treatment Diagnosis Information:   Diagnosis: Status post carpal tunnel release; CMC arthritis      Insurance/Certification information:   Physician Information: Velasquez Roper MD  Plan of care signed (Y/N):  n    Visit# / total visits:      Pain level: 4/10 at rest and with movement \"comes and goes\" (hurts by my knuckles and through the back of my hand)    Progress Note: []  Yes  [x]  No  Next due by: Visit #10      Date of evaluation/re-evaluation: 22-22    Time In: [address]  Time Out: 150    Subjective:     Pt is a 63 yo female status post right Taylor's arthroplasty, carpal tunnel and ulnar nerve decompression with anterior subcutaneous transposition on 22. Patient reports that she was able to cut up food the other day with increased pain, but she pushed through it. Pt also reports that she ordered the rocker knife, but has not yet received it.      Objective/Assessment:   1:1 there act provided for improved ROM, strength, and functional use of R hand, see log below for details  Manual therapy applied for decreased edema and scar tissue management  U/S applied to R CT area and Taylor's arthroplasty 3.0mhz 0.8cm2 8 min    Measurements taken this date:  R Wrist Flexion 0-80: 55° (39° at eval)  R Wrist Extension 0-70: 55° (45°  R Wrist Ulnar Deviation 0-45: 30° (20°  R Thumb Radial ADduction/ABduction 0-55: 50° (45° at eval)  R Thumb Palmar ADduction/ABduction 0-45: 30° (30° at eval)  Pt almost able to make a full fist (3rd digit is < 0.5 cm from palm of hand)      Exercises:   Exercise/Equipment Resistance/Repetitions Other comments   Tendon Glides 10x Thumb Opposition 8x Opposition to tip of each digit. Pt unable to touch D4 and D5. Pt reports increased pain in thumb with opposition. Prayer stretch Hold 10s    x5 Issued for HEP   Wrist flexion stretch Hold 10s    x5 Issued for HEP   Theraputty Yellow Squeeze, pull, pinch -Issued for HEP   Hand Exerciser 1 red 3 yellow        15x2    Flexbar Red      4x Attempted- pt able to complete 4x in \"u\" motion, but reported too much pain in R thumb to continue                                       Therapeutic Exercise  [] Provided verbal/tactile cueing for activities related to strengthening, flexibility, endurance, ROM. (04870)  Neuro  Re-Ed  [] Provided verbal/tactile cueing for activities related to improving balance, coordination, kinesthetic sense, posture, motor skill, proprioception. (65001)     Therapeutic Activities/ADL:   [x] Provided use of dynamic activities to improve functional performance (44724)  [] Provided self-care/home management training for activities of daily living and compensatory training (76859)     Manual Treatments:   [x] Provided manual therapy to mobilize soft tissue/joints for the purpose of modulating pain, promoting relaxation, increasing ROM, reducing/eliminating soft tissue swelling/inflammation/restriction, improving soft tissue extensibility. (91137)     Orthotic Management:   [] Provided assessment and fitting orthotic device for improved functional performance.  (63140)    Service Based Modalities:      Timed Code Treatment Minutes:   8 u/s  10 manual 27 there act    Total Treatment Minutes:   45    Treatment/Activity Tolerance:  [x] Patient tolerated treatment well [] Patient limited by fatique  [] Patient limited by pain  [] Patient limited by other medical complications  [] Other:     Prognosis: [x] Good [] Fair  [] Poor    Patient Requires Follow-up: [x] Yes  [] No      Goals:  Short Term Goals  Time Frame for Short term goals: 2 weeks  Short Term Goal 1: Provide patient

## 2022-09-19 ENCOUNTER — PATIENT MESSAGE (OUTPATIENT)
Dept: FAMILY MEDICINE CLINIC | Age: 57
End: 2022-09-19

## 2022-09-19 ENCOUNTER — HOSPITAL ENCOUNTER (OUTPATIENT)
Dept: OCCUPATIONAL THERAPY | Age: 57
Setting detail: THERAPIES SERIES
Discharge: HOME | End: 2022-09-19
Payer: COMMERCIAL

## 2022-09-19 DIAGNOSIS — M25.50 ARTHRALGIA, UNSPECIFIED JOINT: Primary | ICD-10-CM

## 2022-09-19 DIAGNOSIS — G89.29 CHRONIC BILATERAL LOW BACK PAIN WITHOUT SCIATICA: ICD-10-CM

## 2022-09-19 DIAGNOSIS — M54.50 CHRONIC BILATERAL LOW BACK PAIN WITHOUT SCIATICA: ICD-10-CM

## 2022-09-19 PROCEDURE — 97140 MANUAL THERAPY 1/> REGIONS: CPT | Performed by: OCCUPATIONAL THERAPIST

## 2022-09-19 PROCEDURE — 97110 THERAPEUTIC EXERCISES: CPT | Performed by: OCCUPATIONAL THERAPIST

## 2022-09-19 RX ORDER — HYDROCODONE BITARTRATE AND ACETAMINOPHEN 5; 325 MG/1; MG/1
1 TABLET ORAL 2 TIMES DAILY PRN
Qty: 60 TABLET | Refills: 0 | Status: SHIPPED | OUTPATIENT
Start: 2022-09-19 | End: 2022-10-21 | Stop reason: SDUPTHER

## 2022-09-19 NOTE — TELEPHONE ENCOUNTER
From: Aleksandr Mendoza  To: Dr. Matt Wilkes: 9/19/2022 9:52 AM EDT  Subject: Refill    Refill on my norco not on my chart

## 2022-09-19 NOTE — FLOWSHEET NOTE
[facility]    [x] [place]  Phone: [phone]  Fax: [phone]      [] [place]  Phone: [phone]  Fax: [phone]    Occupational Therapy Daily Treatment Note  Date:  2022    Patient Name:  Fabien Parker    :  1965  MRN: [id number]  Restrictions/Precautions:      Medical/Treatment Diagnosis Information:   Diagnosis: Status post carpal tunnel release; CMC arthritis      Insurance/Certification information:   Physician Information: Billy Perez MD  Plan of care signed (Y/N):  y    Visit# / total visits:  3/12    Pain level: 4/10 at rest and with movement \"comes and goes\" (hurts by my knuckles and through the back of my hand); \"increased pain today\"    Progress Note: []  Yes  [x]  No  Next due by: Visit #10      Date of evaluation/re-evaluation: 22-22    Time In: 80  Time Out:1150    Subjective:     Pt is a 63 yo female status post right Taylor's arthroplasty, carpal tunnel and ulnar nerve decompression with anterior subcutaneous transposition on 22. Objective/Assessment:   1:1 there act provided for improved ROM, strength, and functional use of R hand, see log below for details    U/S applied to R CT area and Taylor's arthroplasty 3.0mhz 0.8cm2 8 min  Myofascial release and cupping applied for scar tissue management, manual lymph drainage applied for decreased edema, also issued tubigrip for arm to be worn with isotoner glove. Exercises:   Exercise/Equipment Resistance/Repetitions Other comments   Tendon Glides 10x Patient to stretch hand into extension with each repetition    Thumb Opposition 8x Opposition to tip of each digit.     Prayer stretch Hold 10s    x5 Issued for HEP   Wrist flexion stretch Hold 10s    x5 Issued for HEP   Theraputty Yellow Squeeze, pull, pinch -Issued for HEP   Hand Exerciser 1 red 3 yellow        20x2    Flexbar Red      10x  \"u\"  \"n\"   Wrist maze  4x    Wrist roller  1# 3x                              Therapeutic Exercise  [] Provided verbal/tactile cueing for activities related to strengthening, flexibility, endurance, ROM. (63655)  Neuro  Re-Ed  [] Provided verbal/tactile cueing for activities related to improving balance, coordination, kinesthetic sense, posture, motor skill, proprioception. (00188)     Therapeutic Activities/ADL:   [x] Provided use of dynamic activities to improve functional performance (53304)  [] Provided self-care/home management training for activities of daily living and compensatory training (10333)     Manual Treatments:   [x] Provided manual therapy to mobilize soft tissue/joints for the purpose of modulating pain, promoting relaxation, increasing ROM, reducing/eliminating soft tissue swelling/inflammation/restriction, improving soft tissue extensibility. (79565)     Orthotic Management:   [] Provided assessment and fitting orthotic device for improved functional performance.  (81164)    Service Based Modalities:      Timed Code Treatment Minutes:   8 u/s  15 manual 27 there act    Total Treatment Minutes:   50    Treatment/Activity Tolerance:  [x] Patient tolerated treatment well [] Patient limited by fatique  [] Patient limited by pain  [] Patient limited by other medical complications  [] Other:     Prognosis: [x] Good [] Fair  [] Poor    Patient Requires Follow-up: [x] Yes  [] No      Goals:  Short Term Goals  Time Frame for Short term goals: 2 weeks  Short Term Goal 1: Provide patient education of techniques to reduce edema in R hand  Short Term Goal 2: Provide education for a/e to ease ADL tasks  Long Term Goals  Time Frame for Long term goals : 4 weeks  Long Term Goal 1: Pt will decrease edema in R hand with PIP and DIP joints within 0.3cm of L hand and MCPs/distal palmar crease/wrist crease within 0.5-1.0 cm of L hand  Long Term Goal 2: Pt will improve ROM of RUE to > 65° wrist flexion, > 65 wrist extension, > 35° UD, thumb palmar abduction > 40°, thumb radial abduction > 55°, and be able to make a functional fist for improved ability to complete self care tasks  Long Term Goal 3: Pt will improve FMC of RUE with score on 9HPT < 30 seconds for improved ability to complete ADL tasks  Long Term Goal 4: Pt will improve score on DASH to < 30% level of impariment to indicate overall improvement in functional use of RUE with self care tasks    Plan:   [x] Continue per plan of care [] Alter current plan (see comments)  [] Plan of care initiated [] Hold pending MD visit [] Discharge    Plan for Next Session:      Electronically signed by:  Maribel Alexander  OT/L

## 2022-09-21 ENCOUNTER — HOSPITAL ENCOUNTER (OUTPATIENT)
Dept: LAB | Age: 57
Discharge: HOME | End: 2022-09-21
Payer: COMMERCIAL

## 2022-09-21 ENCOUNTER — HOSPITAL ENCOUNTER (OUTPATIENT)
Dept: OCCUPATIONAL THERAPY | Age: 57
Setting detail: THERAPIES SERIES
Discharge: HOME | End: 2022-09-21
Payer: COMMERCIAL

## 2022-09-21 DIAGNOSIS — M25.50 ARTHRALGIA, UNSPECIFIED JOINT: ICD-10-CM

## 2022-09-21 LAB — RHEUMATOID FACTOR: <10 IU/ML

## 2022-09-21 PROCEDURE — 97140 MANUAL THERAPY 1/> REGIONS: CPT | Performed by: OCCUPATIONAL THERAPIST

## 2022-09-21 PROCEDURE — 36415 COLL VENOUS BLD VENIPUNCTURE: CPT

## 2022-09-21 PROCEDURE — 97110 THERAPEUTIC EXERCISES: CPT | Performed by: OCCUPATIONAL THERAPIST

## 2022-09-21 PROCEDURE — 86431 RHEUMATOID FACTOR QUANT: CPT

## 2022-09-21 PROCEDURE — 97035 APP MDLTY 1+ULTRASOUND EA 15: CPT | Performed by: OCCUPATIONAL THERAPIST

## 2022-09-21 NOTE — FLOWSHEET NOTE
[facility]    [x] [place]  Phone: [phone]  Fax: [phone]      [] [place]  Phone: [phone]  Fax: [phone]    Occupational Therapy Daily Treatment Note  Date:  2022    Patient Name:  Mere Pop    :  1965  MRN: [id number]  Restrictions/Precautions:      Medical/Treatment Diagnosis Information:   Diagnosis: Status post carpal tunnel release; CMC arthritis   Insurance/Certification information:   Physician Information: Sara Taylor MD  Plan of care signed (Y/N):  y    Visit# / total visits:      Pain level: 2/10 at rest and with movement \"comes and goes\" (hurts by my knuckles and through the back of my hand)  Progress Note: []  Yes  [x]  No  Next due by: Visit #10      Date of evaluation/re-evaluation: 22-22    Time In: 56   Time Out: 1125    Subjective:     Pt is a 61 yo female status post right Taylor's arthroplasty, carpal tunnel and ulnar nerve decompression with anterior subcutaneous transposition on 22. Patient reports compliance with glove, tubigrip, and HEP    Objective/Assessment:   MH R hand/wrist 5min  1:1 there act provided for improved ROM, strength, and functional use of R hand, see log below for details  U/S applied to R CT area and Taylor's arthroplasty 3.0mhz 0.8cm2 8 min  Myofascial release and cupping applied for scar tissue management, manual lymph drainage applied for decreased edema. Compression applied to RUE and hand with coban and short stretch wraps for edema control. Exercises:   Exercise/Equipment Resistance/Repetitions Other comments   Tendon Glides 10x Patient to stretch hand into extension with each repetition    Thumb Opposition 8x Opposition to tip of each digit.     Prayer stretch Hold 10s    x5 Issued for HEP   Wrist flexion stretch Hold 10s    x5 Issued for HEP   Theraputty Yellow Squeeze, pull, pinch -Issued for HEP   Hand Exerciser 1 red 3 yellow        20x2 Flexbar Red      10x  \"u\"  \"n\"   Wrist maze  4x    Wrist roller  1#                       3x    Resist pin Yellow                10x Rodriguez and lateral pinch                       Therapeutic Exercise  [] Provided verbal/tactile cueing for activities related to strengthening, flexibility, endurance, ROM. (23128)  Neuro  Re-Ed  [] Provided verbal/tactile cueing for activities related to improving balance, coordination, kinesthetic sense, posture, motor skill, proprioception. (92828)     Therapeutic Activities/ADL:   [x] Provided use of dynamic activities to improve functional performance (92759)  [] Provided self-care/home management training for activities of daily living and compensatory training (77022)     Manual Treatments:   [x] Provided manual therapy to mobilize soft tissue/joints for the purpose of modulating pain, promoting relaxation, increasing ROM, reducing/eliminating soft tissue swelling/inflammation/restriction, improving soft tissue extensibility. (48150)     Orthotic Management:   [] Provided assessment and fitting orthotic device for improved functional performance.  (06830)    Service Based Modalities:   5m    Timed Code Treatment Minutes:   8 u/s  15 manual 27 there act    Total Treatment Minutes:   55    Treatment/Activity Tolerance:  [x] Patient tolerated treatment well [] Patient limited by fatique  [] Patient limited by pain  [] Patient limited by other medical complications  [] Other:     Prognosis: [x] Good [] Fair  [] Poor    Patient Requires Follow-up: [x] Yes  [] No      Goals:  Short Term Goals  Time Frame for Short term goals: 2 weeks  Short Term Goal 1: Provide patient education of techniques to reduce edema in R hand  Short Term Goal 2: Provide education for a/e to ease ADL tasks  Long Term Goals  Time Frame for Long term goals : 4 weeks  Long Term Goal 1: Pt will decrease edema in R hand with PIP and DIP joints within 0.3cm of L hand and MCPs/distal palmar crease/wrist crease within 0.5-1.0 cm of L hand  Long Term Goal 2: Pt will improve ROM of RUE to > 65° wrist flexion, > 65 wrist extension, > 35° UD, thumb palmar abduction > 40°, thumb radial abduction > 55°, and be able to make a functional fist for improved ability to complete self care tasks  Long Term Goal 3: Pt will improve FMC of RUE with score on 9HPT < 30 seconds for improved ability to complete ADL tasks  Long Term Goal 4: Pt will improve score on DASH to < 30% level of impariment to indicate overall improvement in functional use of RUE with self care tasks    Plan:   [x] Continue per plan of care [] Alter current plan (see comments)  [] Plan of care initiated [] Hold pending MD visit [] Discharge    Plan for Next Session:      Electronically signed by:  Tejal Arnold OT/LOVE

## 2022-09-25 DIAGNOSIS — F41.9 ANXIETY: ICD-10-CM

## 2022-09-26 RX ORDER — ALPRAZOLAM 1 MG/1
TABLET ORAL
Qty: 60 TABLET | Refills: 0 | Status: SHIPPED | OUTPATIENT
Start: 2022-09-26 | End: 2022-10-27 | Stop reason: SDUPTHER

## 2022-09-26 RX ORDER — TRIAMCINOLONE ACETONIDE 1 MG/G
CREAM TOPICAL
Qty: 80 G | Refills: 2 | Status: SHIPPED | OUTPATIENT
Start: 2022-09-26

## 2022-10-18 RX ORDER — TRAZODONE HYDROCHLORIDE 50 MG/1
50 TABLET ORAL NIGHTLY
Qty: 30 TABLET | Refills: 11 | Status: SHIPPED | OUTPATIENT
Start: 2022-10-18

## 2022-10-20 ENCOUNTER — PATIENT MESSAGE (OUTPATIENT)
Dept: FAMILY MEDICINE CLINIC | Age: 57
End: 2022-10-20

## 2022-10-20 DIAGNOSIS — M54.50 CHRONIC BILATERAL LOW BACK PAIN WITHOUT SCIATICA: ICD-10-CM

## 2022-10-20 DIAGNOSIS — G89.29 CHRONIC BILATERAL LOW BACK PAIN WITHOUT SCIATICA: ICD-10-CM

## 2022-10-21 RX ORDER — HYDROCODONE BITARTRATE AND ACETAMINOPHEN 5; 325 MG/1; MG/1
1 TABLET ORAL 2 TIMES DAILY PRN
Qty: 60 TABLET | Refills: 0 | Status: SHIPPED | OUTPATIENT
Start: 2022-10-21 | End: 2022-11-20

## 2022-10-26 ENCOUNTER — PATIENT MESSAGE (OUTPATIENT)
Dept: FAMILY MEDICINE CLINIC | Age: 57
End: 2022-10-26

## 2022-10-27 DIAGNOSIS — G89.29 CHRONIC BILATERAL LOW BACK PAIN WITHOUT SCIATICA: ICD-10-CM

## 2022-10-27 DIAGNOSIS — F41.9 ANXIETY: ICD-10-CM

## 2022-10-27 DIAGNOSIS — M54.50 CHRONIC BILATERAL LOW BACK PAIN WITHOUT SCIATICA: ICD-10-CM

## 2022-10-27 RX ORDER — ALPRAZOLAM 1 MG/1
TABLET ORAL
Qty: 60 TABLET | Refills: 0 | Status: SHIPPED | OUTPATIENT
Start: 2022-10-27 | End: 2022-11-28 | Stop reason: SDUPTHER

## 2022-10-27 NOTE — TELEPHONE ENCOUNTER
From: Shima Horn  To: Dr. Kala Hatchet: 10/26/2022 4:56 PM EDT  Subject: Refill    Need my xanix filled please

## 2022-10-27 NOTE — PROGRESS NOTES
Yossi Aldana called requesting a refill of the below medication which has been pended for you:   [place] from [place], [place], and [place] reviewed. Alprazolam 1 mg last filled 9/29/22 #60/30 days     is out of the office    Requested Prescriptions     Pending Prescriptions Disp Refills    ALPRAZolam (XANAX) 1 MG tablet 60 tablet 0     Sig: take 1 tablet by mouth twice a day       Last Appointment Date: 9/14/22  Next Appointment Date: Visit date not found    Allergies   Allergen Reactions    Naproxen      Nausea/shaky feeling    Requip [Ropinirole] Other (See Comments)     Hypotension and syncope.

## 2022-10-29 ENCOUNTER — HOSPITAL ENCOUNTER (EMERGENCY)
Age: 57
Discharge: HOME | End: 2022-10-29
Attending: EMERGENCY MEDICINE
Payer: COMMERCIAL

## 2022-10-29 ENCOUNTER — APPOINTMENT (OUTPATIENT)
Dept: CT IMAGING | Age: 57
End: 2022-10-29
Payer: COMMERCIAL

## 2022-10-29 VITALS
TEMPERATURE: 96.8 F | BODY MASS INDEX: 29.27 KG/M2 | HEIGHT: 61 IN | DIASTOLIC BLOOD PRESSURE: 56 MMHG | SYSTOLIC BLOOD PRESSURE: 105 MMHG | WEIGHT: 155 LBS | OXYGEN SATURATION: 98 % | HEART RATE: 72 BPM | RESPIRATION RATE: 16 BRPM

## 2022-10-29 DIAGNOSIS — R10.13 ABDOMINAL PAIN, EPIGASTRIC: Primary | ICD-10-CM

## 2022-10-29 LAB
ABSOLUTE EOS #: 0.26 K/UL (ref 0–0.44)
ABSOLUTE IMMATURE GRANULOCYTE: <0.03 K/UL (ref 0–0.3)
ABSOLUTE LYMPH #: 2.39 K/UL (ref 1.1–3.7)
ABSOLUTE MONO #: 0.66 K/UL (ref 0.1–1.2)
ALBUMIN SERPL-MCNC: 3.9 G/DL (ref 3.5–5.2)
ALBUMIN/GLOBULIN RATIO: 1.5 (ref 1–2.5)
ALP BLD-CCNC: 73 U/L (ref 35–104)
ALT SERPL-CCNC: 24 U/L (ref 5–33)
ANION GAP SERPL CALCULATED.3IONS-SCNC: 12 MMOL/L (ref 9–17)
AST SERPL-CCNC: 19 U/L
BACTERIA: NORMAL
BASOPHILS # BLD: 0 % (ref 0–2)
BASOPHILS ABSOLUTE: 0.03 K/UL (ref 0–0.2)
BILIRUB SERPL-MCNC: 0.3 MG/DL (ref 0.3–1.2)
BILIRUBIN URINE: NEGATIVE
BUN BLDV-MCNC: 14 MG/DL (ref 6–20)
BUN/CREAT BLD: 22 (ref 9–20)
CALCIUM SERPL-MCNC: 8.9 MG/DL (ref 8.6–10.4)
CHLORIDE BLD-SCNC: 98 MMOL/L (ref 98–107)
CO2: 21 MMOL/L (ref 20–31)
CREAT SERPL-MCNC: 0.64 MG/DL (ref 0.5–0.9)
EKG ATRIAL RATE: 55 BPM
EKG P AXIS: 68 DEGREES
EKG P-R INTERVAL: 166 MS
EKG Q-T INTERVAL: 432 MS
EKG QRS DURATION: 86 MS
EKG QTC CALCULATION (BAZETT): 413 MS
EKG R AXIS: 58 DEGREES
EKG T AXIS: 24 DEGREES
EKG VENTRICULAR RATE: 55 BPM
EOSINOPHILS RELATIVE PERCENT: 4 % (ref 1–4)
EPITHELIAL CELLS UA: NORMAL /HPF (ref 0–5)
ETHANOL: 204 MG/DL
GFR SERPL CREATININE-BSD FRML MDRD: >60 ML/MIN/1.73M2
GLUCOSE BLD-MCNC: 94 MG/DL (ref 70–99)
GLUCOSE URINE: NEGATIVE
HCT VFR BLD CALC: 36.5 % (ref 36.3–47.1)
HEMOGLOBIN: 12.6 G/DL (ref 11.9–15.1)
IMMATURE GRANULOCYTES: 0 %
KETONES, URINE: NEGATIVE
LEUKOCYTE ESTERASE, URINE: NEGATIVE
LIPASE: 27 U/L (ref 13–60)
LYMPHOCYTES # BLD: 34 % (ref 24–43)
MCH RBC QN AUTO: 32.3 PG (ref 25.2–33.5)
MCHC RBC AUTO-ENTMCNC: 34.5 G/DL (ref 25.2–33.5)
MCV RBC AUTO: 93.6 FL (ref 82.6–102.9)
MONOCYTES # BLD: 9 % (ref 3–12)
NITRITE, URINE: NEGATIVE
NRBC AUTOMATED: 0 PER 100 WBC
PDW BLD-RTO: 12.3 % (ref 11.8–14.4)
PH UA: 5.5 (ref 5–6)
PLATELET # BLD: 279 K/UL (ref 138–453)
PMV BLD AUTO: 8.6 FL (ref 8.1–13.5)
POTASSIUM SERPL-SCNC: 3.8 MMOL/L (ref 3.7–5.3)
PROTEIN UA: NEGATIVE
RBC # BLD: 3.9 M/UL (ref 3.95–5.11)
RBC UA: NORMAL /HPF (ref 0–4)
SEG NEUTROPHILS: 53 % (ref 36–65)
SEGMENTED NEUTROPHILS ABSOLUTE COUNT: 3.74 K/UL (ref 1.5–8.1)
SODIUM BLD-SCNC: 131 MMOL/L (ref 135–144)
SPECIFIC GRAVITY UA: 1 (ref 1.01–1.02)
TOTAL PROTEIN: 6.5 G/DL (ref 6.4–8.3)
TROPONIN, HIGH SENSITIVITY: <6 NG/L (ref 0–14)
URINE HGB: NEGATIVE
UROBILINOGEN, URINE: NORMAL
WBC # BLD: 7.1 K/UL (ref 3.5–11.3)
WBC UA: NORMAL /HPF (ref 0–4)

## 2022-10-29 PROCEDURE — 80053 COMPREHEN METABOLIC PANEL: CPT

## 2022-10-29 PROCEDURE — 83690 ASSAY OF LIPASE: CPT

## 2022-10-29 PROCEDURE — 84484 ASSAY OF TROPONIN QUANT: CPT

## 2022-10-29 PROCEDURE — 36415 COLL VENOUS BLD VENIPUNCTURE: CPT

## 2022-10-29 PROCEDURE — 99285 EMERGENCY DEPT VISIT HI MDM: CPT

## 2022-10-29 PROCEDURE — 96374 THER/PROPH/DIAG INJ IV PUSH: CPT

## 2022-10-29 PROCEDURE — G0480 DRUG TEST DEF 1-7 CLASSES: HCPCS

## 2022-10-29 PROCEDURE — 2580000003 HC RX 258: Performed by: EMERGENCY MEDICINE

## 2022-10-29 PROCEDURE — 2709999900 CT ABDOMEN PELVIS W IV CONTRAST

## 2022-10-29 PROCEDURE — 6360000002 HC RX W HCPCS: Performed by: EMERGENCY MEDICINE

## 2022-10-29 PROCEDURE — 85025 COMPLETE CBC W/AUTO DIFF WBC: CPT

## 2022-10-29 PROCEDURE — 96361 HYDRATE IV INFUSION ADD-ON: CPT

## 2022-10-29 PROCEDURE — 93005 ELECTROCARDIOGRAM TRACING: CPT | Performed by: EMERGENCY MEDICINE

## 2022-10-29 PROCEDURE — 81001 URINALYSIS AUTO W/SCOPE: CPT

## 2022-10-29 PROCEDURE — 6360000004 HC RX CONTRAST MEDICATION: Performed by: EMERGENCY MEDICINE

## 2022-10-29 RX ORDER — SUCRALFATE 1 G/1
1 TABLET ORAL 3 TIMES DAILY
Qty: 90 TABLET | Refills: 0 | Status: SHIPPED | OUTPATIENT
Start: 2022-10-29 | End: 2022-11-28 | Stop reason: SDUPTHER

## 2022-10-29 RX ORDER — 0.9 % SODIUM CHLORIDE 0.9 %
1000 INTRAVENOUS SOLUTION INTRAVENOUS ONCE
Status: COMPLETED | OUTPATIENT
Start: 2022-10-29 | End: 2022-10-29

## 2022-10-29 RX ORDER — PANTOPRAZOLE SODIUM 40 MG/1
40 TABLET, DELAYED RELEASE ORAL DAILY
Qty: 30 TABLET | Refills: 0 | Status: SHIPPED | OUTPATIENT
Start: 2022-10-29 | End: 2022-11-28

## 2022-10-29 RX ORDER — SODIUM CHLORIDE 0.9 % (FLUSH) 0.9 %
3 SYRINGE (ML) INJECTION EVERY 8 HOURS
Status: DISCONTINUED | OUTPATIENT
Start: 2022-10-29 | End: 2022-10-29 | Stop reason: HOSPADM

## 2022-10-29 RX ORDER — ONDANSETRON 2 MG/ML
4 INJECTION INTRAMUSCULAR; INTRAVENOUS ONCE
Status: COMPLETED | OUTPATIENT
Start: 2022-10-29 | End: 2022-10-29

## 2022-10-29 RX ADMIN — IOPAMIDOL 100 ML: 755 INJECTION, SOLUTION INTRAVENOUS at 17:32

## 2022-10-29 RX ADMIN — SODIUM CHLORIDE 1000 ML: 9 INJECTION, SOLUTION INTRAVENOUS at 17:09

## 2022-10-29 RX ADMIN — ONDANSETRON 4 MG: 2 INJECTION INTRAMUSCULAR; INTRAVENOUS at 17:13

## 2022-10-29 ASSESSMENT — PAIN SCALES - GENERAL: PAINLEVEL_OUTOF10: 8

## 2022-10-29 ASSESSMENT — PAIN - FUNCTIONAL ASSESSMENT: PAIN_FUNCTIONAL_ASSESSMENT: 0-10

## 2022-10-29 ASSESSMENT — PAIN DESCRIPTION - PAIN TYPE: TYPE: ACUTE PAIN

## 2022-10-29 ASSESSMENT — PAIN DESCRIPTION - FREQUENCY: FREQUENCY: CONTINUOUS

## 2022-10-29 ASSESSMENT — PAIN DESCRIPTION - ONSET: ONSET: ON-GOING

## 2022-10-29 ASSESSMENT — PAIN DESCRIPTION - DESCRIPTORS: DESCRIPTORS: ACHING;CRAMPING

## 2022-10-29 ASSESSMENT — PAIN DESCRIPTION - ORIENTATION: ORIENTATION: MID

## 2022-10-29 ASSESSMENT — PAIN DESCRIPTION - LOCATION: LOCATION: ABDOMEN

## 2022-10-29 NOTE — ED PROVIDER NOTES
[facility] ED  EMERGENCY DEPARTMENT ENCOUNTER      Pt Name: Scarlett Mazariegos  MRN: [id number]  [place] 1965  Date of evaluation: 10/29/2022  Provider: Jooa Jacobs MD    CHIEF COMPLAINT     Chief Complaint   Patient presents with    Abdominal Pain     Onset \"about 2 weeks and by the way I've been drinking today\"         HISTORY OF PRESENT ILLNESS   (Location/Symptom, Timing/Onset, Context/Setting,Quality, Duration, Modifying Factors, Severity)  Note limiting factors. Scarlett Mazariegos is a 62 y.o. female who presents to the emergency department with a 2-week history of epigastric pain. Patient does not report vomiting. She had states she went through a bottle of Tums and a bottle of Pepto-Bismol last couple days and her stool is now black in color but is formed. She does not report lightheadedness or syncope, chest pain or shortness of breath. Patient vapes and consumes 6-7 beers on weekends. She admits to drinking beer today. The history is provided by the patient. Nursing Notes werereviewed. REVIEW OF SYSTEMS    (2-9 systems for level 4, 10 or more for level 5)     Review of Systems   All other systems reviewed and are negative. Except as noted above the remainder of the review of systems was reviewed and negative. PAST MEDICAL HISTORY     Past Medical History:   Diagnosis Date    Alcohol abuse     Carpal tunnel syndrome, bilateral     Chronic back pain     Depression with anxiety     HGSIL (high grade squamous intraepithelial lesion) on Pap smear     Insomnia     Migraine     Recreational drug use     Marijuana.     Tobacco abuse          SURGICALHISTORY       Past Surgical History:   Procedure Laterality Date    ARTHROPLASTY Right 7/13/2022    Right Taylor's ARTHROPLASTY -123 performed by Patience Ross MD at [address] [place] [address] Right 7/13/2022    Right Carpal & Cubital Tunnel Releases with ulnar nerve transposition  performed by Patience Ross MD at [facility]    COLPOSCOPY  2002    With laser cone biopsy of the cervix. TUBAL LIGATION           CURRENT MEDICATIONS       Previous Medications    ALPRAZOLAM (XANAX) 1 MG TABLET    take 1 tablet by mouth twice a day    ASPIRIN 81 MG TABLET    Take 81 mg by mouth daily    HYDROCODONE-ACETAMINOPHEN (NORCO) 5-325 MG PER TABLET    Take 1 tablet by mouth 2 times daily as needed for Pain for up to 30 days. MIDODRINE (PROAMATINE) 5 MG TABLET    Take 1 tablet by mouth 3 times daily    SIMVASTATIN (ZOCOR) 20 MG TABLET    Take 1 tablet by mouth nightly    TRAZODONE (DESYREL) 50 MG TABLET    Take 1 tablet by mouth nightly    TRIAMCINOLONE (KENALOG) 0.1 % CREAM    Apply topically 2 times daily. ALLERGIES     Naproxen and Requip [ropinirole]    FAMILY HISTORY       Family History   Problem Relation Age of Onset    Heart Attack Father          of myocardial infarction. Other Father         MS    Heart Disease Father     Diabetes Neg Hx     Glaucoma Neg Hx     Cataracts Neg Hx           SOCIAL HISTORY       Social History     Socioeconomic History    Marital status: Single     Spouse name: None    Number of children: None    Years of education: None    Highest education level: None   Tobacco Use    Smoking status: Former     Years: 37.00     Types: Cigarettes     Quit date: 2018     Years since quittin.6    Smokeless tobacco: Never    Tobacco comments:     2 cigarettes per day   Vaping Use    Vaping Use: Every day    Substances: Nicotine, Flavoring    Devices: Disposable   Substance and Sexual Activity    Alcohol use:  Yes     Alcohol/week: 0.0 standard drinks     Comment: occasionally    Drug use: No     Social Determinants of Health     Financial Resource Strain: Low Risk     Difficulty of Paying Living Expenses: Not hard at all   Food Insecurity: No Food Insecurity    Worried About [address] [employer] [address] in the Last Year: Never true    [facility] in the Last Year: Never true       SCREENINGS    Marco Coma Scale  Eye Opening: Spontaneous  Best Verbal Response: Oriented  Best Motor Response: Obeys commands  Marco Coma Scale Score: 15        PHYSICAL EXAM    (up to 7 for level 4, 8 or more for level 5)     ED Triage Vitals [10/29/22 1634]   BP Temp Temp Source Heart Rate Resp SpO2 Height Weight   130/81 96.8 °F (36 °C) Tympanic 69 18 98 % 5' 1\" (1.549 m) 155 lb (70.3 kg)       Physical Exam  Vitals reviewed. Constitutional:       General: She is not in acute distress. Appearance: She is not ill-appearing. HENT:      Head: Normocephalic. Nose: Nose normal.      Mouth/Throat:      Mouth: Mucous membranes are moist.   Eyes:      Extraocular Movements: Extraocular movements intact. Cardiovascular:      Rate and Rhythm: Normal rate and regular rhythm. Heart sounds: Normal heart sounds. Pulmonary:      Effort: Pulmonary effort is normal. No respiratory distress. Breath sounds: Normal breath sounds. Abdominal:      General: Abdomen is flat. Bowel sounds are decreased. There is no distension. Palpations: Abdomen is soft. There is no fluid wave, hepatomegaly or splenomegaly. Tenderness: There is abdominal tenderness in the epigastric area. There is no guarding or rebound. Hernia: There is no hernia in the umbilical area or ventral area. Musculoskeletal:         General: No tenderness. Cervical back: Neck supple. Right lower leg: No edema. Left lower leg: No edema. Skin:     General: Skin is warm and dry. Coloration: Skin is not pale. Findings: No rash. Neurological:      General: No focal deficit present. Mental Status: She is alert and oriented to person, place, and time. Mental status is at baseline.    Psychiatric:         Mood and Affect: Mood normal.         Behavior: Behavior normal.       DIAGNOSTIC RESULTS     EKG: All EKG's are interpreted by the Emergency Department Physician who either signs orCo-signs this chart in the absence of a cardiologist.    Sinus bradycardia with rate of 55 bpm.  Normal axis. No ischemic change. Low voltages. RADIOLOGY:     Interpretation per the Radiologist below, ifavailable at the time of this note:    CT ABDOMEN PELVIS W IV CONTRAST Additional Contrast? None   Final Result   No acute abnormality in the abdomen or pelvis. No findings to explain the   patient's upper abdominal pain. ED BEDSIDE ULTRASOUND:   Performed by ED Physician - none    LABS:  Labs Reviewed   CBC WITH AUTO DIFFERENTIAL - Abnormal; Notable for the following components:       Result Value    RBC 3.90 (*)     MCHC 34.5 (*)     All other components within normal limits   COMPREHENSIVE METABOLIC PANEL - Abnormal; Notable for the following components:    Bun/Cre Ratio 22 (*)     Sodium 131 (*)     All other components within normal limits   ETHANOL - Abnormal; Notable for the following components:    Ethanol 204 (*)     All other components within normal limits   URINALYSIS - Abnormal; Notable for the following components:    Specific Gravity, UA 1.005 (*)     All other components within normal limits   LIPASE   TROPONIN   MICROSCOPIC URINALYSIS       All other labs were within normal range ornot returned as of this dictation. EMERGENCY DEPARTMENT COURSE and DIFFERENTIAL DIAGNOSIS/MDM:   Vitals:    Vitals:    10/29/22 1634 10/29/22 1740   BP: 130/81 (!) 105/56   Pulse: 69 72   Resp: 18 16   Temp: 96.8 °F (36 °C)    TempSrc: Tympanic    SpO2: 98% 98%   Weight: 155 lb (70.3 kg)    Height: 5' 1\" (1.549 m)             Patient has a normal hemoglobin and normal lipase level. CT scan of the abdomen pelvis is nondiagnostic. Her presentation is consistent with gastritis likely aggravated by alcohol consumption. She is placed on Protonix and Carafate and is advised abstinence from alcohol.   She is to follow-up with her primary care physician next week and was told that she may have EGD in her future. She is to return anytime for worsening symptoms. MDM    CONSULTS:  None    PROCEDURES:  Unlessotherwise noted below, none     Procedures    FINAL IMPRESSION      1. Abdominal pain, epigastric          DISPOSITION/PLAN   DISPOSITION Decision To Discharge 10/29/2022 06:27:52 PM      PATIENT REFERRED TO:  Imtiaz Resendiz MD  [address] [place], [place] [phone]      AS SOON AS POSSIBLE    DISCHARGE MEDICATIONS:         Problem List:  Patient Active Problem List   Diagnosis Code    Depression F32. A    Anxiety F41.9    Chronic back pain M54.9, G89.29    Foot pain M79.673    Lesion of plantar nerve G57.60    Enthesopathy M77.9    Tobacco abuse Z72.0    HGSIL on Pap smear of cervix R87.613    HGSIL on cytologic smear of cervix R87.613    Hyperopia of both eyes with astigmatism and presbyopia H52.03, H52.203, H52.4    Chest pain R07.9    Hand arthritis M19.049           Summation      Patient Course: Discharged    ED Medicationsadministered this visit:    Medications   sodium chloride flush 0.9 % injection 3 mL (has no administration in time range)   0.9 % sodium chloride bolus (0 mLs IntraVENous Stopped 10/29/22 1814)   ondansetron (ZOFRAN) injection 4 mg (4 mg IntraVENous Given 10/29/22 1713)   iopamidol (ISOVUE-370) 76 % injection 100 mL (100 mLs IntraVENous Given 10/29/22 1732)       New Prescriptions from this visit:    New Prescriptions    PANTOPRAZOLE (PROTONIX) 40 MG TABLET    Take 1 tablet by mouth daily    SUCRALFATE (CARAFATE) 1 GM TABLET    Take 1 tablet by mouth in the morning, at noon, and at bedtime       Follow-up:  Imtiaz Resendiz MD  [address] [place] [address]      AS SOON AS POSSIBLE      Final Impression:   1.  Abdominal pain, epigastric               (Please note that portions of this note were completed with a voice recognitionprogram.  Efforts were made to edit the dictations but occasionally words are mis-transcribed.)    Summer Helton MD (electronically signed)  Attending Emergency Physician            Roxie Bauer MD  10/29/22 1570

## 2022-11-02 ENCOUNTER — OFFICE VISIT (OUTPATIENT)
Dept: ORTHOPEDIC SURGERY | Age: 57
End: 2022-11-02
Payer: COMMERCIAL

## 2022-11-02 VITALS
DIASTOLIC BLOOD PRESSURE: 68 MMHG | HEIGHT: 61 IN | WEIGHT: 150 LBS | SYSTOLIC BLOOD PRESSURE: 114 MMHG | HEART RATE: 72 BPM | BODY MASS INDEX: 28.32 KG/M2 | OXYGEN SATURATION: 99 %

## 2022-11-02 DIAGNOSIS — M79.641 RIGHT HAND PAIN: ICD-10-CM

## 2022-11-02 DIAGNOSIS — M19.049 CMC ARTHRITIS: Primary | ICD-10-CM

## 2022-11-02 DIAGNOSIS — M19.049 HAND ARTHRITIS: ICD-10-CM

## 2022-11-02 PROCEDURE — 99213 OFFICE O/P EST LOW 20 MIN: CPT | Performed by: ORTHOPAEDIC SURGERY

## 2022-11-02 NOTE — PROGRESS NOTES
History and Physical    Patient's Name/Date of Birth: Glenroy Sanderson / 1965, 62 y.o. yo    Date: November 2, 2022     PCP: Lanie Fried MD     History of Present Illness: This 28-year-old female presents now 4-month status post right Taylor's arthroplasty, carpal tunnel release and ulnar nerve decompression with anterior subcutaneous transposition, date of surgery 7/30 just 22. Patient reports he is doing very well with no residual symptoms. She does note some discomfort localized to the index PIP joint mostly feeling a tightness with flexion. She has been able to return to work with 5 pound restrictions with plans to resume her full duties mid December. Family history negative    Past Medical History:   Diagnosis Date    Alcohol abuse     Carpal tunnel syndrome, bilateral     Chronic back pain     Depression with anxiety     HGSIL (high grade squamous intraepithelial lesion) on Pap smear     Insomnia     Migraine     Recreational drug use     Marijuana. Tobacco abuse       Past Surgical History:   Procedure Laterality Date    ARTHROPLASTY Right 7/13/2022    Right Taylor's ARTHROPLASTY -123 performed by Deidre Khan MD at [address] Right 7/13/2022    Right Carpal & Cubital Tunnel Releases with ulnar nerve transposition  performed by Deidre Khan MD at [facility]  09/11/2002    With laser cone biopsy of the cervix.     TUBAL LIGATION        Allergies: Naproxen and Requip [ropinirole]     Current Outpatient Medications   Medication Sig Dispense Refill    pantoprazole (PROTONIX) 40 MG tablet Take 1 tablet by mouth daily 30 tablet 0    sucralfate (CARAFATE) 1 GM tablet Take 1 tablet by mouth in the morning, at noon, and at bedtime 90 tablet 0    ALPRAZolam (XANAX) 1 MG tablet take 1 tablet by mouth twice a day 60 tablet 0    HYDROcodone-acetaminophen (NORCO) 5-325 MG per tablet Take 1 tablet by mouth 2 times daily as needed for Pain for up to 30 days. 60 tablet 0    traZODone (DESYREL) 50 MG tablet Take 1 tablet by mouth nightly 30 tablet 11    triamcinolone (KENALOG) 0.1 % cream Apply topically 2 times daily. 80 g 2    midodrine (PROAMATINE) 5 MG tablet Take 1 tablet by mouth 3 times daily 90 tablet 3    simvastatin (ZOCOR) 20 MG tablet Take 1 tablet by mouth nightly 90 tablet 1    aspirin 81 MG tablet Take 81 mg by mouth daily       No current facility-administered medications for this visit. Social History     Tobacco Use    Smoking status: Former     Years: 37.00     Types: Cigarettes     Quit date: 2018     Years since quittin.6    Smokeless tobacco: Never    Tobacco comments:     2 cigarettes per day   Substance Use Topics    Alcohol use: Yes     Alcohol/week: 0.0 standard drinks     Comment: occasionally        Review of Systems:  Negative  Other than stated above in the HPI is negative      Physical exam:     General appearance: no acute distress  Head: NAD  Neck: supple  Lungs: No audible wheezing, respiratory rate normal  Heart: Perfusion to all extremeties  Extremities: Examination of her right upper extremity reveals no swelling. Incision site healed well. [facility] grind test was negative. She was assessed to be neurovascular tact. She did have some tenderness at the PIP joint of the index finger with near full flexion of the index finger she was able, 10.5 cm at the distal palmar flexion crease. Assessment:  Status post right Taylor's arthroplasty, carpal tunnel and ulnar nerve decompression with antisubcutaneous transposition doing very well. She probably has an element of PIP joint synovitis of the right index finger. On review she also has symptoms involving the contralateral left basilar thumb region. She wishes to address this in the new year. Plan:  Patient was instructed continue to increase her activities as tolerated.   With regards to her left upper extremity she will follow-up on as-needed basis. No orders of the defined types were placed in this encounter.               Michael Burciaga MD,MD 11/2/2022 at 12:51 PM

## 2022-11-09 ENCOUNTER — OFFICE VISIT (OUTPATIENT)
Dept: FAMILY MEDICINE CLINIC | Age: 57
End: 2022-11-09
Payer: COMMERCIAL

## 2022-11-09 VITALS
SYSTOLIC BLOOD PRESSURE: 122 MMHG | BODY MASS INDEX: 30.78 KG/M2 | WEIGHT: 163 LBS | HEIGHT: 61 IN | DIASTOLIC BLOOD PRESSURE: 64 MMHG | HEART RATE: 68 BPM

## 2022-11-09 DIAGNOSIS — Z12.39 ENCOUNTER FOR SCREENING FOR MALIGNANT NEOPLASM OF BREAST, UNSPECIFIED SCREENING MODALITY: ICD-10-CM

## 2022-11-09 DIAGNOSIS — L40.9 PSORIASIS: ICD-10-CM

## 2022-11-09 DIAGNOSIS — K29.20 ACUTE ALCOHOLIC GASTRITIS WITHOUT HEMORRHAGE: Primary | ICD-10-CM

## 2022-11-09 PROCEDURE — 99214 OFFICE O/P EST MOD 30 MIN: CPT | Performed by: FAMILY MEDICINE

## 2022-11-09 RX ORDER — SIMVASTATIN 20 MG
20 TABLET ORAL NIGHTLY
Qty: 90 TABLET | Refills: 1 | Status: SHIPPED | OUTPATIENT
Start: 2022-11-09

## 2022-11-09 RX ORDER — FLUOCINONIDE TOPICAL SOLUTION USP, 0.05% 0.5 MG/ML
SOLUTION TOPICAL
Qty: 60 ML | Refills: 5 | Status: SHIPPED | OUTPATIENT
Start: 2022-11-09

## 2022-11-09 ASSESSMENT — ENCOUNTER SYMPTOMS
EYES NEGATIVE: 1
RESPIRATORY NEGATIVE: 1
GASTROINTESTINAL NEGATIVE: 1

## 2022-11-09 NOTE — PROGRESS NOTES
Subjective:      Patient ID: Neptali Aaron is a 62 y.o. female. HPI  scheduled follow up for er visit 10/29/22 for Gi distress. She had several recent surgeries on right upper extremity and has been off work for 3 months. Just started back to work. Doing alright, still on light duty through December. Has flaky rash in the scalp in the back. Some other lesions on left foot, buttocks. Looks grossly like psoriasis. She presented to ER for abdominal pain over 2 weeks. She is using tums and pepto bismol, that seemed to help. Turned stools black. She admitted to drinking beer the day she came in.    CT with no acute findings. She admits to drinking more alcohol while off work, feeling a little depressed. Started carafate and protonix . Doing better at present. She does admit to increased alcohol intake, beer, moderate to severe at times. Heavier on the weekends. She feels she can stop alcohol without adverse consequences. 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 Tobacco abuse      Past Surgical History:   Procedure Laterality Date    ARTHROPLASTY Right 7/13/2022    Right Taylor's ARTHROPLASTY -123 performed by Dick Barfield MD at [facility] [address] Right 7/13/2022    Right Carpal & Cubital Tunnel Releases with ulnar nerve transposition  performed by Dick Barfield MD at [address] [place] [address]  09/11/2002    With laser cone biopsy of the cervix.     TUBAL LIGATION       Current Outpatient Medications   Medication Sig Dispense Refill    pantoprazole (PROTONIX) 40 MG tablet Take 1 tablet by mouth daily 30 tablet 0    sucralfate (CARAFATE) 1 GM tablet Take 1 tablet by mouth in the morning, at noon, and at bedtime 90 tablet 0    ALPRAZolam (XANAX) 1 MG tablet take 1 tablet by mouth twice a day 60 tablet 0    HYDROcodone-acetaminophen (NORCO) 5-325 MG per tablet Take 1 tablet by mouth 2 times daily as needed for Pain for up to 30 days. 60 tablet 0    traZODone (DESYREL) 50 MG tablet Take 1 tablet by mouth nightly 30 tablet 11    simvastatin (ZOCOR) 20 MG tablet Take 1 tablet by mouth nightly 90 tablet 1    aspirin 81 MG tablet Take 81 mg by mouth daily      triamcinolone (KENALOG) 0.1 % cream Apply topically 2 times daily. 80 g 2     No current facility-administered medications for this visit. Allergies   Allergen Reactions    Naproxen      Nausea/shaky feeling    Requip [Ropinirole] Other (See Comments)     Hypotension and syncope. Review of Systems   Constitutional: Negative. HENT: Negative. Eyes: Negative. Respiratory: Negative. Cardiovascular: Negative. Gastrointestinal: Negative. Genitourinary: Negative. Musculoskeletal: Negative. Skin: Negative. Neurological: Negative. Psychiatric/Behavioral: Negative. Objective:   Physical Exam  Constitutional:       General: She is not in acute distress. Appearance: She is well-developed. HENT:      Head: Normocephalic and atraumatic. Right Ear: External ear normal.      Left Ear: External ear normal.      Mouth/Throat:      Pharynx: No oropharyngeal exudate. Eyes:      General: No scleral icterus. Conjunctiva/sclera: Conjunctivae normal.   Neck:      Thyroid: No thyromegaly. Cardiovascular:      Rate and Rhythm: Normal rate and regular rhythm. Heart sounds: Normal heart sounds. No murmur heard. Pulmonary:      Effort: Pulmonary effort is normal. No respiratory distress. Breath sounds: Normal breath sounds. No wheezing. Abdominal:      General: Bowel sounds are normal. There is no distension. Palpations: Abdomen is soft. Tenderness: There is no abdominal tenderness. There is no rebound. Musculoskeletal:         General: No tenderness. Normal range of motion. Cervical back: Neck supple. Skin:     General: Skin is warm and dry. Findings: Rash (irritation with white flaky debris posterior scalp) present. No erythema. Neurological:      Mental Status: She is alert and oriented to person, place, and time. Psychiatric:         Behavior: Behavior normal.         Thought Content: Thought content normal.         Judgment: Judgment normal.     /64 (Site: Right Upper Arm, Position: Sitting, Cuff Size: Large Adult)   Pulse 68   Ht 5' 1\" (1.549 m)   Wt 163 lb (73.9 kg)   LMP 08/01/2017 (Within Months)   BMI 30.80 kg/m²     Assessment:      Encounter Diagnoses   Name Primary? Acute alcoholic gastritis without hemorrhage Yes    Encounter for screening for malignant neoplasm of breast, unspecified screening modality     Psoriasis        Suspect subacute alcohol increase for the source of gastritis symptoms. She is commiting to cutting back/off alcohol use now that she is going back to work. Plan:      Avoid alcohol and late night snacks    Cont. Protonix and carafate. Recheck prn recurrent symptoms    Updating mammogram screening. Psoriasis: rash on the posterior scalp grossly psoriasis. Scattered lesions elsewhere : left foot, buttocks.       Marisa Flannery MD

## 2022-11-18 DIAGNOSIS — M54.50 CHRONIC BILATERAL LOW BACK PAIN WITHOUT SCIATICA: ICD-10-CM

## 2022-11-18 DIAGNOSIS — G89.29 CHRONIC BILATERAL LOW BACK PAIN WITHOUT SCIATICA: ICD-10-CM

## 2022-11-18 RX ORDER — HYDROCODONE BITARTRATE AND ACETAMINOPHEN 5; 325 MG/1; MG/1
1 TABLET ORAL 2 TIMES DAILY PRN
Qty: 60 TABLET | Refills: 0 | Status: SHIPPED | OUTPATIENT
Start: 2022-11-18 | End: 2022-12-18

## 2022-11-18 NOTE — TELEPHONE ENCOUNTER
Milka Thompson called requesting a refill of the below medication which has been pended for you: [place] from [place] reviewed. NORCO 5-325 mg last filled 10/21/22 #60/30 days    Requested Prescriptions     Pending Prescriptions Disp Refills    HYDROcodone-acetaminophen (NORCO) 5-325 MG per tablet 60 tablet 0     Sig: Take 1 tablet by mouth 2 times daily as needed for Pain for up to 30 days. Last Appointment Date: 11/9/2022  Next Appointment Date: Visit date not found    Allergies   Allergen Reactions    Naproxen      Nausea/shaky feeling    Requip [Ropinirole] Other (See Comments)     Hypotension and syncope.

## 2022-11-25 ENCOUNTER — PATIENT MESSAGE (OUTPATIENT)
Dept: FAMILY MEDICINE CLINIC | Age: 57
End: 2022-11-25

## 2022-11-25 DIAGNOSIS — F41.9 ANXIETY: ICD-10-CM

## 2022-11-28 RX ORDER — SUCRALFATE 1 G/1
1 TABLET ORAL 3 TIMES DAILY
Qty: 270 TABLET | Refills: 0 | Status: SHIPPED | OUTPATIENT
Start: 2022-11-28

## 2022-11-28 RX ORDER — ALPRAZOLAM 1 MG/1
TABLET ORAL
Qty: 60 TABLET | Refills: 0 | Status: SHIPPED | OUTPATIENT
Start: 2022-11-28 | End: 2022-12-25

## 2022-11-28 NOTE — TELEPHONE ENCOUNTER
From: Ariela Handley  To: Dr. Larry Lynnet: 11/25/2022 10:36 AM EST  Subject: Refills    Xanix and sucralfate refilled please

## 2022-12-01 NOTE — DISCHARGE SUMMARY
Occupational Therapy    Patient did not return for recommended continued treatment, D/C OT services.     Krystin Zarate, JOHN PAUL, OTR/L

## 2022-12-19 RX ORDER — TRIAMCINOLONE ACETONIDE 1 MG/G
CREAM TOPICAL
Qty: 80 G | Refills: 2 | Status: SHIPPED | OUTPATIENT
Start: 2022-12-19

## 2022-12-20 ENCOUNTER — PATIENT MESSAGE (OUTPATIENT)
Dept: FAMILY MEDICINE CLINIC | Age: 57
End: 2022-12-20

## 2022-12-20 DIAGNOSIS — M54.50 CHRONIC BILATERAL LOW BACK PAIN WITHOUT SCIATICA: ICD-10-CM

## 2022-12-20 DIAGNOSIS — G89.29 CHRONIC BILATERAL LOW BACK PAIN WITHOUT SCIATICA: ICD-10-CM

## 2022-12-20 RX ORDER — HYDROCODONE BITARTRATE AND ACETAMINOPHEN 5; 325 MG/1; MG/1
1 TABLET ORAL 2 TIMES DAILY PRN
Qty: 60 TABLET | Refills: 0 | Status: SHIPPED | OUTPATIENT
Start: 2022-12-20 | End: 2023-01-19

## 2022-12-20 NOTE — TELEPHONE ENCOUNTER
From: Pily Ayers  To: Dr. Arshad Record: 12/20/2022 9:47 AM EST  Subject: Refill    Can I got my norco refilled not on my chart

## 2022-12-27 ENCOUNTER — PATIENT MESSAGE (OUTPATIENT)
Dept: FAMILY MEDICINE CLINIC | Age: 57
End: 2022-12-27

## 2022-12-27 DIAGNOSIS — F41.9 ANXIETY: ICD-10-CM

## 2022-12-28 NOTE — TELEPHONE ENCOUNTER
From: Minna Rondon  To: Dr. Shantel Tavera: 12/27/2022 5:52 PM EST  Subject: Refill    Can I got my xanix refilled

## 2022-12-29 ENCOUNTER — PATIENT MESSAGE (OUTPATIENT)
Dept: FAMILY MEDICINE CLINIC | Age: 57
End: 2022-12-29

## 2022-12-29 RX ORDER — ALPRAZOLAM 1 MG/1
TABLET ORAL
Qty: 60 TABLET | Refills: 0 | Status: SHIPPED | OUTPATIENT
Start: 2022-12-29 | End: 2023-01-24

## 2023-01-12 NOTE — TELEPHONE ENCOUNTER
OARRS reviewed  Last fill 02/17/22 #60/30days   Last OV-02/01/22  No upcoming appts scheduled Acute respiratory failure with hypoxia

## 2023-01-18 DIAGNOSIS — G89.29 CHRONIC BILATERAL LOW BACK PAIN WITHOUT SCIATICA: ICD-10-CM

## 2023-01-18 DIAGNOSIS — M54.50 CHRONIC BILATERAL LOW BACK PAIN WITHOUT SCIATICA: ICD-10-CM

## 2023-01-18 RX ORDER — HYDROCODONE BITARTRATE AND ACETAMINOPHEN 5; 325 MG/1; MG/1
1 TABLET ORAL 2 TIMES DAILY PRN
Qty: 60 TABLET | Refills: 0 | Status: SHIPPED | OUTPATIENT
Start: 2023-01-18 | End: 2023-02-17

## 2023-01-26 DIAGNOSIS — F41.9 ANXIETY: ICD-10-CM

## 2023-01-30 RX ORDER — ALPRAZOLAM 1 MG/1
TABLET ORAL
Qty: 60 TABLET | Refills: 0 | Status: SHIPPED | OUTPATIENT
Start: 2023-01-30 | End: 2023-02-26

## 2023-02-17 DIAGNOSIS — G89.29 CHRONIC BILATERAL LOW BACK PAIN WITHOUT SCIATICA: ICD-10-CM

## 2023-02-17 DIAGNOSIS — M54.50 CHRONIC BILATERAL LOW BACK PAIN WITHOUT SCIATICA: ICD-10-CM

## 2023-02-17 RX ORDER — HYDROCODONE BITARTRATE AND ACETAMINOPHEN 5; 325 MG/1; MG/1
1 TABLET ORAL 2 TIMES DAILY PRN
Qty: 60 TABLET | Refills: 0 | Status: SHIPPED | OUTPATIENT
Start: 2023-02-17 | End: 2023-03-19

## 2023-02-17 NOTE — TELEPHONE ENCOUNTER
Please confirm - pt should still have refills of Fluocinonide solution at [facility], as GO put 5 refills on last Rx sent on 11/9/22, and per dispense report, she has only filled med once in 11/2022. Controlled Substance Monitoring:    Acute and Chronic Pain Monitoring:   RX Monitoring 2/17/2023   Attestation -   Periodic Controlled Substance Monitoring No signs of potential drug abuse or diversion identified. Obtained or confirmed \"Consent for Opioid Use\" on file. Obtained or confirmed \"Medication Contract\" on file.

## 2023-02-17 NOTE — TELEPHONE ENCOUNTER
Twilla London called requesting a refill of the below medication which has been pended for you:         [place] from [place]. Hydrocodone-acetaminophen 5-325 mg last filled 1/18/23 #60/30 days. Dr. Ginette Wagner is out of the office    Requested Prescriptions     Pending Prescriptions Disp Refills    fluocinonide (LIDEX) 0.05 % external solution 60 mL 0     Sig: Apply topically 2 times daily. HYDROcodone-acetaminophen (NORCO) 5-325 MG per tablet 60 tablet 0     Sig: Take 1 tablet by mouth 2 times daily as needed for Pain for up to 30 days. Last Appointment Date: 11/9/2022  Next Appointment Date: Visit date not found    Allergies   Allergen Reactions    Naproxen      Nausea/shaky feeling    Requip [Ropinirole] Other (See Comments)     Hypotension and syncope.

## 2023-02-20 RX ORDER — FLUOCINONIDE TOPICAL SOLUTION USP, 0.05% 0.5 MG/ML
SOLUTION TOPICAL
Qty: 60 ML | Refills: 0 | OUTPATIENT
Start: 2023-02-20

## 2023-03-02 ENCOUNTER — HOSPITAL ENCOUNTER (EMERGENCY)
Age: 58
Discharge: HOME | End: 2023-03-02
Attending: EMERGENCY MEDICINE
Payer: COMMERCIAL

## 2023-03-02 ENCOUNTER — APPOINTMENT (OUTPATIENT)
Dept: CT IMAGING | Age: 58
End: 2023-03-02
Payer: COMMERCIAL

## 2023-03-02 VITALS
OXYGEN SATURATION: 97 % | HEART RATE: 60 BPM | TEMPERATURE: 99.1 F | HEIGHT: 61 IN | BODY MASS INDEX: 30.21 KG/M2 | RESPIRATION RATE: 16 BRPM | DIASTOLIC BLOOD PRESSURE: 52 MMHG | WEIGHT: 160 LBS | SYSTOLIC BLOOD PRESSURE: 110 MMHG

## 2023-03-02 DIAGNOSIS — G57.93 NEUROPATHIC PAIN OF BOTH LEGS: ICD-10-CM

## 2023-03-02 DIAGNOSIS — M54.12 CERVICAL RADICULOPATHY: Primary | ICD-10-CM

## 2023-03-02 LAB
ABSOLUTE EOS #: 0.17 K/UL (ref 0–0.44)
ABSOLUTE IMMATURE GRANULOCYTE: <0.03 K/UL (ref 0–0.3)
ABSOLUTE LYMPH #: 1.45 K/UL (ref 1.1–3.7)
ABSOLUTE MONO #: 0.63 K/UL (ref 0.1–1.2)
ALBUMIN SERPL-MCNC: 3.9 G/DL (ref 3.5–5.2)
ALBUMIN/GLOBULIN RATIO: 1.4 (ref 1–2.5)
ALP SERPL-CCNC: 84 U/L (ref 35–104)
ALT SERPL-CCNC: 20 U/L (ref 5–33)
ANION GAP SERPL CALCULATED.3IONS-SCNC: 11 MMOL/L (ref 9–17)
AST SERPL-CCNC: 14 U/L
BASOPHILS # BLD: 0 % (ref 0–2)
BASOPHILS ABSOLUTE: <0.03 K/UL (ref 0–0.2)
BILIRUB DIRECT SERPL-MCNC: 0.1 MG/DL
BILIRUB INDIRECT SERPL-MCNC: 0.3 MG/DL (ref 0–1)
BILIRUB SERPL-MCNC: 0.4 MG/DL (ref 0.3–1.2)
BUN SERPL-MCNC: 15 MG/DL (ref 6–20)
BUN/CREAT BLD: 21 (ref 9–20)
CALCIUM SERPL-MCNC: 9.3 MG/DL (ref 8.6–10.4)
CHLORIDE SERPL-SCNC: 103 MMOL/L (ref 98–107)
CO2 SERPL-SCNC: 24 MMOL/L (ref 20–31)
CREAT SERPL-MCNC: 0.7 MG/DL (ref 0.5–0.9)
EOSINOPHILS RELATIVE PERCENT: 2 % (ref 1–4)
GFR SERPL CREATININE-BSD FRML MDRD: >60 ML/MIN/1.73M2
GLOBULIN SER-MCNC: 2.7 G/DL (ref 1.5–3.8)
GLUCOSE SERPL-MCNC: 118 MG/DL (ref 70–99)
HCT VFR BLD AUTO: 40 % (ref 36.3–47.1)
HGB BLD-MCNC: 13.4 G/DL (ref 11.9–15.1)
IMMATURE GRANULOCYTES: 0 %
LYMPHOCYTES # BLD: 18 % (ref 24–43)
MAGNESIUM SERPL-MCNC: 1.8 MG/DL (ref 1.6–2.6)
MCH RBC QN AUTO: 31.2 PG (ref 25.2–33.5)
MCHC RBC AUTO-ENTMCNC: 33.5 G/DL (ref 25.2–33.5)
MCV RBC AUTO: 93 FL (ref 82.6–102.9)
MONOCYTES # BLD: 8 % (ref 3–12)
NRBC AUTOMATED: 0 PER 100 WBC
PDW BLD-RTO: 12.5 % (ref 11.8–14.4)
PLATELET # BLD AUTO: 280 K/UL (ref 138–453)
PMV BLD AUTO: 9 FL (ref 8.1–13.5)
POTASSIUM SERPL-SCNC: 4.1 MMOL/L (ref 3.7–5.3)
PROT SERPL-MCNC: 6.6 G/DL (ref 6.4–8.3)
RBC # BLD: 4.3 M/UL (ref 3.95–5.11)
SARS-COV-2 RDRP RESP QL NAA+PROBE: NOT DETECTED
SEG NEUTROPHILS: 72 % (ref 36–65)
SEGMENTED NEUTROPHILS ABSOLUTE COUNT: 5.78 K/UL (ref 1.5–8.1)
SODIUM SERPL-SCNC: 138 MMOL/L (ref 135–144)
SPECIMEN DESCRIPTION: NORMAL
TROPONIN I SERPL DL<=0.01 NG/ML-MCNC: <6 NG/L (ref 0–14)
TSH SERPL-ACNC: 1.03 UIU/ML (ref 0.3–5)
WBC # BLD AUTO: 8.1 K/UL (ref 3.5–11.3)

## 2023-03-02 PROCEDURE — 83735 ASSAY OF MAGNESIUM: CPT

## 2023-03-02 PROCEDURE — 80076 HEPATIC FUNCTION PANEL: CPT

## 2023-03-02 PROCEDURE — 72125 CT NECK SPINE W/O DYE: CPT

## 2023-03-02 PROCEDURE — 70450 CT HEAD/BRAIN W/O DYE: CPT

## 2023-03-02 PROCEDURE — 84484 ASSAY OF TROPONIN QUANT: CPT

## 2023-03-02 PROCEDURE — 99284 EMERGENCY DEPT VISIT MOD MDM: CPT

## 2023-03-02 PROCEDURE — 85025 COMPLETE CBC W/AUTO DIFF WBC: CPT

## 2023-03-02 PROCEDURE — 87635 SARS-COV-2 COVID-19 AMP PRB: CPT

## 2023-03-02 PROCEDURE — 93005 ELECTROCARDIOGRAM TRACING: CPT | Performed by: EMERGENCY MEDICINE

## 2023-03-02 PROCEDURE — 80048 BASIC METABOLIC PNL TOTAL CA: CPT

## 2023-03-02 PROCEDURE — 84443 ASSAY THYROID STIM HORMONE: CPT

## 2023-03-02 RX ORDER — ALPRAZOLAM 1 MG/1
1 TABLET ORAL NIGHTLY PRN
COMMUNITY

## 2023-03-02 RX ORDER — GABAPENTIN 100 MG/1
100 CAPSULE ORAL 2 TIMES DAILY
Qty: 20 CAPSULE | Refills: 0 | Status: SHIPPED | OUTPATIENT
Start: 2023-03-02 | End: 2023-03-12

## 2023-03-02 ASSESSMENT — PAIN - FUNCTIONAL ASSESSMENT: PAIN_FUNCTIONAL_ASSESSMENT: 0-10

## 2023-03-02 ASSESSMENT — PAIN DESCRIPTION - LOCATION: LOCATION: LEG

## 2023-03-02 ASSESSMENT — ENCOUNTER SYMPTOMS
ABDOMINAL PAIN: 0
SHORTNESS OF BREATH: 0

## 2023-03-02 ASSESSMENT — PAIN DESCRIPTION - ORIENTATION: ORIENTATION: LEFT

## 2023-03-02 ASSESSMENT — PAIN DESCRIPTION - DESCRIPTORS: DESCRIPTORS: ACHING

## 2023-03-02 NOTE — ED PROVIDER NOTES
[facility] ED  EMERGENCY DEPARTMENT ENCOUNTER      Pt Name: Milagros Simth  MRN: [id number]  [place] 1965  Date of evaluation: 3/2/2023  Provider: Uday Jones MD    CHIEF COMPLAINT     Chief Complaint   Patient presents with    Other     Legs feeling numb and tingling, also feels into whole left side from neck down too sometimes         HISTORY OF PRESENT ILLNESS   (Location/Symptom, Timing/Onset, Context/Setting,Quality, Duration, Modifying Factors, Severity)  Note limiting factors. Milagros Smith is a 62 y.o. female who presents to the emergency department stating she woke up 5 days ago with pain in both her lower legs from the knees down. Pain has persisted and is accompanied with a tingling sensation. In the last couple days she also reports some tingling of the left arm. She reports discomfort in the left side of her neck. Occasionally she has had pain in the left upper chest but not now. She denies chills or fever. She does not report headache, shortness of breath, abdominal pain urinary or bowel symptoms. The history is provided by the patient and medical records. Nursing Notes werereviewed. REVIEW OF SYSTEMS    (2-9 systems for level 4, 10 or more for level 5)     Review of Systems   Constitutional:  Positive for fatigue. Negative for fever. Respiratory:  Negative for shortness of breath. Cardiovascular:  Negative for chest pain. Gastrointestinal:  Negative for abdominal pain. Neurological:  Negative for seizures and syncope. Except as noted above the remainder of the review of systems was reviewed and negative. PAST MEDICAL HISTORY     Past Medical History:   Diagnosis Date    Alcohol abuse     Carpal tunnel syndrome, bilateral     Chronic back pain     Depression with anxiety     HGSIL (high grade squamous intraepithelial lesion) on Pap smear     Insomnia     Migraine     Recreational drug use     Marijuana.     Tobacco abuse          SURGICALHISTORY Past Surgical History:   Procedure Laterality Date    ARTHROPLASTY Right 2022    Right Taylor's ARTHROPLASTY -123 performed by Nkechi Ramon MD at [facility] [address] Right 2022    Right Carpal & Cubital Tunnel Releases with ulnar nerve transposition  performed by Nkechi Ramon MD at [address]  2002    With laser cone biopsy of the cervix. TUBAL LIGATION           CURRENT MEDICATIONS       Previous Medications    ALPRAZOLAM (XANAX) 1 MG TABLET    Take 1 mg by mouth nightly as needed for Sleep. ASPIRIN 81 MG TABLET    Take 81 mg by mouth daily    FLUOCINONIDE (LIDEX) 0.05 % EXTERNAL SOLUTION    Apply topically 2 times daily. HYDROCODONE-ACETAMINOPHEN (NORCO) 5-325 MG PER TABLET    Take 1 tablet by mouth 2 times daily as needed for Pain for up to 30 days. PANTOPRAZOLE (PROTONIX) 40 MG TABLET    Take 1 tablet by mouth daily    SIMVASTATIN (ZOCOR) 20 MG TABLET    Take 1 tablet by mouth nightly    SUCRALFATE (CARAFATE) 1 GM TABLET    Take 1 tablet by mouth in the morning, at noon, and at bedtime    TRAZODONE (DESYREL) 50 MG TABLET    Take 1 tablet by mouth nightly    TRIAMCINOLONE (KENALOG) 0.1 % CREAM    Apply topically 2 times daily. ALLERGIES     Naproxen and Requip [ropinirole]    FAMILY HISTORY       Family History   Problem Relation Age of Onset    Heart Attack Father          of myocardial infarction. Other Father         MS    Heart Disease Father     Diabetes Neg Hx     Glaucoma Neg Hx     Cataracts Neg Hx           SOCIAL HISTORY       Social History     Socioeconomic History    Marital status: Single   Tobacco Use    Smoking status: Former     Years: 37.00     Types: Cigarettes     Quit date: 2018     Years since quittin.0    Smokeless tobacco: Never    Tobacco comments:     2 cigarettes per day. Quit in July .  Vapes   Vaping Use    Vaping Use: Every day    Substances: Nicotine, Flavoring Devices: Disposable   Substance and Sexual Activity    Alcohol use: Yes     Alcohol/week: 0.0 standard drinks     Comment: occasionally    Drug use: No     Social Determinants of Health     Financial Resource Strain: Low Risk     Difficulty of Paying Living Expenses: Not hard at all   Food Insecurity: No Food Insecurity    Worried About [address] in the Last Year: Never true    [employer] in the Last Year: Never true       SCREENINGS    Marco Coma Scale  Eye Opening: Spontaneous  Best Verbal Response: Oriented  Best Motor Response: Obeys commands  [place] Coma Scale Score: 15        PHYSICAL EXAM    (up to 7 for level 4, 8 or more for level 5)     ED Triage Vitals [03/02/23 1312]   BP Temp Temp Source Heart Rate Resp SpO2 Height Weight   137/62 99.1 °F (37.3 °C) Tympanic 74 16 98 % 5' 1\" (1.549 m) 160 lb (72.6 kg)       Physical Exam  Vitals reviewed. Constitutional:       General: She is not in acute distress. Appearance: She is not ill-appearing. HENT:      Head: Normocephalic. Right Ear: External ear normal.      Left Ear: External ear normal.      Nose: Nose normal.      Mouth/Throat:      Mouth: Mucous membranes are moist.   Eyes:      Extraocular Movements: Extraocular movements intact. Pupils: Pupils are equal, round, and reactive to light. Cardiovascular:      Rate and Rhythm: Normal rate and regular rhythm. Heart sounds: Normal heart sounds. Pulmonary:      Effort: Pulmonary effort is normal. No respiratory distress. Breath sounds: Normal breath sounds. Abdominal:      Palpations: Abdomen is soft. Tenderness: There is no abdominal tenderness. Musculoskeletal:         General: No tenderness. Cervical back: Neck supple. Right lower leg: No edema. Left lower leg: No edema. Comments: Neck is supple and she has a negative Spurling sign. There is no midline cervical spine tenderness.   Tenderness localizes in the left paracervical soft tissue. Tone and power are normal and symmetric in the upper and lower extremities with subjective hypoesthesia in both lower legs from the knees below. Skin:     General: Skin is warm and dry. Coloration: Skin is not pale. Neurological:      General: No focal deficit present. Mental Status: She is alert and oriented to person, place, and time. Mental status is at baseline. Psychiatric:         Mood and Affect: Mood normal.         Behavior: Behavior normal.       DIAGNOSTIC RESULTS     EKG: All EKG's are interpreted by the Emergency Department Physician who either signs orCo-signs this chart in the absence of a cardiologist.    Normal sinus rhythm with a rate of 75 beats a minute. Normal axis. No ischemic change. RADIOLOGY:     Interpretation per the Radiologist below, ifavailable at the time of this note:    CT CSpine W/O Contrast   Final Result   1. No acute cervical spine fracture. 2. Moderate to severe degenerative disc disease at C5-C6 and multilevel facet   arthropathy as described. There is no central canal narrowing. Osseous   neural foraminal narrowing is moderate on the left at C4-C5 and C5-C6 and   mild on the right at C3-C4. 3. Straightening of the spine which could be related to patient positioning   or muscle spasm. CT Head W/O Contrast   Final Result   No acute intracranial findings.                ED BEDSIDE ULTRASOUND:   Performed by ED Physician - none    LABS:  Labs Reviewed   CBC WITH AUTO DIFFERENTIAL - Abnormal; Notable for the following components:       Result Value    Seg Neutrophils 72 (*)     Lymphocytes 18 (*)     All other components within normal limits   BASIC METABOLIC PANEL - Abnormal; Notable for the following components:    Glucose 118 (*)     Bun/Cre Ratio 21 (*)     All other components within normal limits   COVID-19, RAPID   HEPATIC FUNCTION PANEL   MAGNESIUM   TSH   TROPONIN       All other labs were within normal range ornot returned as of this dictation. EMERGENCY DEPARTMENT COURSE and DIFFERENTIAL DIAGNOSIS/MDM:   Vitals:    Vitals:    03/02/23 1312 03/02/23 1330   BP: 137/62 120/60   Pulse: 74    Resp: 16    Temp: 99.1 °F (37.3 °C)    TempSrc: Tympanic    SpO2: 98% 96%   Weight: 160 lb (72.6 kg)    Height: 5' 1\" (1.549 m)             CT scan of the brain is negative for acute findings. CT scan of the cervical spine reports degenerative changes with worsening neural foraminal narrowing on the left side likely resulting in her left-sided symptoms of paresthesia I and pain in the neck left upper chest and left arm. Magnesium level is low normal.  The rest of the labs unremarkable. Patient presents with neuropathic pain in both lower extremities likely secondary to peripheral neuropathy. She is advised to take an over-the-counter magnesium tablet every night and is placed on gabapentin 100 mg twice a day for 10 days. She has a follow-up appointment with her primary care physician in 4 days from now. CONSULTS:  None    PROCEDURES:  Unlessotherwise noted below, none     Procedures    FINAL IMPRESSION      1. Cervical radiculopathy    2. Neuropathic pain of both legs          DISPOSITION/PLAN   DISPOSITION Decision To Discharge 03/02/2023 03:18:05 PM      PATIENT REFERRED TO:  Kimani Denis MD  [place] [id number] [phone]    In 4 days  As scheduled    DISCHARGE MEDICATIONS:         Problem List:  Patient Active Problem List   Diagnosis Code    Depression F32. A    Anxiety F41.9    Chronic back pain M54.9, G89.29    Foot pain M79.673    Lesion of plantar nerve G57.60    Enthesopathy M77.9    Tobacco abuse Z72.0    HGSIL on Pap smear of cervix R87.613    HGSIL on cytologic smear of cervix R87.613    Hyperopia of both eyes with astigmatism and presbyopia H52.03, H52.203, H52.4    Chest pain R07.9    Hand arthritis M19.049           Summation      Patient Course: Discharged    ED Medicationsadministered this visit: Medications - No data to display    New Prescriptions from this visit:    New Prescriptions    GABAPENTIN (NEURONTIN) 100 MG CAPSULE    Take 1 capsule by mouth 2 times daily for 10 days. Intended supply: 90 days       Follow-up:  Hyacinth Cummings MD  [address] [place] [address]  [phone]    In 4 days  As scheduled      Final Impression:   1. Cervical radiculopathy    2.  Neuropathic pain of both legs               (Please note that portions of this note were completed with a voice recognitionprogram.  Efforts were made to edit the dictations but occasionally words are mis-transcribed.)    Cathy Carrillo MD (electronically signed)  Attending Emergency Physician            Cathy Carrillo MD  03/02/23 5715

## 2023-03-03 ENCOUNTER — PATIENT MESSAGE (OUTPATIENT)
Dept: FAMILY MEDICINE CLINIC | Age: 58
End: 2023-03-03

## 2023-03-03 DIAGNOSIS — F41.9 ANXIETY: ICD-10-CM

## 2023-03-03 LAB
EKG ATRIAL RATE: 75 BPM
EKG P AXIS: 22 DEGREES
EKG P-R INTERVAL: 154 MS
EKG Q-T INTERVAL: 392 MS
EKG QRS DURATION: 76 MS
EKG QTC CALCULATION (BAZETT): 437 MS
EKG R AXIS: 52 DEGREES
EKG T AXIS: 17 DEGREES
EKG VENTRICULAR RATE: 75 BPM

## 2023-03-03 RX ORDER — ALPRAZOLAM 1 MG/1
TABLET ORAL
Qty: 60 TABLET | Refills: 0 | Status: SHIPPED | OUTPATIENT
Start: 2023-03-03 | End: 2023-03-30

## 2023-03-03 NOTE — TELEPHONE ENCOUNTER
Dayo Bartlett called requesting a refill of the below medication which has been pended for you:     [place] from [place] reviewed. Alprazolam 1 mg last filled 1/30/23 #60/30 days    Requested Prescriptions     Pending Prescriptions Disp Refills    ALPRAZolam (XANAX) 1 MG tablet 60 tablet 0       Last Appointment Date: 11/9/2022  Next Appointment Date: 3/6/2023    Allergies   Allergen Reactions    Naproxen      Nausea/shaky feeling    Requip [Ropinirole] Other (See Comments)     Hypotension and syncope.    /

## 2023-03-03 NOTE — TELEPHONE ENCOUNTER
From: Jojo Snow  To: Dr. Stearns Fall: 3/3/2023 9:45 AM EST  Subject: Refill     Xanix can I get refill

## 2023-03-06 ENCOUNTER — OFFICE VISIT (OUTPATIENT)
Dept: FAMILY MEDICINE CLINIC | Age: 58
End: 2023-03-06
Payer: COMMERCIAL

## 2023-03-06 VITALS
BODY MASS INDEX: 30.58 KG/M2 | WEIGHT: 162 LBS | HEIGHT: 61 IN | HEART RATE: 72 BPM | DIASTOLIC BLOOD PRESSURE: 70 MMHG | SYSTOLIC BLOOD PRESSURE: 126 MMHG

## 2023-03-06 DIAGNOSIS — M79.606 LOWER EXTREMITY PAIN, DIFFUSE, UNSPECIFIED LATERALITY: Primary | ICD-10-CM

## 2023-03-06 PROCEDURE — 99214 OFFICE O/P EST MOD 30 MIN: CPT | Performed by: FAMILY MEDICINE

## 2023-03-06 RX ORDER — MAGNESIUM 30 MG
30 TABLET ORAL DAILY
COMMUNITY

## 2023-03-06 RX ORDER — FLUOCINONIDE TOPICAL SOLUTION USP, 0.05% 0.5 MG/ML
SOLUTION TOPICAL
Qty: 60 ML | Refills: 5 | Status: SHIPPED | OUTPATIENT
Start: 2023-03-06

## 2023-03-06 SDOH — ECONOMIC STABILITY: FOOD INSECURITY: WITHIN THE PAST 12 MONTHS, YOU WORRIED THAT YOUR FOOD WOULD RUN OUT BEFORE YOU GOT MONEY TO BUY MORE.: NEVER TRUE

## 2023-03-06 SDOH — ECONOMIC STABILITY: INCOME INSECURITY: HOW HARD IS IT FOR YOU TO PAY FOR THE VERY BASICS LIKE FOOD, HOUSING, MEDICAL CARE, AND HEATING?: NOT HARD AT ALL

## 2023-03-06 SDOH — ECONOMIC STABILITY: HOUSING INSECURITY
IN THE LAST 12 MONTHS, WAS THERE A TIME WHEN YOU DID NOT HAVE A STEADY PLACE TO SLEEP OR SLEPT IN A SHELTER (INCLUDING NOW)?: NO

## 2023-03-06 SDOH — ECONOMIC STABILITY: FOOD INSECURITY: WITHIN THE PAST 12 MONTHS, THE FOOD YOU BOUGHT JUST DIDN'T LAST AND YOU DIDN'T HAVE MONEY TO GET MORE.: NEVER TRUE

## 2023-03-06 ASSESSMENT — PATIENT HEALTH QUESTIONNAIRE - PHQ9
SUM OF ALL RESPONSES TO PHQ QUESTIONS 1-9: 0
2. FEELING DOWN, DEPRESSED OR HOPELESS: 0
6. FEELING BAD ABOUT YOURSELF - OR THAT YOU ARE A FAILURE OR HAVE LET YOURSELF OR YOUR FAMILY DOWN: 0
1. LITTLE INTEREST OR PLEASURE IN DOING THINGS: 0
9. THOUGHTS THAT YOU WOULD BE BETTER OFF DEAD, OR OF HURTING YOURSELF: 0
SUM OF ALL RESPONSES TO PHQ QUESTIONS 1-9: 0
5. POOR APPETITE OR OVEREATING: 0
8. MOVING OR SPEAKING SO SLOWLY THAT OTHER PEOPLE COULD HAVE NOTICED. OR THE OPPOSITE, BEING SO FIGETY OR RESTLESS THAT YOU HAVE BEEN MOVING AROUND A LOT MORE THAN USUAL: 0
SUM OF ALL RESPONSES TO PHQ QUESTIONS 1-9: 0
4. FEELING TIRED OR HAVING LITTLE ENERGY: 0
7. TROUBLE CONCENTRATING ON THINGS, SUCH AS READING THE NEWSPAPER OR WATCHING TELEVISION: 0
SUM OF ALL RESPONSES TO PHQ QUESTIONS 1-9: 0
3. TROUBLE FALLING OR STAYING ASLEEP: 0
10. IF YOU CHECKED OFF ANY PROBLEMS, HOW DIFFICULT HAVE THESE PROBLEMS MADE IT FOR YOU TO DO YOUR WORK, TAKE CARE OF THINGS AT HOME, OR GET ALONG WITH OTHER PEOPLE: 0
SUM OF ALL RESPONSES TO PHQ9 QUESTIONS 1 & 2: 0

## 2023-03-06 ASSESSMENT — ENCOUNTER SYMPTOMS
BACK PAIN: 0
RESPIRATORY NEGATIVE: 1
GASTROINTESTINAL NEGATIVE: 1
EYES NEGATIVE: 1

## 2023-03-06 NOTE — PROGRESS NOTES
Subjective:      Patient ID: Anthony Dunne is a 62 y.o. female. HPI  acute visit for about a week of pain in the legs from the knees down. Woke up Sunday and noted pain and a sense of weakness from the knees down in both legs. Using compression hose, not really seeing swelling. Left leg seems worse. Sense of cramping sensation, without an actual cramp. No back pain. Seen in the ER and started on magnesium and gabapentin. Some tingling sensation in the legs described. CT of head and cervical spine, but not of lumbar spine. 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 Tobacco abuse      Past Surgical History:   Procedure Laterality Date    ARTHROPLASTY Right 7/13/2022    Right Taylor's ARTHROPLASTY -123 performed by Jefferson Perry MD at [address] Right 7/13/2022    Right Carpal & Cubital Tunnel Releases with ulnar nerve transposition  performed by Jefferson Perry MD at [facility]  09/11/2002    With laser cone biopsy of the cervix. TUBAL LIGATION       Current Outpatient Medications   Medication Sig Dispense Refill    magnesium 30 MG tablet Take 30 mg by mouth daily      ALPRAZolam (XANAX) 1 MG tablet take 1 tablet by mouth twice a day 60 tablet 0    gabapentin (NEURONTIN) 100 MG capsule Take 1 capsule by mouth 2 times daily for 10 days. Intended supply: 90 days 20 capsule 0    HYDROcodone-acetaminophen (NORCO) 5-325 MG per tablet Take 1 tablet by mouth 2 times daily as needed for Pain for up to 30 days. 60 tablet 0    triamcinolone (KENALOG) 0.1 % cream Apply topically 2 times daily.  80 g 2    sucralfate (CARAFATE) 1 GM tablet Take 1 tablet by mouth in the morning, at noon, and at bedtime 270 tablet 0    simvastatin (ZOCOR) 20 MG tablet Take 1 tablet by mouth nightly 90 tablet 1    fluocinonide (LIDEX) 0.05 % external solution Apply topically 2 times daily. 60 mL 5    pantoprazole (PROTONIX) 40 MG tablet Take 1 tablet by mouth daily 30 tablet 0    traZODone (DESYREL) 50 MG tablet Take 1 tablet by mouth nightly 30 tablet 11    aspirin 81 MG tablet Take 81 mg by mouth daily       No current facility-administered medications for this visit. Allergies   Allergen Reactions    Naproxen      Nausea/shaky feeling    Requip [Ropinirole] Other (See Comments)     Hypotension and syncope. Review of Systems   Constitutional: Negative. HENT: Negative. Eyes: Negative. Respiratory: Negative. Cardiovascular: Negative. Gastrointestinal: Negative. Genitourinary: Negative. Musculoskeletal:  Positive for myalgias (lower legs). Negative for back pain. Skin: Negative. Neurological:  Positive for weakness (left leg >right leg) and numbness. Psychiatric/Behavioral: Negative. Objective:   Physical Exam  Constitutional:       General: She is not in acute distress. Appearance: She is well-developed. HENT:      Head: Normocephalic and atraumatic. Right Ear: External ear normal.      Left Ear: External ear normal.      Mouth/Throat:      Pharynx: No oropharyngeal exudate. Eyes:      General: No scleral icterus. Conjunctiva/sclera: Conjunctivae normal.   Neck:      Thyroid: No thyromegaly. Cardiovascular:      Rate and Rhythm: Normal rate and regular rhythm. Heart sounds: Normal heart sounds. No murmur heard. Pulmonary:      Effort: Pulmonary effort is normal. No respiratory distress. Breath sounds: Normal breath sounds. No wheezing. Abdominal:      General: Bowel sounds are normal. There is no distension. Palpations: Abdomen is soft. Tenderness: There is no abdominal tenderness. There is no rebound.    Musculoskeletal:         General: No tenderness. Normal range of motion. Cervical back: Neck supple. Skin:     General: Skin is warm and dry. Findings: No erythema or rash. Neurological:      Mental Status: She is alert and oriented to person, place, and time. Psychiatric:         Behavior: Behavior normal.         Thought Content: Thought content normal.         Judgment: Judgment normal.     /70 (Site: Left Upper Arm, Position: Sitting, Cuff Size: Large Adult)   Pulse 72   Ht 5' 1\" (1.549 m)   Wt 162 lb (73.5 kg)   LMP 08/01/2017 (Within Months)   BMI 30.61 kg/m²     Assessment:      Acute onset of bilateral lower leg pain and sense of weakness, left greater then right. Symptoms on ongoing in the last week. No lower back pain. Symptoms improved with sitting, but stiff and a sense of dragging the left leg after getting up. More numbness/tingling/neuropathy symptoms at the same time. Plan:      CT head and neck negative in the er. Suspect CT of the lumbar spine would be more likely to detect mechanical impingement symptoms. She asks for work release.        Naty Caputo MD

## 2023-03-09 ENCOUNTER — HOSPITAL ENCOUNTER (OUTPATIENT)
Dept: CT IMAGING | Age: 58
Discharge: HOME | End: 2023-03-11
Payer: COMMERCIAL

## 2023-03-09 ENCOUNTER — HOSPITAL ENCOUNTER (OUTPATIENT)
Dept: MAMMOGRAPHY | Age: 58
Discharge: HOME | End: 2023-03-11
Payer: COMMERCIAL

## 2023-03-09 DIAGNOSIS — Z12.39 ENCOUNTER FOR SCREENING FOR MALIGNANT NEOPLASM OF BREAST, UNSPECIFIED SCREENING MODALITY: ICD-10-CM

## 2023-03-09 DIAGNOSIS — R92.8 ABNORMAL MAMMOGRAM OF LEFT BREAST: Primary | ICD-10-CM

## 2023-03-09 DIAGNOSIS — M79.606 LOWER EXTREMITY PAIN, DIFFUSE, UNSPECIFIED LATERALITY: ICD-10-CM

## 2023-03-09 PROCEDURE — 77067 SCR MAMMO BI INCL CAD: CPT

## 2023-03-09 PROCEDURE — 72131 CT LUMBAR SPINE W/O DYE: CPT

## 2023-03-10 DIAGNOSIS — R92.8 ABNORMAL MAMMOGRAM OF LEFT BREAST: Primary | ICD-10-CM

## 2023-03-13 ENCOUNTER — HOSPITAL ENCOUNTER (OUTPATIENT)
Dept: MAMMOGRAPHY | Age: 58
Discharge: HOME | End: 2023-03-15
Payer: COMMERCIAL

## 2023-03-13 ENCOUNTER — HOSPITAL ENCOUNTER (OUTPATIENT)
Dept: ULTRASOUND IMAGING | Age: 58
Discharge: HOME | End: 2023-03-15
Payer: COMMERCIAL

## 2023-03-13 ENCOUNTER — TELEPHONE (OUTPATIENT)
Dept: MAMMOGRAPHY | Age: 58
End: 2023-03-13

## 2023-03-13 DIAGNOSIS — G62.9 NEUROPATHY: Primary | ICD-10-CM

## 2023-03-13 DIAGNOSIS — R92.8 ABNORMAL MAMMOGRAM OF LEFT BREAST: ICD-10-CM

## 2023-03-13 PROCEDURE — 77065 DX MAMMO INCL CAD UNI: CPT

## 2023-03-13 PROCEDURE — 76642 ULTRASOUND BREAST LIMITED: CPT

## 2023-03-13 RX ORDER — GABAPENTIN 100 MG/1
100 CAPSULE ORAL 2 TIMES DAILY
Qty: 60 CAPSULE | Refills: 0 | Status: SHIPPED | OUTPATIENT
Start: 2023-03-13 | End: 2023-04-12

## 2023-03-13 NOTE — TELEPHONE ENCOUNTER
Patient received prescription from emergency room for numbness in legs for a 10 day supply. Requesting new refill be sent to local pharmacy.

## 2023-03-14 ENCOUNTER — PATIENT MESSAGE (OUTPATIENT)
Dept: FAMILY MEDICINE CLINIC | Age: 58
End: 2023-03-14

## 2023-03-14 DIAGNOSIS — M54.50 LUMBAR PAIN: Primary | ICD-10-CM

## 2023-03-14 NOTE — TELEPHONE ENCOUNTER
From: Glenroy Sanderson  To: Dr. Delores Julio: 3/14/2023 10:31 AM EDT  Subject: Test     Read my ct scan where are we going with this.would really like to get this figured out. Still having proublems with legs. what other test cane we do to figure this out

## 2023-03-17 DIAGNOSIS — G89.29 CHRONIC BILATERAL LOW BACK PAIN WITHOUT SCIATICA: ICD-10-CM

## 2023-03-17 DIAGNOSIS — M54.50 CHRONIC BILATERAL LOW BACK PAIN WITHOUT SCIATICA: ICD-10-CM

## 2023-03-20 RX ORDER — HYDROCODONE BITARTRATE AND ACETAMINOPHEN 5; 325 MG/1; MG/1
1 TABLET ORAL 2 TIMES DAILY PRN
Qty: 60 TABLET | Refills: 0 | Status: SHIPPED | OUTPATIENT
Start: 2023-03-20 | End: 2023-04-19

## 2023-03-20 RX ORDER — TRAZODONE HYDROCHLORIDE 50 MG/1
50 TABLET ORAL NIGHTLY
Qty: 30 TABLET | Refills: 11 | Status: SHIPPED | OUTPATIENT
Start: 2023-03-20

## 2023-03-21 ENCOUNTER — INITIAL CONSULT (OUTPATIENT)
Dept: SURGERY | Age: 58
End: 2023-03-21
Payer: COMMERCIAL

## 2023-03-21 VITALS
BODY MASS INDEX: 30.62 KG/M2 | WEIGHT: 162.2 LBS | OXYGEN SATURATION: 97 % | HEIGHT: 61 IN | HEART RATE: 82 BPM | DIASTOLIC BLOOD PRESSURE: 82 MMHG | SYSTOLIC BLOOD PRESSURE: 122 MMHG

## 2023-03-21 DIAGNOSIS — R92.8 BREAST LESION ON MAMMOGRAPHY: Primary | ICD-10-CM

## 2023-03-21 PROCEDURE — 99204 OFFICE O/P NEW MOD 45 MIN: CPT | Performed by: SURGERY

## 2023-03-21 NOTE — PROGRESS NOTES
Breast Evaluation Work Sheet    3/21/2023    Patient:Daija Pope               :1965    Race:     Age of Menarche: 15 y/o     Age of 1st live Birth (zero if no live births):23 y/o    Number of Pregnancies: 3  Number of live births: 3    LMP: about 6 years ago     Number of previous breast biopsies: 0  Any with atypical pathology n/a    History DCIS or LCIS: No    Personal History of other Cancers: No   Type: n/a    Family History Breast Cancer and Age of onset:     Mother No               Sister(s) No    Maternal GM No      Daughter(s) No    Paternal GM Yes       Other(s) no    History of other breast problems:    Nipple Drainage: No      Color:n/a Frequency: n/a     Spontaneous: n/a   Cyst(s): No    Pain: N/A    Skin Changes: no     Never Breastfeed    No birth control
mouth 2 times daily for 30 days. Intended supply: 90 days 60 capsule 0    magnesium 30 MG tablet Take 30 mg by mouth daily      fluocinonide (LIDEX) 0.05 % external solution Apply topically 2 times daily. 60 mL 5    ALPRAZolam (XANAX) 1 MG tablet take 1 tablet by mouth twice a day 60 tablet 0    triamcinolone (KENALOG) 0.1 % cream Apply topically 2 times daily. 80 g 2    sucralfate (CARAFATE) 1 GM tablet Take 1 tablet by mouth in the morning, at noon, and at bedtime 270 tablet 0    simvastatin (ZOCOR) 20 MG tablet Take 1 tablet by mouth nightly 90 tablet 1    pantoprazole (PROTONIX) 40 MG tablet Take 1 tablet by mouth daily 30 tablet 0    aspirin 81 MG tablet Take 81 mg by mouth daily (Patient not taking: Reported on 3/21/2023)       No current facility-administered medications for this visit. Allergies   Allergen Reactions    Naproxen      Nausea/shaky feeling    Requip [Ropinirole] Other (See Comments)     Hypotension and syncope. Family History   Problem Relation Age of Onset    Heart Attack Father          of myocardial infarction. Other Father         MS    Heart Disease Father     Diabetes Neg Hx     Glaucoma Neg Hx     Cataracts Neg Hx        Social History     Socioeconomic History    Marital status: Single     Spouse name: Not on file    Number of children: Not on file    Years of education: Not on file    Highest education level: Not on file   Occupational History    Not on file   Tobacco Use    Smoking status: Former     Years: 37.00     Types: Cigarettes     Quit date: 2018     Years since quittin.0    Smokeless tobacco: Never    Tobacco comments:     2 cigarettes per day. Quit in July . Vapes   Vaping Use    Vaping Use: Every day    Substances: Nicotine, Flavoring    Devices: Disposable   Substance and Sexual Activity    Alcohol use:  Yes     Alcohol/week: 0.0 standard drinks     Comment: occasionally    Drug use: No    Sexual activity: Not on file   Other Topics Concern

## 2023-03-21 NOTE — ASSESSMENT & PLAN NOTE
Patient has a newly found BI-RADS 4A lesion in the left breast.  Has not had any noticeable changes in the breast.  We had a discussion in the office today about the breast imaging and the concerns have been raised. We also discussed the neck steps including watchful waiting versus biopsy. We are can go ahead and proceed with biopsy at this time. We will plan for image guided needle biopsy with clip placement. This will be arranged with radiology. I will plan to see the patient back in the office after biopsy results to discuss further and for planning based on those results.

## 2023-03-27 ENCOUNTER — HOSPITAL ENCOUNTER (OUTPATIENT)
Age: 58
Setting detail: SPECIMEN
Discharge: HOME | End: 2023-03-27
Payer: COMMERCIAL

## 2023-03-27 ENCOUNTER — HOSPITAL ENCOUNTER (OUTPATIENT)
Dept: MAMMOGRAPHY | Age: 58
Discharge: HOME | End: 2023-03-29
Payer: COMMERCIAL

## 2023-03-27 ENCOUNTER — HOSPITAL ENCOUNTER (OUTPATIENT)
Dept: ULTRASOUND IMAGING | Age: 58
Discharge: HOME | End: 2023-03-29
Payer: COMMERCIAL

## 2023-03-27 DIAGNOSIS — R92.8 BREAST LESION ON MAMMOGRAPHY: ICD-10-CM

## 2023-03-27 PROCEDURE — 19083 BX BREAST 1ST LESION US IMAG: CPT

## 2023-03-27 PROCEDURE — 77065 DX MAMMO INCL CAD UNI: CPT

## 2023-03-27 PROCEDURE — 88305 TISSUE EXAM BY PATHOLOGIST: CPT

## 2023-03-29 ENCOUNTER — TELEPHONE (OUTPATIENT)
Dept: MRI IMAGING | Age: 58
End: 2023-03-29

## 2023-03-29 ENCOUNTER — HOSPITAL ENCOUNTER (OUTPATIENT)
Dept: MRI IMAGING | Age: 58
Discharge: HOME | End: 2023-03-31

## 2023-03-29 DIAGNOSIS — M54.50 LUMBAR PAIN: ICD-10-CM

## 2023-03-29 DIAGNOSIS — M54.50 LUMBAR PAIN: Primary | ICD-10-CM

## 2023-03-29 DIAGNOSIS — M51.16 RADICULOPATHY DUE TO LUMBAR INTERVERTEBRAL DISC DISORDER: ICD-10-CM

## 2023-03-29 LAB — SURGICAL PATHOLOGY REPORT: NORMAL

## 2023-03-29 NOTE — TELEPHONE ENCOUNTER
April Arnold was unable to do the MRI of the Lumbar spine due to claustrophobia. Please contact patient and advise her of her options.     Thank you    Alina Ball

## 2023-04-04 DIAGNOSIS — F41.9 ANXIETY: ICD-10-CM

## 2023-04-04 RX ORDER — ALPRAZOLAM 1 MG/1
TABLET ORAL
Qty: 60 TABLET | Refills: 0 | OUTPATIENT
Start: 2023-04-04 | End: 2023-05-01

## 2023-04-05 DIAGNOSIS — F41.9 ANXIETY: ICD-10-CM

## 2023-04-06 RX ORDER — ALPRAZOLAM 1 MG/1
TABLET ORAL
Qty: 60 TABLET | Refills: 0 | OUTPATIENT
Start: 2023-04-06 | End: 2023-05-02

## 2023-04-19 DIAGNOSIS — M54.50 CHRONIC BILATERAL LOW BACK PAIN WITHOUT SCIATICA: ICD-10-CM

## 2023-04-19 DIAGNOSIS — G89.29 CHRONIC BILATERAL LOW BACK PAIN WITHOUT SCIATICA: ICD-10-CM

## 2023-04-19 RX ORDER — HYDROCODONE BITARTRATE AND ACETAMINOPHEN 5; 325 MG/1; MG/1
1 TABLET ORAL 2 TIMES DAILY PRN
Qty: 60 TABLET | Refills: 0 | Status: SHIPPED | OUTPATIENT
Start: 2023-04-19 | End: 2023-05-19

## 2023-04-21 DIAGNOSIS — G62.9 NEUROPATHY: ICD-10-CM

## 2023-04-22 NOTE — FLOWSHEET NOTE
[facility]    [x] [place]  Phone: [phone]  Fax: [phone]      [] [place]  Phone: [phone]  Fax: [phone]    Occupational Therapy Daily Treatment Note  Date:  2022    Patient Name:  Mere Pop    :  1965  MRN: [id number]  Restrictions/Precautions:      Medical/Treatment Diagnosis Information:   Diagnosis: Status post carpal tunnel release; CMC arthritis      Insurance/Certification information:   Physician Information: Sara Taylor MD  Plan of care signed (Y/N):  n    Visit# / total visits:      Pain level: Pt denies pain this date     Progress Note: [x]  Yes  []  No  Next due by: Visit #10      Date of evaluation/re-evaluation: 22-22    Time In: 1448  Time Out:  1050    Subjective:   Pt is a 63 yo female status post right Taylor's arthroplasty, carpal tunnel and ulnar nerve decompression with anterior subcutaneous transposition on 22. Pt wearing CMC brace upon OT arrival with complaint of discomfort. Pt educated that the brace was to be used on an as needed basis and if it is bothering her she may take it off. Pt educated that it would still be good to use with increased use of R hand and when lifting objects. Objective/Assessment:   Initial evaluation completed this date, see progress note for details. Issued small isotoner glove and elastomer for scars on carpal tunnel release and Taylor's arthroplasty. Pt educated to wear at night to reduce scar tissue build up and edema in R hand. Pt verbalized good understanding.      Exercises:   Exercise/Equipment Resistance/Repetitions Other comments                                                                          Therapeutic Exercise  [] Provided verbal/tactile cueing for activities related to strengthening, flexibility, endurance, ROM. (25670)  Neuro  Re-Ed  [] Provided verbal/tactile cueing for activities related to improving balance, [facility]    Medicine Progress Note - Hospitalist Service        Date of Admission:  4/19/2023 11:44 AM    Assessment & Plan:   Jose Francisco Gonzalez is a 84 year old male admitted on 4/19/2023 for weakness and general deconditioning, admitted for further evaluation and treatment.     Generalized weakness with weight loss   Physical deconditioning  -Multifactorial in the setting of tooth extraction with denture placement and subsequent issues with dental implant infection and proper fitting dentures leading to increased gag reflex and general poor PO intake with mechanical soft diet.Also reports intermittent dysphagia with taking pills. Pt also on a fluid restriction of 1.2 L for months for hyponatremia (follows with Nephrology) which is likely contributing to further dehydration and weakness. Recent diagnosis of skin melanoma a few weeks ago. Uncontrolled anxiety and depression as well playing a role.   -Treat separate issues as below   -SLP consulted for dysphagia   -Nutrition consulted   -PT consulted, recommend TCU. Pt and wife agreeable. Per wife, plan to transition to [facility] subsequent to TCU   -CC/SW consulted, appreciate discharge planning    Acute delirium  -Patient apparently was agitated and delirious overnight.  Per nursing report he does tend to get worse during the evening with typical sundowning symptoms.  -Recommend Seroquel 12.5 mg as needed twice a day     Melanoma, recent diagnosis: Had suspicious mole removed at [facility] by Dr. Kasper a few weeks ago with biopsy showing melanoma. Pt has plans for follow-up with Dermatology 5/2 for suture removal. Wife and patient are unclear about any further recommendations. Alk phos WNL. Calcium WNL.   - Given generalized weakness, fatigue, weight loss (although explainable by alternative causes above) recommend CT C/A/P for malignancy work-up. Discussed with patient who seems overwhelmed by recommendation at this time and would like  coordination, kinesthetic sense, posture, motor skill, proprioception. (69400)     Therapeutic Activities/ADL:   [x] Provided use of dynamic activities to improve functional performance (68875)  [] Provided self-care/home management training for activities of daily living and compensatory training (11974)     Manual Treatments:   [] Provided manual therapy to mobilize soft tissue/joints for the purpose of modulating pain, promoting relaxation, increasing ROM, reducing/eliminating soft tissue swelling/inflammation/restriction, improving soft tissue extensibility. (34405)     Orthotic Management:   [] Provided assessment and fitting orthotic device for improved functional performance.  (51114)    Service Based Modalities:  1 mod complexity evaluation    Timed Code Treatment Minutes:   10 there act    Total Treatment Minutes:   42 minutes    Treatment/Activity Tolerance:  [x] Patient tolerated treatment well [] Patient limited by fatique  [] Patient limited by pain  [] Patient limited by other medical complications  [] Other:     Prognosis: [x] Good [] Fair  [] Poor    Patient Requires Follow-up: [x] Yes  [] No      Goals:  Short Term Goals  Time Frame for Short term goals: 2 weeks  Short Term Goal 1: Provide patient education of techniques to reduce edema in R hand  Short Term Goal 2: Provide education for a/e to ease ADL tasks  Long Term Goals  Time Frame for Long term goals : 4 weeks  Long Term Goal 1: Pt will decrease edema in R hand with PIP and DIP joints within 0.3cm of L hand and MCPs/distal palmar crease/wrist crease within 0.5-1.0 cm of L hand  Long Term Goal 2: Pt will improve ROM of RUE to > 65° wrist flexion, > 65 wrist extension, > 35° UD, thumb palmar abduction > 40°, thumb radial abduction > 55°, and be able to make a functional fist for improved ability to complete self care tasks  Long Term Goal 3: Pt will improve FMC of RUE with score on 9HPT < 30 seconds for improved ability to complete ADL to think about it. Rylan 4/22 AM. Attempted to call wife, Divya, and update on recommendation, but went to voicemail.      Symptomatic orthostatic hypotension: Positive vitals 4/21 AM. S/P 500 ml bolus.   -Blood pressure appears stable.  Patient denies lightheadedness or dizziness  -Monitor clinically     Dysphagia   -Wife reports intermittent dysphagia at home, worse with taking medications. No hx of aspiration pneumonia. No hx of esophageal issues including stricture. No recent EGD.   -SLP following, currently on puréed diet with mildly thick liquids.  If still in the hospital, plan is to proceed with video swallow study on 4/24.     Chronic hyponatremia: Baseline mid to high 120s. Follows with Nephrology. Has been on salt tabs and a 1.2 L fluid restriction for ~5 months.   * Clinically dehydrated on admission, thus hydrated with fluids as above  -Sodium stable at 127      Anxiety, uncontrolled  Depression  -Uncontrolled. Passive suicidal ideation. Progressive vision loss accompanied by appetite issues related to dentures are major stressors.   -Psychiatry consulted on admission. Remeron had recently been increased to 22.5 mg at bedtime 1 week prior to admission, plan to continue this dose. Atarax increased to 25 mg TID PRN  -Unfortunately hyponatremia has limited medication options per wife's report   -As discussed above with concerns for delirium we will try low-dose Seroquel as needed     Hyperglycemia    T2DM, insulin dependent, controlled: A1C 6.9 on 4/12/23. Maintained on Basaglar 5 U qam, Humalog 3 U TID AC and Farxiga 5 mg po every day. Issues with taking due to progressive vision loss and poor oral intake increasing risk for hypoglycemia. Recent clinic visit 4/13/23 reviewed, discussion initiated regarding possible insulin patch, Cequr, but has not been implemented into patient's treatment plan.   -Continue Lantus 5 units daily  -Medium sliding scale insulin   -Hold Farxiga and scheduled mealtime  insulin. Can always add carb ratio 4/22 if appetite improves   -Monitor for hypoglycemia     Leukocytosis: 11.2 on 4/21. Reports chronic cough. Afebrile. UA unremarkable.    Monitor clinically, no indication for antibiotics at this time      Tachycardia: Noted throughout day 4/21. Pt asymptomatic. S/P 500 ml bolus with mild improvement. Did receive Toprol XL in the AM. No reported SOB. Significant anxiety which might be contributing.    -Continue to monitor clinically      Chronic normocytic anemia: Baseline 11-12 over the past year. No active signs of bleeding.   -Monitor     HTN  HLD  -Continue Toprol XL 50 mg po every day with hold parameters in place. Continue statin.      Macular degeneration: Progressively losing vision which is making him more depressed.        Diet: Room Service  Combination Diet Pureed Diet (level 4); Mildly Thick (level 2)     DVT Prophylaxis: Pneumatic Compression Devices   Escobedo Catheter: Not present  Code Status: Full Code     Disposition Plan       Expected Discharge Date: 04/25/2023      Destination: inpatient rehabilitation facility  Discharge Comments: Plan for video swallow eval on Monday; Anxious. IM Zyprexa overnight. Will need TCU placement, SW and CC to follow.      Entered: Osmin Basilio MD 04/22/2023, 10:07 AM        Clinically Significant Risk Factors Present on Admission         # Hyponatremia: Lowest Na = 125 mmol/L in last 2 days, will monitor as appropriate              # DMII: A1C = 6.9 % (Ref range: 0.0 - 5.6 %) within past 6 months              The patient's care was discussed with the Bedside Nurse and Patient.    Medical Decision Making       **CLEAR ALL SELECTIONS**        Labs/Imaging Reviewed:  See Information above and Data section below    Time SPENT BY ME on the date of service doing chart review, history, exam, documentation & further activities per the note:  35 MINUTES    Osmin Basilio MD  Hospitalist Service  [facility]  tasks  Long Term Goal 4: Pt will improve score on DASH to < 30% level of impariment to indicate overall improvement in functional use of RUE with self care tasks    Plan:   [] Continue per plan of care [] Alter current plan (see comments)  [x] Plan of care initiated [] Hold pending MD visit [] Discharge    Plan for Next Session:      Electronically signed by:  JOHN PAUL Hendricks, OTR/L "Hospital  Text Page 7AM-6PM  Securely message with the Vocera Web Console (learn more here)  Text page via [employer] Paging/Directory    ______________________________________________________________________    Interval History   Patient had an episode of agitation overnight and apparently was disoriented.  Did not require pharmacological intervention though.  Appears much calmer this morning.  Frustrated about his current diet.  Sodium is slightly better.  Afebrile    Data reviewed today: I reviewed all medications, new labs and imaging results over the last 24 hours. I personally reviewed no images or EKG's today.    Physical Exam   Vital signs:  Temp: 97.8  F (36.6  C) Temp src: Oral BP: 124/77 Pulse: 106   Resp: 18 SpO2: 94 % O2 Device: None (Room air)   Height: 180 cm (5' 10.87\") Weight: 63.5 kg (140 lb)  Estimated body mass index is 19.6 kg/m  as calculated from the following:    Height as of this encounter: 1.8 m (5' 10.87\").    Weight as of this encounter: 63.5 kg (140 lb).      Wt Readings from Last 2 Encounters:   04/19/23 63.5 kg (140 lb)   03/28/23 64.6 kg (142 lb 8 oz)       Gen: AAOX3, NAD, somewhat forgetful, comfortable, calm  Resp: CTA B/L, normal WOB  CVS: RRR, no murmur  Abd/GI: Soft, non-tender. BS- normoactive.    Skin: Warm, dry no rashes  MSK:  no pedal edema  Neuro- CN- intact. No focal deficits.      Data   Recent Labs   Lab 04/22/23  0808 04/22/23  0753 04/22/23  0153 04/21/23  1218 04/21/23  1052 04/20/23  0804 04/20/23  0722 04/19/23  2317 04/19/23  1219   WBC  --  12.1*  --   --  11.2*  --  10.5  --  6.0   HGB  --  12.2*  --   --  12.5*  --  11.8*  --  12.3*   MCV  --  88  --   --  90  --  90  --  89   PLT  --  287  --   --  297  --  240  --  275   NA  --  127*  --   --  125*  --  130*  --  129*   POTASSIUM  --  3.9  --   --  3.9  --  4.1  --  4.4   CHLORIDE  --  92*  --   --  91*  --  96*  --  94*   CO2  --  27  --   --  23  --  24  --  24   BUN  --  13.0  --   --  11.4  --  13.6  --  23.1* "   CR  --  0.55*  --   --  0.59*  --  0.56*  --  0.67   ANIONGAP  --  8  --   --  11  --  10  --  11   DENISE  --  9.1  --   --  8.8  --  8.9  --  9.0   * 200* 190*   < > 303*   < > 137*   < > 142*   ALBUMIN  --   --   --   --   --   --   --   --  3.8   PROTTOTAL  --   --   --   --   --   --   --   --  7.1   BILITOTAL  --   --   --   --   --   --   --   --  0.6   ALKPHOS  --   --   --   --   --   --   --   --  73   ALT  --   --   --   --   --   --   --   --  11   AST  --   --   --   --   --   --   --   --  25    < > = values in this interval not displayed.       No results found for this or any previous visit (from the past 24 hour(s)).  Medications       atorvastatin  20 mg Oral Daily     insulin aspart  1-3 Units Subcutaneous TID AC     insulin aspart  1-3 Units Subcutaneous At Bedtime     insulin glargine  5 Units Subcutaneous QAM AC     metoprolol succinate ER  50 mg Oral Daily     mirtazapine  22.5 mg Oral At Bedtime     sodium chloride (PF)  3 mL Intracatheter Q8H

## 2023-04-24 RX ORDER — GABAPENTIN 100 MG/1
100 CAPSULE ORAL 2 TIMES DAILY
Qty: 60 CAPSULE | Refills: 0 | Status: SHIPPED | OUTPATIENT
Start: 2023-04-24 | End: 2023-05-24

## 2023-05-02 ENCOUNTER — PATIENT MESSAGE (OUTPATIENT)
Dept: FAMILY MEDICINE CLINIC | Age: 58
End: 2023-05-02

## 2023-05-02 DIAGNOSIS — F41.9 ANXIETY: ICD-10-CM

## 2023-05-02 RX ORDER — ALPRAZOLAM 1 MG/1
TABLET ORAL
Qty: 60 TABLET | Refills: 0 | Status: SHIPPED | OUTPATIENT
Start: 2023-05-02 | End: 2023-05-29

## 2023-05-02 RX ORDER — TRIAMCINOLONE ACETONIDE 1 MG/G
CREAM TOPICAL
Qty: 80 G | Refills: 2 | Status: SHIPPED | OUTPATIENT
Start: 2023-05-02

## 2023-05-02 NOTE — TELEPHONE ENCOUNTER
Yas De La Torre called requesting a refill of the below medication which has been pended for you:     Requested Prescriptions     Pending Prescriptions Disp Refills    triamcinolone (KENALOG) 0.1 % cream 80 g 2     Sig: Apply topically 2 times daily. Last Appointment Date: 3/6/2023  Next Appointment Date: Visit date not found    Allergies   Allergen Reactions    Naproxen      Nausea/shaky feeling    Requip [Ropinirole] Other (See Comments)     Hypotension and syncope.

## 2023-05-02 NOTE — TELEPHONE ENCOUNTER
Cecilia Glass called requesting a refill of the below medication which has been pended for you:     Requested Prescriptions     Pending Prescriptions Disp Refills    ALPRAZolam (XANAX) 1 MG tablet 60 tablet 0     Sig: take 1 tablet by mouth twice a day       Last Appointment Date: 3/6/2023  Next Appointment Date: Visit date not found    Allergies   Allergen Reactions    Naproxen      Nausea/shaky feeling    Requip [Ropinirole] Other (See Comments)     Hypotension and syncope.

## 2023-05-19 DIAGNOSIS — M54.50 CHRONIC BILATERAL LOW BACK PAIN WITHOUT SCIATICA: ICD-10-CM

## 2023-05-19 DIAGNOSIS — G89.29 CHRONIC BILATERAL LOW BACK PAIN WITHOUT SCIATICA: ICD-10-CM

## 2023-05-19 RX ORDER — HYDROCODONE BITARTRATE AND ACETAMINOPHEN 5; 325 MG/1; MG/1
1 TABLET ORAL 2 TIMES DAILY PRN
Qty: 60 TABLET | Refills: 0 | OUTPATIENT
Start: 2023-05-19 | End: 2023-06-18

## 2023-05-22 RX ORDER — HYDROCODONE BITARTRATE AND ACETAMINOPHEN 5; 325 MG/1; MG/1
1 TABLET ORAL 2 TIMES DAILY PRN
Qty: 60 TABLET | Refills: 0 | Status: SHIPPED | OUTPATIENT
Start: 2023-05-22 | End: 2023-06-21

## 2023-06-02 DIAGNOSIS — G62.9 NEUROPATHY: ICD-10-CM

## 2023-06-05 ENCOUNTER — PATIENT MESSAGE (OUTPATIENT)
Dept: FAMILY MEDICINE CLINIC | Age: 58
End: 2023-06-05

## 2023-06-05 DIAGNOSIS — F41.9 ANXIETY: ICD-10-CM

## 2023-06-05 DIAGNOSIS — G62.9 NEUROPATHY: ICD-10-CM

## 2023-06-05 RX ORDER — GABAPENTIN 100 MG/1
100 CAPSULE ORAL 2 TIMES DAILY
Qty: 60 CAPSULE | Refills: 0 | OUTPATIENT
Start: 2023-06-05 | End: 2023-07-05

## 2023-06-05 RX ORDER — ALPRAZOLAM 1 MG/1
TABLET ORAL
Qty: 60 TABLET | Refills: 0 | Status: SHIPPED | OUTPATIENT
Start: 2023-06-05 | End: 2023-07-02

## 2023-06-05 RX ORDER — GABAPENTIN 100 MG/1
100 CAPSULE ORAL 2 TIMES DAILY
Qty: 60 CAPSULE | Refills: 0 | Status: SHIPPED | OUTPATIENT
Start: 2023-06-05 | End: 2023-07-05

## 2023-06-05 NOTE — TELEPHONE ENCOUNTER
Javi Read called requesting a refill of the below medication which has been pended for you:     Requested Prescriptions     Pending Prescriptions Disp Refills    gabapentin (NEURONTIN) 100 MG capsule 60 capsule 0     Sig: Take 1 capsule by mouth 2 times daily for 30 days. Intended supply: 90 days       Last Appointment Date: 3/6/2023  Next Appointment Date: 6/5/2023    Allergies   Allergen Reactions    Naproxen      Nausea/shaky feeling    Requip [Ropinirole] Other (See Comments)     Hypotension and syncope.

## 2023-06-05 NOTE — TELEPHONE ENCOUNTER
Alison Lundberg called requesting a refill of the below medication which has been pended for you:     Requested Prescriptions     Pending Prescriptions Disp Refills    ALPRAZolam (XANAX) 1 MG tablet 60 tablet 0     Sig: take 1 tablet by mouth twice a day       Last Appointment Date: 3/6/2023  Next Appointment Date: 6/12/2023    Allergies   Allergen Reactions    Naproxen      Nausea/shaky feeling    Requip [Ropinirole] Other (See Comments)     Hypotension and syncope.

## 2023-06-18 DIAGNOSIS — G89.29 CHRONIC BILATERAL LOW BACK PAIN WITHOUT SCIATICA: ICD-10-CM

## 2023-06-18 DIAGNOSIS — M54.50 CHRONIC BILATERAL LOW BACK PAIN WITHOUT SCIATICA: ICD-10-CM

## 2023-06-19 RX ORDER — HYDROCODONE BITARTRATE AND ACETAMINOPHEN 5; 325 MG/1; MG/1
1 TABLET ORAL 2 TIMES DAILY PRN
Qty: 60 TABLET | Refills: 0 | Status: SHIPPED | OUTPATIENT
Start: 2023-06-22 | End: 2023-07-22

## 2023-06-22 DIAGNOSIS — G89.29 CHRONIC BILATERAL LOW BACK PAIN WITHOUT SCIATICA: ICD-10-CM

## 2023-06-22 DIAGNOSIS — M54.50 CHRONIC BILATERAL LOW BACK PAIN WITHOUT SCIATICA: ICD-10-CM

## 2023-06-22 RX ORDER — HYDROCODONE BITARTRATE AND ACETAMINOPHEN 5; 325 MG/1; MG/1
1 TABLET ORAL 2 TIMES DAILY PRN
Qty: 60 TABLET | Refills: 0 | OUTPATIENT
Start: 2023-06-22 | End: 2023-07-22

## 2023-07-03 ENCOUNTER — PATIENT MESSAGE (OUTPATIENT)
Dept: FAMILY MEDICINE CLINIC | Age: 58
End: 2023-07-03

## 2023-07-03 DIAGNOSIS — F41.9 ANXIETY: ICD-10-CM

## 2023-07-03 DIAGNOSIS — G62.9 NEUROPATHY: ICD-10-CM

## 2023-07-05 RX ORDER — GABAPENTIN 100 MG/1
100 CAPSULE ORAL 2 TIMES DAILY
Qty: 60 CAPSULE | Refills: 0 | Status: SHIPPED | OUTPATIENT
Start: 2023-07-05 | End: 2023-08-04

## 2023-07-05 RX ORDER — ALPRAZOLAM 1 MG/1
TABLET ORAL
Qty: 60 TABLET | Refills: 0 | Status: SHIPPED | OUTPATIENT
Start: 2023-07-05 | End: 2023-08-01

## 2023-07-05 NOTE — TELEPHONE ENCOUNTER
From: Ilene Dewitt  To: Dr. Sauceda Bach: 7/3/2023 6:38 PM EDT  Subject: Refill    Can I got my xanix refilled

## 2023-07-18 DIAGNOSIS — G89.29 CHRONIC BILATERAL LOW BACK PAIN WITHOUT SCIATICA: ICD-10-CM

## 2023-07-18 DIAGNOSIS — M54.50 CHRONIC BILATERAL LOW BACK PAIN WITHOUT SCIATICA: ICD-10-CM

## 2023-07-18 RX ORDER — HYDROCODONE BITARTRATE AND ACETAMINOPHEN 5; 325 MG/1; MG/1
1 TABLET ORAL 2 TIMES DAILY PRN
Qty: 60 TABLET | Refills: 0 | OUTPATIENT
Start: 2023-07-18 | End: 2023-08-17

## 2023-07-18 NOTE — TELEPHONE ENCOUNTER
Duplicate request.  Looks to be too soon, should be closer to the 22nd. Keeping first request in the que to fill when appropriate. dont call early!

## 2023-07-18 NOTE — TELEPHONE ENCOUNTER
Per OARRS, last fill 06/22/23, quantity 60 for 30 days. Orlando Quijano called requesting a refill of the below medication which has been pended for you:     Requested Prescriptions     Pending Prescriptions Disp Refills    HYDROcodone-acetaminophen (NORCO) 5-325 MG per tablet 60 tablet 0     Sig: Take 1 tablet by mouth 2 times daily as needed for Pain for up to 30 days. Last Appointment Date: 6/12/2023  Next Appointment Date: 12/12/2023    Allergies   Allergen Reactions    Naproxen      Nausea/shaky feeling    Requip [Ropinirole] Other (See Comments)     Hypotension and syncope.

## 2023-07-19 DIAGNOSIS — M54.50 CHRONIC BILATERAL LOW BACK PAIN WITHOUT SCIATICA: ICD-10-CM

## 2023-07-19 DIAGNOSIS — G89.29 CHRONIC BILATERAL LOW BACK PAIN WITHOUT SCIATICA: ICD-10-CM

## 2023-07-20 RX ORDER — HYDROCODONE BITARTRATE AND ACETAMINOPHEN 5; 325 MG/1; MG/1
1 TABLET ORAL 2 TIMES DAILY PRN
Qty: 60 TABLET | Refills: 0 | OUTPATIENT
Start: 2023-07-20 | End: 2023-08-19

## 2023-07-21 RX ORDER — HYDROCODONE BITARTRATE AND ACETAMINOPHEN 5; 325 MG/1; MG/1
1 TABLET ORAL 2 TIMES DAILY PRN
Qty: 60 TABLET | Refills: 0 | Status: SHIPPED | OUTPATIENT
Start: 2023-07-21 | End: 2023-08-20

## 2023-08-08 ENCOUNTER — PATIENT MESSAGE (OUTPATIENT)
Dept: FAMILY MEDICINE CLINIC | Age: 58
End: 2023-08-08

## 2023-08-08 DIAGNOSIS — F41.9 ANXIETY: ICD-10-CM

## 2023-08-09 RX ORDER — ALPRAZOLAM 1 MG/1
TABLET ORAL
Qty: 60 TABLET | Refills: 0 | Status: SHIPPED | OUTPATIENT
Start: 2023-08-09 | End: 2023-09-05

## 2023-08-20 DIAGNOSIS — G62.9 NEUROPATHY: ICD-10-CM

## 2023-08-21 RX ORDER — GABAPENTIN 100 MG/1
100 CAPSULE ORAL 2 TIMES DAILY
Qty: 180 CAPSULE | Refills: 0 | Status: SHIPPED | OUTPATIENT
Start: 2023-08-21 | End: 2023-11-19

## 2023-08-21 NOTE — TELEPHONE ENCOUNTER
Per OARRS last refill 07/07 #60  \  Roland Roberts called requesting a refill of the below medication which has been pended for you:     Requested Prescriptions     Pending Prescriptions Disp Refills    gabapentin (NEURONTIN) 100 MG capsule 60 capsule 0     Sig: Take 1 capsule by mouth 2 times daily for 30 days. Intended supply: 90 days       Last Appointment Date: 6/12/2023  Next Appointment Date: 9/12/2023    Allergies   Allergen Reactions    Naproxen      Nausea/shaky feeling    Requip [Ropinirole] Other (See Comments)     Hypotension and syncope.

## 2023-08-22 DIAGNOSIS — M54.50 CHRONIC BILATERAL LOW BACK PAIN WITHOUT SCIATICA: ICD-10-CM

## 2023-08-22 DIAGNOSIS — G89.29 CHRONIC BILATERAL LOW BACK PAIN WITHOUT SCIATICA: ICD-10-CM

## 2023-08-23 RX ORDER — HYDROCODONE BITARTRATE AND ACETAMINOPHEN 5; 325 MG/1; MG/1
1 TABLET ORAL 2 TIMES DAILY PRN
Qty: 60 TABLET | Refills: 0 | Status: SHIPPED | OUTPATIENT
Start: 2023-08-23 | End: 2023-09-22

## 2023-09-05 DIAGNOSIS — F41.9 ANXIETY: ICD-10-CM

## 2023-09-08 RX ORDER — TRIAMCINOLONE ACETONIDE 1 MG/G
CREAM TOPICAL
Qty: 80 G | Refills: 2 | Status: SHIPPED | OUTPATIENT
Start: 2023-09-08

## 2023-09-08 RX ORDER — ALPRAZOLAM 1 MG/1
TABLET ORAL
Qty: 60 TABLET | Refills: 0 | Status: SHIPPED | OUTPATIENT
Start: 2023-09-08 | End: 2023-10-02

## 2023-09-26 ENCOUNTER — PATIENT MESSAGE (OUTPATIENT)
Dept: FAMILY MEDICINE CLINIC | Age: 58
End: 2023-09-26

## 2023-09-26 DIAGNOSIS — G89.29 CHRONIC BILATERAL LOW BACK PAIN WITHOUT SCIATICA: ICD-10-CM

## 2023-09-26 DIAGNOSIS — M54.50 CHRONIC BILATERAL LOW BACK PAIN WITHOUT SCIATICA: ICD-10-CM

## 2023-09-26 RX ORDER — HYDROCODONE BITARTRATE AND ACETAMINOPHEN 5; 325 MG/1; MG/1
1 TABLET ORAL 2 TIMES DAILY PRN
Qty: 60 TABLET | Refills: 0 | Status: SHIPPED | OUTPATIENT
Start: 2023-09-26 | End: 2023-10-26

## 2023-09-26 NOTE — TELEPHONE ENCOUNTER
From: Don Everett  To: Dr. Perez Backer: 9/26/2023 10:15 AM EDT  Subject: Simona Romero    Can I get my norco refilled

## 2023-10-04 ENCOUNTER — PATIENT MESSAGE (OUTPATIENT)
Dept: FAMILY MEDICINE CLINIC | Age: 58
End: 2023-10-04

## 2023-10-04 DIAGNOSIS — F41.9 ANXIETY: ICD-10-CM

## 2023-10-09 RX ORDER — ALPRAZOLAM 1 MG/1
TABLET ORAL
Qty: 60 TABLET | Refills: 0 | Status: SHIPPED | OUTPATIENT
Start: 2023-10-09 | End: 2023-10-28

## 2023-10-26 DIAGNOSIS — G89.29 CHRONIC BILATERAL LOW BACK PAIN WITHOUT SCIATICA: ICD-10-CM

## 2023-10-26 DIAGNOSIS — M54.50 CHRONIC BILATERAL LOW BACK PAIN WITHOUT SCIATICA: ICD-10-CM

## 2023-10-26 RX ORDER — HYDROCODONE BITARTRATE AND ACETAMINOPHEN 5; 325 MG/1; MG/1
1 TABLET ORAL 2 TIMES DAILY PRN
Qty: 60 TABLET | Refills: 0 | Status: CANCELLED | OUTPATIENT
Start: 2023-10-26 | End: 2023-11-25

## 2023-10-26 NOTE — TELEPHONE ENCOUNTER
GO out of office, BE notes  Med verified in [id number] [facility] 15 last filled 9/26/23 for #60/30 days    Last OV: 6/12/23  Next OV: 12/12/23

## 2023-10-26 NOTE — TELEPHONE ENCOUNTER
OARRs reviewed. Pt received 28 tabs of percocet 5/325mg on 10/20/23.  At this time she can wait until GO comes back to discuss a refill with him for her norco.

## 2023-10-26 NOTE — TELEPHONE ENCOUNTER
Med verified in [id number] [facility] 15 last filled 9/26/23 for #60/30 days     Last OV: 6/12/23  Next OV: 12/12/23

## 2023-10-31 RX ORDER — HYDROCODONE BITARTRATE AND ACETAMINOPHEN 5; 325 MG/1; MG/1
1 TABLET ORAL 2 TIMES DAILY PRN
Qty: 60 TABLET | Refills: 0 | Status: SHIPPED | OUTPATIENT
Start: 2023-10-31 | End: 2023-11-30

## 2023-11-09 NOTE — TELEPHONE ENCOUNTER
From: Tico Dacosta  To: Dr. Tali Newman: 1/18/2022 11:44 AM EST  Subject: Medications    Trying to get my norco refilled and don't see it in mychart
OARRS reviewed  Last fill 12/1/21 #60/30days
Number Of Freeze-Thaw Cycles: 1 freeze-thaw cycle
Render Post-Care Instructions In Note?: no
Post-Care Instructions: I reviewed with the patient in detail post-care instructions. Patient is to wear sunprotection, and avoid picking at any of the treated lesions. Pt may apply Vaseline to crusted or scabbing areas.
Consent: The patient's consent was obtained including but not limited to risks of crusting, scabbing, blistering, scarring, darker or lighter pigmentary change, recurrence, incomplete removal and infection.
Detail Level: Detailed
Show Applicator Variable?: Yes
Duration Of Freeze Thaw-Cycle (Seconds): 1
Application Tool (Optional): Liquid Nitrogen Sprayer

## 2023-11-10 ENCOUNTER — PATIENT MESSAGE (OUTPATIENT)
Dept: FAMILY MEDICINE CLINIC | Age: 58
End: 2023-11-10

## 2023-11-10 DIAGNOSIS — F41.9 ANXIETY: ICD-10-CM

## 2023-11-10 RX ORDER — ALPRAZOLAM 1 MG/1
TABLET ORAL
Qty: 60 TABLET | Refills: 0 | Status: SHIPPED | OUTPATIENT
Start: 2023-11-10 | End: 2023-11-29

## 2023-11-10 NOTE — TELEPHONE ENCOUNTER
Med verified in [id number] [facility] 15 last filled 10/9/23 for #60/30 days    Last OV: 6/12/23  Next OV: 12/12/23

## 2023-11-10 NOTE — TELEPHONE ENCOUNTER
From: Nicole Lyons  To: Dr. Bertis Soulier: 11/10/2023 8:28 AM EST  Subject: Refill    Xanix refilled

## 2023-11-14 RX ORDER — TRIAMCINOLONE ACETONIDE 1 MG/G
CREAM TOPICAL
Qty: 80 G | Refills: 2 | Status: SHIPPED | OUTPATIENT
Start: 2023-11-14

## 2023-11-14 RX ORDER — FLUOCINONIDE TOPICAL SOLUTION USP, 0.05% 0.5 MG/ML
SOLUTION TOPICAL
Qty: 60 ML | Refills: 5 | Status: SHIPPED | OUTPATIENT
Start: 2023-11-14

## 2023-11-14 NOTE — TELEPHONE ENCOUNTER
Orlando Quijano called requesting a refill of the below medication which has been pended for you:     Requested Prescriptions     Pending Prescriptions Disp Refills    fluocinonide (LIDEX) 0.05 % external solution 60 mL 5     Sig: Apply topically 2 times daily. triamcinolone (KENALOG) 0.1 % cream 80 g 2     Sig: Apply topically 2 times daily. Last Appointment Date: 6/12/2023  Next Appointment Date: 12/12/2023    Allergies   Allergen Reactions    Naproxen      Nausea/shaky feeling    Requip [Ropinirole] Other (See Comments)     Hypotension and syncope.

## 2023-11-27 DIAGNOSIS — M54.50 CHRONIC BILATERAL LOW BACK PAIN WITHOUT SCIATICA: ICD-10-CM

## 2023-11-27 DIAGNOSIS — G89.29 CHRONIC BILATERAL LOW BACK PAIN WITHOUT SCIATICA: ICD-10-CM

## 2023-11-27 RX ORDER — HYDROCODONE BITARTRATE AND ACETAMINOPHEN 5; 325 MG/1; MG/1
1 TABLET ORAL 2 TIMES DAILY PRN
Qty: 60 TABLET | Refills: 0 | OUTPATIENT
Start: 2023-11-27 | End: 2023-12-27

## 2023-11-27 NOTE — TELEPHONE ENCOUNTER
Attempted RN pre admit tele red chart appt.  Voicemail left asking patient to call back today to complete pre admit appt.  NPO and bathing instructions left on voicemail.   From: Jessica Nava  To: Dr. Juan Brooks: 10/20/2022 12:22 PM EDT  Subject: Cloriatlat Jimenez    Can i get my refill not on my chart

## 2023-11-29 ENCOUNTER — OFFICE VISIT (OUTPATIENT)
Dept: FAMILY MEDICINE CLINIC | Age: 58
End: 2023-11-29
Payer: COMMERCIAL

## 2023-11-29 ENCOUNTER — HOSPITAL ENCOUNTER (OUTPATIENT)
Age: 58
Discharge: HOME | End: 2023-11-29
Payer: COMMERCIAL

## 2023-11-29 VITALS
HEART RATE: 82 BPM | BODY MASS INDEX: 31.83 KG/M2 | OXYGEN SATURATION: 98 % | HEIGHT: 61 IN | DIASTOLIC BLOOD PRESSURE: 70 MMHG | SYSTOLIC BLOOD PRESSURE: 120 MMHG | WEIGHT: 168.6 LBS

## 2023-11-29 DIAGNOSIS — K21.00 GASTROESOPHAGEAL REFLUX DISEASE WITH ESOPHAGITIS WITHOUT HEMORRHAGE: ICD-10-CM

## 2023-11-29 DIAGNOSIS — E78.00 PURE HYPERCHOLESTEROLEMIA: ICD-10-CM

## 2023-11-29 DIAGNOSIS — R73.01 IMPAIRED FASTING BLOOD SUGAR: ICD-10-CM

## 2023-11-29 DIAGNOSIS — F41.9 ANXIETY: Primary | ICD-10-CM

## 2023-11-29 DIAGNOSIS — L40.9 PSORIASIS: ICD-10-CM

## 2023-11-29 DIAGNOSIS — M54.50 CHRONIC BILATERAL LOW BACK PAIN WITHOUT SCIATICA: ICD-10-CM

## 2023-11-29 DIAGNOSIS — M18.0 ARTHRITIS OF CARPOMETACARPAL (CMC) JOINT OF BOTH THUMBS: ICD-10-CM

## 2023-11-29 DIAGNOSIS — G47.00 INSOMNIA, UNSPECIFIED TYPE: ICD-10-CM

## 2023-11-29 DIAGNOSIS — Z13.1 SCREENING FOR DIABETES MELLITUS: ICD-10-CM

## 2023-11-29 DIAGNOSIS — G89.29 CHRONIC BILATERAL LOW BACK PAIN WITHOUT SCIATICA: ICD-10-CM

## 2023-11-29 LAB
ALBUMIN SERPL-MCNC: 4.2 G/DL (ref 3.5–5.2)
ALBUMIN/GLOB SERPL: 1.6 {RATIO} (ref 1–2.5)
ALP SERPL-CCNC: 88 U/L (ref 35–104)
ALT SERPL-CCNC: 28 U/L (ref 5–33)
ANION GAP SERPL CALCULATED.3IONS-SCNC: 7 MMOL/L (ref 9–17)
AST SERPL-CCNC: 24 U/L
BASOPHILS # BLD: 0.03 K/UL (ref 0–0.2)
BASOPHILS NFR BLD: 1 % (ref 0–2)
BILIRUB SERPL-MCNC: 0.4 MG/DL (ref 0.3–1.2)
BUN SERPL-MCNC: 17 MG/DL (ref 6–20)
BUN/CREAT SERPL: 21 (ref 9–20)
CALCIUM SERPL-MCNC: 9.3 MG/DL (ref 8.6–10.4)
CHLORIDE SERPL-SCNC: 108 MMOL/L (ref 98–107)
CO2 SERPL-SCNC: 29 MMOL/L (ref 20–31)
CREAT SERPL-MCNC: 0.8 MG/DL (ref 0.5–0.9)
EOSINOPHIL # BLD: 0.18 K/UL (ref 0–0.44)
EOSINOPHILS RELATIVE PERCENT: 3 % (ref 1–4)
ERYTHROCYTE [DISTWIDTH] IN BLOOD BY AUTOMATED COUNT: 12.2 % (ref 11.8–14.4)
GFR SERPL CREATININE-BSD FRML MDRD: >60 ML/MIN/1.73M2
GLUCOSE SERPL-MCNC: 110 MG/DL (ref 70–99)
HCT VFR BLD AUTO: 41.8 % (ref 36.3–47.1)
HGB BLD-MCNC: 13.5 G/DL (ref 11.9–15.1)
IMM GRANULOCYTES # BLD AUTO: <0.03 K/UL (ref 0–0.3)
IMM GRANULOCYTES NFR BLD: 0 %
LYMPHOCYTES NFR BLD: 1.39 K/UL (ref 1.1–3.7)
LYMPHOCYTES RELATIVE PERCENT: 25 % (ref 24–43)
MCH RBC QN AUTO: 31.1 PG (ref 25.2–33.5)
MCHC RBC AUTO-ENTMCNC: 32.3 G/DL (ref 25.2–33.5)
MCV RBC AUTO: 96.3 FL (ref 82.6–102.9)
MONOCYTES NFR BLD: 0.48 K/UL (ref 0.1–1.2)
MONOCYTES NFR BLD: 9 % (ref 3–12)
NEUTROPHILS NFR BLD: 62 % (ref 36–65)
NEUTS SEG NFR BLD: 3.38 K/UL (ref 1.5–8.1)
NRBC BLD-RTO: 0 PER 100 WBC
PLATELET # BLD AUTO: 300 K/UL (ref 138–453)
PMV BLD AUTO: 9 FL (ref 8.1–13.5)
POTASSIUM SERPL-SCNC: 4.7 MMOL/L (ref 3.7–5.3)
PROT SERPL-MCNC: 6.9 G/DL (ref 6.4–8.3)
RBC # BLD AUTO: 4.34 M/UL (ref 3.95–5.11)
SODIUM SERPL-SCNC: 144 MMOL/L (ref 135–144)
WBC OTHER # BLD: 5.5 K/UL (ref 3.5–11.3)

## 2023-11-29 PROCEDURE — 80053 COMPREHEN METABOLIC PANEL: CPT

## 2023-11-29 PROCEDURE — 83036 HEMOGLOBIN GLYCOSYLATED A1C: CPT

## 2023-11-29 PROCEDURE — 36415 COLL VENOUS BLD VENIPUNCTURE: CPT

## 2023-11-29 PROCEDURE — 80061 LIPID PANEL: CPT

## 2023-11-29 PROCEDURE — 85025 COMPLETE CBC W/AUTO DIFF WBC: CPT

## 2023-11-29 PROCEDURE — 99214 OFFICE O/P EST MOD 30 MIN: CPT | Performed by: FAMILY MEDICINE

## 2023-11-29 RX ORDER — HYDROCODONE BITARTRATE AND ACETAMINOPHEN 5; 325 MG/1; MG/1
1 TABLET ORAL 2 TIMES DAILY PRN
Qty: 60 TABLET | Refills: 0 | Status: SHIPPED | OUTPATIENT
Start: 2023-11-29 | End: 2023-12-29

## 2023-11-29 RX ORDER — IBUPROFEN 800 MG/1
800 TABLET ORAL EVERY 8 HOURS PRN
COMMUNITY
Start: 2023-11-16

## 2023-11-29 ASSESSMENT — ENCOUNTER SYMPTOMS
EYES NEGATIVE: 1
RESPIRATORY NEGATIVE: 1
BACK PAIN: 1
GASTROINTESTINAL NEGATIVE: 1

## 2023-11-30 LAB
CHOLEST SERPL-MCNC: 213 MG/DL
CHOLESTEROL/HDL RATIO: 3.2
EST. AVERAGE GLUCOSE BLD GHB EST-MCNC: 100 MG/DL
HBA1C MFR BLD: 5.1 % (ref 4–6)
HDLC SERPL-MCNC: 67 MG/DL
LDLC SERPL CALC-MCNC: 115 MG/DL (ref 0–130)
TRIGL SERPL-MCNC: 153 MG/DL

## 2023-12-08 ENCOUNTER — PATIENT MESSAGE (OUTPATIENT)
Dept: FAMILY MEDICINE CLINIC | Age: 58
End: 2023-12-08

## 2023-12-08 DIAGNOSIS — F41.9 ANXIETY: ICD-10-CM

## 2023-12-08 RX ORDER — SIMVASTATIN 20 MG
20 TABLET ORAL NIGHTLY
Qty: 90 TABLET | Refills: 1 | Status: SHIPPED | OUTPATIENT
Start: 2023-12-08

## 2023-12-08 NOTE — TELEPHONE ENCOUNTER
Anuel Lui called requesting a refill of the below medication which has been pended for you:     Requested Prescriptions     Pending Prescriptions Disp Refills    simvastatin (ZOCOR) 20 MG tablet [Pharmacy Med Name: SIMVASTATIN 20 MG TABLET] 90 tablet 1     Sig: take 1 tablet by mouth at bedtime       Last Appointment Date: 11/29/2023  Next Appointment Date: Visit date not found    Allergies   Allergen Reactions    Naproxen      Nausea/shaky feeling    Requip [Ropinirole] Other (See Comments)     Hypotension and syncope.

## 2023-12-11 RX ORDER — ALPRAZOLAM 1 MG/1
TABLET ORAL
Qty: 60 TABLET | Refills: 0 | Status: SHIPPED | OUTPATIENT
Start: 2023-12-11 | End: 2023-12-30

## 2023-12-11 NOTE — TELEPHONE ENCOUNTER
Meds verified in Epic last filled 11/12/23 for #60/30 days    Last OV: 11/29/23  Next OV: Visit date not found

## 2023-12-11 NOTE — TELEPHONE ENCOUNTER
From: Milli Lora  To: Dr. Earlene Ramirez  Sent: 12/8/2023 6:50 PM EST  Subject: Refill     Xanix refilled and hope u have a alexandra akash

## 2023-12-12 DIAGNOSIS — G62.9 NEUROPATHY: ICD-10-CM

## 2023-12-12 RX ORDER — SIMVASTATIN 20 MG
20 TABLET ORAL NIGHTLY
Qty: 90 TABLET | Refills: 1 | OUTPATIENT
Start: 2023-12-12

## 2023-12-12 RX ORDER — FLUOCINONIDE TOPICAL SOLUTION USP, 0.05% 0.5 MG/ML
SOLUTION TOPICAL
Qty: 60 ML | Refills: 5 | OUTPATIENT
Start: 2023-12-12

## 2023-12-12 RX ORDER — TRIAMCINOLONE ACETONIDE 1 MG/G
CREAM TOPICAL
Qty: 80 G | Refills: 2 | OUTPATIENT
Start: 2023-12-12

## 2023-12-12 RX ORDER — PANTOPRAZOLE SODIUM 40 MG/1
40 TABLET, DELAYED RELEASE ORAL DAILY
Qty: 90 TABLET | Refills: 0 | Status: SHIPPED | OUTPATIENT
Start: 2023-12-12 | End: 2024-03-11

## 2023-12-12 RX ORDER — TRAZODONE HYDROCHLORIDE 50 MG/1
50 TABLET ORAL NIGHTLY
Qty: 30 TABLET | Refills: 11 | OUTPATIENT
Start: 2023-12-12

## 2023-12-12 RX ORDER — GABAPENTIN 100 MG/1
100 CAPSULE ORAL 2 TIMES DAILY
Qty: 180 CAPSULE | Refills: 0 | Status: SHIPPED | OUTPATIENT
Start: 2023-12-12 | End: 2024-03-11

## 2023-12-12 NOTE — TELEPHONE ENCOUNTER
Meds verified in Epic last filled 8/21/23 for #180/90 days      Terese Wills called requesting a refill of the below medication which has been pended for you:     Requested Prescriptions     Pending Prescriptions Disp Refills    pantoprazole (PROTONIX) 40 MG tablet 90 tablet 0     Sig: Take 1 tablet by mouth daily    gabapentin (NEURONTIN) 100 MG capsule 180 capsule 0     Sig: Take 1 capsule by mouth 2 times daily for 90 days. Intended supply: 90 days     Refused Prescriptions Disp Refills    traZODone (DESYREL) 50 MG tablet 30 tablet 11     Sig: Take 1 tablet by mouth nightly    fluocinonide (LIDEX) 0.05 % external solution 60 mL 5     Sig: Apply topically 2 times daily. triamcinolone (KENALOG) 0.1 % cream 80 g 2     Sig: Apply topically 2 times daily. simvastatin (ZOCOR) 20 MG tablet 90 tablet 1     Sig: Take 1 tablet by mouth nightly at bedtime. Last Appointment Date: 11/29/2023  Next Appointment Date: Visit date not found    Allergies   Allergen Reactions    Naproxen      Nausea/shaky feeling    Requip [Ropinirole] Other (See Comments)     Hypotension and syncope.

## 2023-12-26 DIAGNOSIS — M54.50 CHRONIC BILATERAL LOW BACK PAIN WITHOUT SCIATICA: ICD-10-CM

## 2023-12-26 DIAGNOSIS — G89.29 CHRONIC BILATERAL LOW BACK PAIN WITHOUT SCIATICA: ICD-10-CM

## 2023-12-26 NOTE — TELEPHONE ENCOUNTER
Coby Hendrickson called requesting a refill of the below medication which has been pended for you:     Requested Prescriptions     Pending Prescriptions Disp Refills    HYDROcodone-acetaminophen (NORCO) 5-325 MG per tablet 60 tablet 0     Sig: Take 1 tablet by mouth 2 times daily as needed for Pain for up to 30 days. Last Appointment Date: 11/29/2023  Next Appointment Date: Visit date not found    Allergies   Allergen Reactions    Naproxen      Nausea/shaky feeling    Requip [Ropinirole] Other (See Comments)     Hypotension and syncope.

## 2023-12-27 DIAGNOSIS — G89.29 CHRONIC BILATERAL LOW BACK PAIN WITHOUT SCIATICA: ICD-10-CM

## 2023-12-27 DIAGNOSIS — M54.50 CHRONIC BILATERAL LOW BACK PAIN WITHOUT SCIATICA: ICD-10-CM

## 2023-12-27 RX ORDER — HYDROCODONE BITARTRATE AND ACETAMINOPHEN 5; 325 MG/1; MG/1
1 TABLET ORAL 2 TIMES DAILY PRN
Qty: 60 TABLET | Refills: 0 | OUTPATIENT
Start: 2023-12-27 | End: 2024-01-26

## 2023-12-28 RX ORDER — HYDROCODONE BITARTRATE AND ACETAMINOPHEN 5; 325 MG/1; MG/1
1 TABLET ORAL 2 TIMES DAILY PRN
Qty: 60 TABLET | Refills: 0 | Status: SHIPPED | OUTPATIENT
Start: 2023-12-28 | End: 2024-01-27

## 2024-01-10 ENCOUNTER — PATIENT MESSAGE (OUTPATIENT)
Dept: FAMILY MEDICINE CLINIC | Age: 59
End: 2024-01-10

## 2024-01-10 DIAGNOSIS — F41.9 ANXIETY: ICD-10-CM

## 2024-01-10 RX ORDER — ALPRAZOLAM 1 MG/1
TABLET ORAL
Qty: 60 TABLET | Refills: 0 | Status: SHIPPED | OUTPATIENT
Start: 2024-01-10 | End: 2024-01-29

## 2024-01-10 NOTE — TELEPHONE ENCOUNTER
From: Daija Cha  To: Dr. Diego Weems  Sent: 1/10/2024 10:25 AM EST  Subject: Refill    Refill on xanix please

## 2024-01-10 NOTE — TELEPHONE ENCOUNTER
Meds verified in Epic last filled 12/11/23 for #60/30 days    Last OV: 11/29/23  Next OV: Visit date not found

## 2024-01-11 DIAGNOSIS — F41.9 ANXIETY: ICD-10-CM

## 2024-01-11 RX ORDER — ALPRAZOLAM 1 MG/1
TABLET ORAL
Qty: 60 TABLET | Refills: 0 | OUTPATIENT
Start: 2024-01-11 | End: 2024-01-30

## 2024-01-11 RX ORDER — TRAZODONE HYDROCHLORIDE 50 MG/1
50 TABLET ORAL NIGHTLY
Qty: 30 TABLET | Refills: 11 | OUTPATIENT
Start: 2024-01-11

## 2024-01-26 DIAGNOSIS — G89.29 CHRONIC BILATERAL LOW BACK PAIN WITHOUT SCIATICA: ICD-10-CM

## 2024-01-26 DIAGNOSIS — M54.50 CHRONIC BILATERAL LOW BACK PAIN WITHOUT SCIATICA: ICD-10-CM

## 2024-01-26 NOTE — TELEPHONE ENCOUNTER
Meds verified in Epic last filled 12/30/23 for #60/30 days    Last OV: 11/29/23  Next OV: Visit date not found    Can wait until Dr. Weems returns 1/29/24.

## 2024-01-29 RX ORDER — HYDROCODONE BITARTRATE AND ACETAMINOPHEN 5; 325 MG/1; MG/1
1 TABLET ORAL 2 TIMES DAILY PRN
Qty: 60 TABLET | Refills: 0 | Status: SHIPPED | OUTPATIENT
Start: 2024-01-29 | End: 2024-02-28

## 2024-01-29 RX ORDER — HYDROCODONE BITARTRATE AND ACETAMINOPHEN 5; 325 MG/1; MG/1
1 TABLET ORAL 2 TIMES DAILY PRN
Qty: 60 TABLET | Refills: 0 | OUTPATIENT
Start: 2024-01-29 | End: 2024-02-28

## 2024-01-29 NOTE — TELEPHONE ENCOUNTER
Daija called requesting a refill of the below medication which has been pended for you:     Per OARRS, last fill 12/30, quantity 60 for 30 days.      Requested Prescriptions     Pending Prescriptions Disp Refills    HYDROcodone-acetaminophen (NORCO) 5-325 MG per tablet 60 tablet 0     Sig: Take 1 tablet by mouth 2 times daily as needed for Pain for up to 30 days.       Last Appointment Date: 11/29/2023  Next Appointment Date: Visit date not found    Allergies   Allergen Reactions    Naproxen      Nausea/shaky feeling    Requip [Ropinirole] Other (See Comments)     Hypotension and syncope.

## 2024-02-09 ENCOUNTER — PATIENT MESSAGE (OUTPATIENT)
Dept: FAMILY MEDICINE CLINIC | Age: 59
End: 2024-02-09

## 2024-02-09 DIAGNOSIS — F41.9 ANXIETY: ICD-10-CM

## 2024-02-09 NOTE — TELEPHONE ENCOUNTER
Meds verified in Epic last filled 1/13/24 for #60/30 days    Last OV: 11/29/23  Next OV: Visit date not found

## 2024-02-12 RX ORDER — ALPRAZOLAM 1 MG/1
TABLET ORAL
Qty: 60 TABLET | Refills: 0 | Status: SHIPPED | OUTPATIENT
Start: 2024-02-12 | End: 2024-02-28

## 2024-03-01 ENCOUNTER — PATIENT MESSAGE (OUTPATIENT)
Dept: FAMILY MEDICINE CLINIC | Age: 59
End: 2024-03-01

## 2024-03-01 DIAGNOSIS — G89.29 CHRONIC BILATERAL LOW BACK PAIN WITHOUT SCIATICA: ICD-10-CM

## 2024-03-01 DIAGNOSIS — M54.50 CHRONIC BILATERAL LOW BACK PAIN WITHOUT SCIATICA: ICD-10-CM

## 2024-03-01 RX ORDER — HYDROCODONE BITARTRATE AND ACETAMINOPHEN 5; 325 MG/1; MG/1
1 TABLET ORAL 2 TIMES DAILY PRN
Qty: 60 TABLET | Refills: 0 | Status: SHIPPED | OUTPATIENT
Start: 2024-03-01 | End: 2024-03-31

## 2024-03-01 NOTE — TELEPHONE ENCOUNTER
Meds verified in Epic last filled 1/29/24 for #60/30 days    Last OV: 11/29/23  Next OV: Visit date not found

## 2024-03-01 NOTE — TELEPHONE ENCOUNTER
From: Daija Cha  To: Dr. Diego Weems  Sent: 3/1/2024 12:26 AM EST  Subject: Refill     Refill norco please

## 2024-03-04 RX ORDER — PANTOPRAZOLE SODIUM 40 MG/1
40 TABLET, DELAYED RELEASE ORAL DAILY
Qty: 90 TABLET | Refills: 0 | Status: SHIPPED | OUTPATIENT
Start: 2024-03-04 | End: 2024-06-02

## 2024-03-04 NOTE — TELEPHONE ENCOUNTER
Daija called requesting a refill of the below medication which has been pended for you:     Requested Prescriptions     Pending Prescriptions Disp Refills    pantoprazole (PROTONIX) 40 MG tablet [Pharmacy Med Name: PANTOPRAZOLE SOD DR 40 MG TAB] 90 tablet 0     Sig: take 1 tablet by mouth daily       Last Appointment Date: 11/29/2023  Next Appointment Date: Visit date not found    Allergies   Allergen Reactions    Naproxen      Nausea/shaky feeling    Requip [Ropinirole] Other (See Comments)     Hypotension and syncope.

## 2024-03-07 RX ORDER — TRAZODONE HYDROCHLORIDE 50 MG/1
50 TABLET ORAL NIGHTLY
Qty: 30 TABLET | Refills: 11 | Status: SHIPPED | OUTPATIENT
Start: 2024-03-07

## 2024-03-07 NOTE — TELEPHONE ENCOUNTER
Daija called requesting a refill of the below medication which has been pended for you:     Requested Prescriptions     Pending Prescriptions Disp Refills    traZODone (DESYREL) 50 MG tablet 30 tablet 11     Sig: Take 1 tablet by mouth nightly       Last Appointment Date: 11/29/2023  Next Appointment Date: Visit date not found    Allergies   Allergen Reactions    Naproxen      Nausea/shaky feeling    Requip [Ropinirole] Other (See Comments)     Hypotension and syncope.

## 2024-03-08 ENCOUNTER — PATIENT MESSAGE (OUTPATIENT)
Dept: FAMILY MEDICINE CLINIC | Age: 59
End: 2024-03-08

## 2024-03-08 DIAGNOSIS — F41.9 ANXIETY: ICD-10-CM

## 2024-03-08 NOTE — TELEPHONE ENCOUNTER
Meds verified in Epic last filled 2/12/24 for #60/30 days    Last OV: 11/29/23  Next OV: Visit date not found

## 2024-03-08 NOTE — TELEPHONE ENCOUNTER
From: Daija Cha  To: Dr. Diego Weems  Sent: 3/8/2024 9:43 AM EST  Subject: Refill     Can I got my xanix refilled

## 2024-03-11 RX ORDER — ALPRAZOLAM 1 MG/1
TABLET ORAL
Qty: 60 TABLET | Refills: 0 | Status: SHIPPED | OUTPATIENT
Start: 2024-03-11 | End: 2024-03-24

## 2024-03-29 DIAGNOSIS — G89.29 CHRONIC BILATERAL LOW BACK PAIN WITHOUT SCIATICA: ICD-10-CM

## 2024-03-29 DIAGNOSIS — M54.50 CHRONIC BILATERAL LOW BACK PAIN WITHOUT SCIATICA: ICD-10-CM

## 2024-03-29 PROBLEM — M19.032 PRIMARY OSTEOARTHRITIS, LEFT WRIST: Status: ACTIVE | Noted: 2023-09-22

## 2024-03-29 PROBLEM — Z96.692: Status: ACTIVE | Noted: 2024-01-02

## 2024-03-29 PROBLEM — M72.0 PALMAR FASCIAL FIBROMATOSIS (DUPUYTREN): Status: ACTIVE | Noted: 2024-01-05

## 2024-03-29 PROBLEM — G56.02 LEFT CARPAL TUNNEL SYNDROME: Status: ACTIVE | Noted: 2023-09-22

## 2024-03-29 PROBLEM — M19.032 LOCALIZED PRIMARY OSTEOARTHRITIS OF CARPOMETACARPAL (CMC) JOINT OF LEFT WRIST: Status: ACTIVE | Noted: 2023-09-22

## 2024-03-29 PROBLEM — Z98.890 OTHER SPECIFIED POSTPROCEDURAL STATES: Status: ACTIVE | Noted: 2024-01-02

## 2024-03-29 RX ORDER — TRAZODONE HYDROCHLORIDE 50 MG/1
50 TABLET ORAL NIGHTLY
Qty: 30 TABLET | Refills: 11 | Status: SHIPPED | OUTPATIENT
Start: 2024-03-29

## 2024-03-29 RX ORDER — HYDROCODONE BITARTRATE AND ACETAMINOPHEN 5; 325 MG/1; MG/1
1 TABLET ORAL 2 TIMES DAILY PRN
Qty: 60 TABLET | Refills: 0 | Status: SHIPPED | OUTPATIENT
Start: 2024-03-29 | End: 2024-04-28

## 2024-03-29 NOTE — TELEPHONE ENCOUNTER
Daija called requesting a refill of the below medication which has been pended for you:     Per OARRS, last fill 3/1, quantity 60 for 30 days.      Requested Prescriptions     Pending Prescriptions Disp Refills    HYDROcodone-acetaminophen (NORCO) 5-325 MG per tablet 60 tablet 0     Sig: Take 1 tablet by mouth 2 times daily as needed for Pain for up to 30 days.    traZODone (DESYREL) 50 MG tablet 30 tablet 11     Sig: Take 1 tablet by mouth nightly       Last Appointment Date: 11/29/2023  Next Appointment Date: Visit date not found    Allergies   Allergen Reactions    Naproxen      Nausea/shaky feeling    Requip [Ropinirole] Other (See Comments)     Hypotension and syncope.

## 2024-04-14 ENCOUNTER — PATIENT MESSAGE (OUTPATIENT)
Dept: FAMILY MEDICINE CLINIC | Age: 59
End: 2024-04-14

## 2024-04-14 DIAGNOSIS — F41.9 ANXIETY: ICD-10-CM

## 2024-04-15 ENCOUNTER — HOSPITAL ENCOUNTER (OUTPATIENT)
Age: 59
Discharge: HOME | End: 2024-04-15
Payer: COMMERCIAL

## 2024-04-15 DIAGNOSIS — F41.9 ANXIETY: ICD-10-CM

## 2024-04-15 LAB
ALBUMIN SERPL-MCNC: 3.9 G/DL (ref 3.5–5.2)
ALBUMIN/GLOB SERPL: 1.4 {RATIO} (ref 1–2.5)
ALP SERPL-CCNC: 83 U/L (ref 35–104)
ALT SERPL-CCNC: 28 U/L (ref 5–33)
ANION GAP SERPL CALCULATED.3IONS-SCNC: 10 MMOL/L (ref 9–17)
AST SERPL-CCNC: 19 U/L
BASOPHILS # BLD: 0.03 K/UL (ref 0–0.2)
BASOPHILS NFR BLD: 1 % (ref 0–2)
BILIRUB SERPL-MCNC: 0.3 MG/DL (ref 0.3–1.2)
BUN SERPL-MCNC: 15 MG/DL (ref 6–20)
BUN/CREAT SERPL: 19 (ref 9–20)
CALCIUM SERPL-MCNC: 9.1 MG/DL (ref 8.6–10.4)
CHLORIDE SERPL-SCNC: 103 MMOL/L (ref 98–107)
CO2 SERPL-SCNC: 24 MMOL/L (ref 20–31)
CREAT SERPL-MCNC: 0.8 MG/DL (ref 0.5–0.9)
EOSINOPHIL # BLD: 0.19 K/UL (ref 0–0.44)
EOSINOPHILS RELATIVE PERCENT: 4 % (ref 1–4)
ERYTHROCYTE [DISTWIDTH] IN BLOOD BY AUTOMATED COUNT: 12 % (ref 11.8–14.4)
GFR SERPL CREATININE-BSD FRML MDRD: 85 ML/MIN/1.73M2
GLUCOSE SERPL-MCNC: 100 MG/DL (ref 70–99)
HCT VFR BLD AUTO: 42.2 % (ref 36.3–47.1)
HGB BLD-MCNC: 13.9 G/DL (ref 11.9–15.1)
IMM GRANULOCYTES # BLD AUTO: <0.03 K/UL (ref 0–0.3)
IMM GRANULOCYTES NFR BLD: 0 %
LYMPHOCYTES NFR BLD: 1.5 K/UL (ref 1.1–3.7)
LYMPHOCYTES RELATIVE PERCENT: 33 % (ref 24–43)
MCH RBC QN AUTO: 31 PG (ref 25.2–33.5)
MCHC RBC AUTO-ENTMCNC: 32.9 G/DL (ref 25.2–33.5)
MCV RBC AUTO: 94 FL (ref 82.6–102.9)
MONOCYTES NFR BLD: 0.49 K/UL (ref 0.1–1.2)
MONOCYTES NFR BLD: 11 % (ref 3–12)
NEUTROPHILS NFR BLD: 51 % (ref 36–65)
NEUTS SEG NFR BLD: 2.31 K/UL (ref 1.5–8.1)
NRBC BLD-RTO: 0 PER 100 WBC
PLATELET # BLD AUTO: 264 K/UL (ref 138–453)
PMV BLD AUTO: 9 FL (ref 8.1–13.5)
POTASSIUM SERPL-SCNC: 4.3 MMOL/L (ref 3.7–5.3)
PROT SERPL-MCNC: 6.7 G/DL (ref 6.4–8.3)
RBC # BLD AUTO: 4.49 M/UL (ref 3.95–5.11)
SODIUM SERPL-SCNC: 137 MMOL/L (ref 135–144)
WBC OTHER # BLD: 4.5 K/UL (ref 3.5–11.3)

## 2024-04-15 PROCEDURE — 85025 COMPLETE CBC W/AUTO DIFF WBC: CPT

## 2024-04-15 PROCEDURE — 36415 COLL VENOUS BLD VENIPUNCTURE: CPT

## 2024-04-15 PROCEDURE — 80053 COMPREHEN METABOLIC PANEL: CPT

## 2024-04-15 RX ORDER — ALPRAZOLAM 1 MG/1
TABLET ORAL
Qty: 60 TABLET | OUTPATIENT
Start: 2024-04-15

## 2024-04-15 RX ORDER — ALPRAZOLAM 1 MG/1
TABLET ORAL
Qty: 60 TABLET | Refills: 0 | Status: SHIPPED | OUTPATIENT
Start: 2024-04-15 | End: 2024-04-28

## 2024-04-15 NOTE — TELEPHONE ENCOUNTER
Daija called requesting a refill of the below medication which has been pended for you:     Per OARRS, last fill 3/15, quantity 60 for 30 days.      Requested Prescriptions     Pending Prescriptions Disp Refills    ALPRAZolam (XANAX) 1 MG tablet 60 tablet 0     Sig: take 1 tablet by mouth twice a day       Last Appointment Date: 11/29/2023  Next Appointment Date: Visit date not found    Allergies   Allergen Reactions    Naproxen      Nausea/shaky feeling    Requip [Ropinirole] Other (See Comments)     Hypotension and syncope.

## 2024-04-15 NOTE — TELEPHONE ENCOUNTER
From: Daija Cha  To: Dr. Diego Weems  Sent: 4/14/2024 10:34 AM EDT  Subject: Refill    Zanix refill thank u

## 2024-04-29 ENCOUNTER — PATIENT MESSAGE (OUTPATIENT)
Dept: FAMILY MEDICINE CLINIC | Age: 59
End: 2024-04-29

## 2024-04-29 DIAGNOSIS — G62.9 NEUROPATHY: ICD-10-CM

## 2024-04-29 DIAGNOSIS — M54.50 CHRONIC BILATERAL LOW BACK PAIN WITHOUT SCIATICA: ICD-10-CM

## 2024-04-29 DIAGNOSIS — G89.29 CHRONIC BILATERAL LOW BACK PAIN WITHOUT SCIATICA: ICD-10-CM

## 2024-04-29 RX ORDER — HYDROCODONE BITARTRATE AND ACETAMINOPHEN 5; 325 MG/1; MG/1
1 TABLET ORAL 2 TIMES DAILY PRN
Qty: 60 TABLET | Refills: 0 | Status: SHIPPED | OUTPATIENT
Start: 2024-04-29 | End: 2024-05-29

## 2024-04-29 NOTE — TELEPHONE ENCOUNTER
Meds verified in Epic last filled 3/31/24 for #60/30 days    Last OV: 11/29/23  Next OV: Visit date not found

## 2024-04-29 NOTE — TELEPHONE ENCOUNTER
From: Daija Cha  To: Dr. Diego Weems  Sent: 4/29/2024 1:29 PM EDT  Subject: Refill     Norco refilled and why is that and my zanix not on my chart any more

## 2024-04-29 NOTE — TELEPHONE ENCOUNTER
Meds verified in Epic last filled 12/12/23 for #180/90 days      Daija called requesting a refill of the below medication which has been pended for you:     Requested Prescriptions     Pending Prescriptions Disp Refills    gabapentin (NEURONTIN) 100 MG capsule 180 capsule 0     Sig: Take 1 capsule by mouth 2 times daily for 90 days. Intended supply: 90 days    triamcinolone (KENALOG) 0.1 % cream 80 g 2     Sig: Apply topically 2 times daily.    traZODone (DESYREL) 50 MG tablet 30 tablet 11     Sig: Take 1 tablet by mouth nightly       Last Appointment Date: 11/29/2023  Next Appointment Date: Visit date not found    Allergies   Allergen Reactions    Naproxen      Nausea/shaky feeling    Requip [Ropinirole] Other (See Comments)     Hypotension and syncope.

## 2024-04-30 RX ORDER — TRIAMCINOLONE ACETONIDE 1 MG/G
CREAM TOPICAL
Qty: 80 G | Refills: 2 | Status: SHIPPED | OUTPATIENT
Start: 2024-04-30

## 2024-04-30 RX ORDER — TRAZODONE HYDROCHLORIDE 50 MG/1
50 TABLET ORAL NIGHTLY
Qty: 30 TABLET | Refills: 11 | OUTPATIENT
Start: 2024-04-30

## 2024-04-30 RX ORDER — GABAPENTIN 100 MG/1
100 CAPSULE ORAL 2 TIMES DAILY
Qty: 180 CAPSULE | Refills: 0 | Status: SHIPPED | OUTPATIENT
Start: 2024-04-30 | End: 2024-07-29

## 2024-05-14 ENCOUNTER — PATIENT MESSAGE (OUTPATIENT)
Dept: FAMILY MEDICINE CLINIC | Age: 59
End: 2024-05-14

## 2024-05-14 DIAGNOSIS — F41.9 ANXIETY: ICD-10-CM

## 2024-05-14 RX ORDER — ALPRAZOLAM 1 MG/1
TABLET ORAL
Qty: 60 TABLET | Refills: 0 | Status: SHIPPED | OUTPATIENT
Start: 2024-05-14 | End: 2024-05-27

## 2024-05-14 NOTE — TELEPHONE ENCOUNTER
From: Daija Cha  To: Dr. Diego Weems  Sent: 5/14/2024 10:14 AM EDT  Subject: Refill     Zanix refilled please  
Meds verified in Epic last filled 4/15/24 for #60/30 days    Last OV: 11/29/23  Next OV: Visit date not found  
n/a

## 2024-05-27 DIAGNOSIS — M54.50 CHRONIC BILATERAL LOW BACK PAIN WITHOUT SCIATICA: ICD-10-CM

## 2024-05-27 DIAGNOSIS — G89.29 CHRONIC BILATERAL LOW BACK PAIN WITHOUT SCIATICA: ICD-10-CM

## 2024-05-28 NOTE — TELEPHONE ENCOUNTER
Meds verified in Epic last filled 4/30/24 for #60/30 days    Last OV: 11/29/23  Next OV: Visit date not found

## 2024-05-29 DIAGNOSIS — G89.29 CHRONIC BILATERAL LOW BACK PAIN WITHOUT SCIATICA: ICD-10-CM

## 2024-05-29 DIAGNOSIS — M54.50 CHRONIC BILATERAL LOW BACK PAIN WITHOUT SCIATICA: ICD-10-CM

## 2024-05-29 RX ORDER — HYDROCODONE BITARTRATE AND ACETAMINOPHEN 5; 325 MG/1; MG/1
1 TABLET ORAL 2 TIMES DAILY PRN
Qty: 60 TABLET | Refills: 0 | OUTPATIENT
Start: 2024-05-29 | End: 2024-06-28

## 2024-05-29 RX ORDER — HYDROCODONE BITARTRATE AND ACETAMINOPHEN 5; 325 MG/1; MG/1
1 TABLET ORAL 2 TIMES DAILY PRN
Qty: 60 TABLET | Refills: 0 | Status: SHIPPED | OUTPATIENT
Start: 2024-05-29 | End: 2024-06-28

## 2024-06-14 ENCOUNTER — PATIENT MESSAGE (OUTPATIENT)
Dept: FAMILY MEDICINE CLINIC | Age: 59
End: 2024-06-14

## 2024-06-14 DIAGNOSIS — F41.9 ANXIETY: ICD-10-CM

## 2024-06-14 RX ORDER — ALPRAZOLAM 1 MG/1
TABLET ORAL
Qty: 60 TABLET | Refills: 0 | Status: SHIPPED | OUTPATIENT
Start: 2024-06-14 | End: 2024-06-27

## 2024-06-14 NOTE — TELEPHONE ENCOUNTER
From: Daija Cha  To: Dr. Diego Weems  Sent: 6/14/2024 7:14 AM EDT  Subject: Refill    Xanix refilled please

## 2024-06-14 NOTE — TELEPHONE ENCOUNTER
Meds verified in Epic last filled 5/15/24 for #60/30 days    Last OV: 11/29/23  Next OV: Visit date not found

## 2024-06-27 ENCOUNTER — OFFICE VISIT (OUTPATIENT)
Dept: FAMILY MEDICINE CLINIC | Age: 59
End: 2024-06-27
Payer: COMMERCIAL

## 2024-06-27 VITALS
HEART RATE: 68 BPM | BODY MASS INDEX: 30.08 KG/M2 | WEIGHT: 159.2 LBS | DIASTOLIC BLOOD PRESSURE: 82 MMHG | OXYGEN SATURATION: 98 % | SYSTOLIC BLOOD PRESSURE: 130 MMHG

## 2024-06-27 DIAGNOSIS — M54.50 CHRONIC BILATERAL LOW BACK PAIN WITHOUT SCIATICA: ICD-10-CM

## 2024-06-27 DIAGNOSIS — M18.0 ARTHRITIS OF CARPOMETACARPAL (CMC) JOINT OF BOTH THUMBS: ICD-10-CM

## 2024-06-27 DIAGNOSIS — G89.29 CHRONIC BILATERAL LOW BACK PAIN WITHOUT SCIATICA: ICD-10-CM

## 2024-06-27 DIAGNOSIS — R73.01 IMPAIRED FASTING BLOOD SUGAR: ICD-10-CM

## 2024-06-27 DIAGNOSIS — Z12.11 COLON CANCER SCREENING: ICD-10-CM

## 2024-06-27 DIAGNOSIS — E78.00 PURE HYPERCHOLESTEROLEMIA: ICD-10-CM

## 2024-06-27 DIAGNOSIS — L40.9 PSORIASIS: ICD-10-CM

## 2024-06-27 DIAGNOSIS — Z12.31 ENCOUNTER FOR SCREENING MAMMOGRAM FOR BREAST CANCER: ICD-10-CM

## 2024-06-27 DIAGNOSIS — F41.9 ANXIETY: Primary | ICD-10-CM

## 2024-06-27 PROCEDURE — 99214 OFFICE O/P EST MOD 30 MIN: CPT | Performed by: FAMILY MEDICINE

## 2024-06-27 RX ORDER — HYDROCODONE BITARTRATE AND ACETAMINOPHEN 5; 325 MG/1; MG/1
1 TABLET ORAL 2 TIMES DAILY PRN
Qty: 60 TABLET | Refills: 0 | Status: SHIPPED | OUTPATIENT
Start: 2024-06-27 | End: 2024-07-27

## 2024-06-27 RX ORDER — CLOBETASOL PROPIONATE 0.46 MG/ML
1 SOLUTION TOPICAL 2 TIMES DAILY
COMMUNITY
Start: 2024-04-15

## 2024-06-27 SDOH — ECONOMIC STABILITY: FOOD INSECURITY: WITHIN THE PAST 12 MONTHS, YOU WORRIED THAT YOUR FOOD WOULD RUN OUT BEFORE YOU GOT MONEY TO BUY MORE.: NEVER TRUE

## 2024-06-27 SDOH — ECONOMIC STABILITY: INCOME INSECURITY: HOW HARD IS IT FOR YOU TO PAY FOR THE VERY BASICS LIKE FOOD, HOUSING, MEDICAL CARE, AND HEATING?: NOT HARD AT ALL

## 2024-06-27 SDOH — ECONOMIC STABILITY: FOOD INSECURITY: WITHIN THE PAST 12 MONTHS, THE FOOD YOU BOUGHT JUST DIDN'T LAST AND YOU DIDN'T HAVE MONEY TO GET MORE.: NEVER TRUE

## 2024-06-27 ASSESSMENT — PATIENT HEALTH QUESTIONNAIRE - PHQ9
2. FEELING DOWN, DEPRESSED OR HOPELESS: NOT AT ALL
SUM OF ALL RESPONSES TO PHQ QUESTIONS 1-9: 0
3. TROUBLE FALLING OR STAYING ASLEEP: NOT AT ALL
5. POOR APPETITE OR OVEREATING: NOT AT ALL
10. IF YOU CHECKED OFF ANY PROBLEMS, HOW DIFFICULT HAVE THESE PROBLEMS MADE IT FOR YOU TO DO YOUR WORK, TAKE CARE OF THINGS AT HOME, OR GET ALONG WITH OTHER PEOPLE: NOT DIFFICULT AT ALL
6. FEELING BAD ABOUT YOURSELF - OR THAT YOU ARE A FAILURE OR HAVE LET YOURSELF OR YOUR FAMILY DOWN: NOT AT ALL
1. LITTLE INTEREST OR PLEASURE IN DOING THINGS: NOT AT ALL
8. MOVING OR SPEAKING SO SLOWLY THAT OTHER PEOPLE COULD HAVE NOTICED. OR THE OPPOSITE, BEING SO FIGETY OR RESTLESS THAT YOU HAVE BEEN MOVING AROUND A LOT MORE THAN USUAL: NOT AT ALL
SUM OF ALL RESPONSES TO PHQ QUESTIONS 1-9: 0
9. THOUGHTS THAT YOU WOULD BE BETTER OFF DEAD, OR OF HURTING YOURSELF: NOT AT ALL
7. TROUBLE CONCENTRATING ON THINGS, SUCH AS READING THE NEWSPAPER OR WATCHING TELEVISION: NOT AT ALL
SUM OF ALL RESPONSES TO PHQ QUESTIONS 1-9: 0
SUM OF ALL RESPONSES TO PHQ QUESTIONS 1-9: 0
SUM OF ALL RESPONSES TO PHQ9 QUESTIONS 1 & 2: 0

## 2024-06-27 ASSESSMENT — ENCOUNTER SYMPTOMS
GASTROINTESTINAL NEGATIVE: 1
EYES NEGATIVE: 1
BACK PAIN: 1
RESPIRATORY NEGATIVE: 1

## 2024-06-27 NOTE — PATIENT INSTRUCTIONS
Hospital Outpatient Visit on 04/15/2024   Component Date Value Ref Range Status    WBC 04/15/2024 4.5  3.5 - 11.3 k/uL Final    RBC 04/15/2024 4.49  3.95 - 5.11 m/uL Final    Hemoglobin 04/15/2024 13.9  11.9 - 15.1 g/dL Final    Hematocrit 04/15/2024 42.2  36.3 - 47.1 % Final    MCV 04/15/2024 94.0  82.6 - 102.9 fL Final    MCH 04/15/2024 31.0  25.2 - 33.5 pg Final    MCHC 04/15/2024 32.9  25.2 - 33.5 g/dL Final    RDW 04/15/2024 12.0  11.8 - 14.4 % Final    Platelets 04/15/2024 264  138 - 453 k/uL Final    MPV 04/15/2024 9.0  8.1 - 13.5 fL Final    NRBC Automated 04/15/2024 0.0  0.0 per 100 WBC Final    Neutrophils % 04/15/2024 51  36 - 65 % Final    Lymphocytes % 04/15/2024 33  24 - 43 % Final    Monocytes % 04/15/2024 11  3 - 12 % Final    Eosinophils % 04/15/2024 4  1 - 4 % Final    Basophils % 04/15/2024 1  0 - 2 % Final    Immature Granulocytes % 04/15/2024 0  0 % Final    Neutrophils Absolute 04/15/2024 2.31  1.50 - 8.10 k/uL Final    Lymphocytes Absolute 04/15/2024 1.50  1.10 - 3.70 k/uL Final    Monocytes Absolute 04/15/2024 0.49  0.10 - 1.20 k/uL Final    Eosinophils Absolute 04/15/2024 0.19  0.00 - 0.44 k/uL Final    Basophils Absolute 04/15/2024 0.03  0.00 - 0.20 k/uL Final    Immature Granulocytes Absolute 04/15/2024 <0.03  0.00 - 0.30 k/uL Final    Sodium 04/15/2024 137  135 - 144 mmol/L Final    Potassium 04/15/2024 4.3  3.7 - 5.3 mmol/L Final    Chloride 04/15/2024 103  98 - 107 mmol/L Final    CO2 04/15/2024 24  20 - 31 mmol/L Final    Anion Gap 04/15/2024 10  9 - 17 mmol/L Final    Glucose 04/15/2024 100 (H)  70 - 99 mg/dL Final    BUN 04/15/2024 15  6 - 20 mg/dL Final    Creatinine 04/15/2024 0.8  0.5 - 0.9 mg/dL Final    Est, Glom Filt Rate 04/15/2024 85  >60 mL/min/1.73m2 Final    Comment:       These results are not intended for use in patients <18 years of age.        eGFR results are calculated without a race factor using the 2021 CKD-EPI equation.  Careful clinical correlation is

## 2024-06-27 NOTE — PROGRESS NOTES
Subjective:      Patient ID: Daija Cha is a 59 y.o. female.    HPI   Routine follow up on chronic medical conditions, refills, and review of updated labs.  I have reviewed the patient's medical history in detail and updated the computerized patient record.   Doing ok at present.  Ongoing back pain concerns.  Some neck pain ongoing.  Left knee pain right hip pains as well.  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    Living with her son Ton, his wife and 2 grandchildren.  Psoriasis rash with improvement, healed up.  Seeing derm and getting injectable that is works well.  .  No headache issues.  Quit smoking over 2 years now.  Remains compliant.  Cut back on alcohol.  Social use. Not using marijuana in years.      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    Tobacco abuse      Past Surgical History:   Procedure Laterality Date    ARTHROPLASTY Right 2022    Right Taylor's ARTHROPLASTY -123 performed by Laura Angulo MD at [facility] OR    CARPAL TUNNEL RELEASE Right 2022    Right Carpal & Cubital Tunnel Releases with ulnar nerve transposition  performed by Laura Angulo MD at [facility] OR    CARPAL TUNNEL RELEASE Left 10/20/2023    [facility] Dr. Angulo     SECTION  1992    COLPOSCOPY  2002    With laser cone biopsy of the cervix.    TUBAL LIGATION      US BREAST BIOPSY W LOC DEVICE 1ST LESION LEFT Left 2023    US BREAST BIOPSY W LOC DEVICE 1ST LESION LEFT 3/27/2023 [facility] ULTRASOUND     Current Outpatient Medications   Medication Sig Dispense Refill    Ixekizumab (TALTZ SC) Inject 1 Dose into the skin every 30 days      ALPRAZolam (XANAX) 1 MG tablet take 1 tablet by

## 2024-07-03 ENCOUNTER — HOSPITAL ENCOUNTER (OUTPATIENT)
Age: 59
Discharge: HOME | End: 2024-07-03
Payer: COMMERCIAL

## 2024-07-03 DIAGNOSIS — E78.00 PURE HYPERCHOLESTEROLEMIA: ICD-10-CM

## 2024-07-03 DIAGNOSIS — K21.00 GASTROESOPHAGEAL REFLUX DISEASE WITH ESOPHAGITIS WITHOUT HEMORRHAGE: ICD-10-CM

## 2024-07-03 DIAGNOSIS — R73.01 IMPAIRED FASTING BLOOD SUGAR: ICD-10-CM

## 2024-07-03 LAB
ALBUMIN SERPL-MCNC: 4.1 G/DL (ref 3.5–5.2)
ALBUMIN/GLOB SERPL: 1.4 {RATIO} (ref 1–2.5)
ALP SERPL-CCNC: 92 U/L (ref 35–104)
ALT SERPL-CCNC: 30 U/L (ref 5–33)
ANION GAP SERPL CALCULATED.3IONS-SCNC: 11 MMOL/L (ref 9–17)
AST SERPL-CCNC: 23 U/L
BASOPHILS # BLD: 0.04 K/UL (ref 0–0.2)
BASOPHILS NFR BLD: 1 % (ref 0–2)
BILIRUB SERPL-MCNC: 0.4 MG/DL (ref 0.3–1.2)
BUN SERPL-MCNC: 15 MG/DL (ref 6–20)
BUN/CREAT SERPL: 19 (ref 9–20)
CALCIUM SERPL-MCNC: 9.5 MG/DL (ref 8.6–10.4)
CHLORIDE SERPL-SCNC: 104 MMOL/L (ref 98–107)
CHOLEST SERPL-MCNC: 214 MG/DL (ref 0–199)
CHOLESTEROL/HDL RATIO: 4
CO2 SERPL-SCNC: 24 MMOL/L (ref 20–31)
CREAT SERPL-MCNC: 0.8 MG/DL (ref 0.5–0.9)
EOSINOPHIL # BLD: 0.21 K/UL (ref 0–0.44)
EOSINOPHILS RELATIVE PERCENT: 4 % (ref 1–4)
ERYTHROCYTE [DISTWIDTH] IN BLOOD BY AUTOMATED COUNT: 12.6 % (ref 11.8–14.4)
EST. AVERAGE GLUCOSE BLD GHB EST-MCNC: 100 MG/DL
GFR, ESTIMATED: 85 ML/MIN/1.73M2
GLUCOSE SERPL-MCNC: 98 MG/DL (ref 70–99)
HBA1C MFR BLD: 5.1 % (ref 4–6)
HCT VFR BLD AUTO: 42.3 % (ref 36.3–47.1)
HDLC SERPL-MCNC: 58 MG/DL
HGB BLD-MCNC: 14.1 G/DL (ref 11.9–15.1)
IMM GRANULOCYTES # BLD AUTO: <0.03 K/UL (ref 0–0.3)
IMM GRANULOCYTES NFR BLD: 0 %
LDLC SERPL CALC-MCNC: 121 MG/DL (ref 0–100)
LYMPHOCYTES NFR BLD: 1.52 K/UL (ref 1.1–3.7)
LYMPHOCYTES RELATIVE PERCENT: 29 % (ref 24–43)
MCH RBC QN AUTO: 31.8 PG (ref 25.2–33.5)
MCHC RBC AUTO-ENTMCNC: 33.3 G/DL (ref 25.2–33.5)
MCV RBC AUTO: 95.5 FL (ref 82.6–102.9)
MONOCYTES NFR BLD: 0.55 K/UL (ref 0.1–1.2)
MONOCYTES NFR BLD: 11 % (ref 3–12)
NEUTROPHILS NFR BLD: 55 % (ref 36–65)
NEUTS SEG NFR BLD: 2.87 K/UL (ref 1.5–8.1)
NRBC BLD-RTO: 0 PER 100 WBC
PLATELET # BLD AUTO: 278 K/UL (ref 138–453)
PMV BLD AUTO: 9 FL (ref 8.1–13.5)
POTASSIUM SERPL-SCNC: 4.2 MMOL/L (ref 3.7–5.3)
PROT SERPL-MCNC: 7.1 G/DL (ref 6.4–8.3)
RBC # BLD AUTO: 4.43 M/UL (ref 3.95–5.11)
SODIUM SERPL-SCNC: 139 MMOL/L (ref 135–144)
TRIGL SERPL-MCNC: 174 MG/DL
VLDLC SERPL CALC-MCNC: 35 MG/DL
WBC OTHER # BLD: 5.2 K/UL (ref 3.5–11.3)

## 2024-07-03 PROCEDURE — 80061 LIPID PANEL: CPT

## 2024-07-03 PROCEDURE — 83036 HEMOGLOBIN GLYCOSYLATED A1C: CPT

## 2024-07-03 PROCEDURE — 36415 COLL VENOUS BLD VENIPUNCTURE: CPT

## 2024-07-03 PROCEDURE — 80053 COMPREHEN METABOLIC PANEL: CPT

## 2024-07-03 PROCEDURE — 85025 COMPLETE CBC W/AUTO DIFF WBC: CPT

## 2024-07-15 ENCOUNTER — PATIENT MESSAGE (OUTPATIENT)
Dept: FAMILY MEDICINE CLINIC | Age: 59
End: 2024-07-15

## 2024-07-15 DIAGNOSIS — F41.9 ANXIETY: ICD-10-CM

## 2024-07-15 RX ORDER — ALPRAZOLAM 1 MG/1
TABLET ORAL
Qty: 60 TABLET | Refills: 0 | Status: SHIPPED | OUTPATIENT
Start: 2024-07-15 | End: 2024-08-14

## 2024-07-15 RX ORDER — TRAZODONE HYDROCHLORIDE 50 MG/1
50 TABLET ORAL NIGHTLY
Qty: 30 TABLET | Refills: 11 | Status: SHIPPED | OUTPATIENT
Start: 2024-07-15

## 2024-07-15 NOTE — TELEPHONE ENCOUNTER
Daija called requesting a refill of the below medication which has been pended for you:     Requested Prescriptions     Pending Prescriptions Disp Refills    traZODone (DESYREL) 50 MG tablet 30 tablet 11     Sig: Take 1 tablet by mouth nightly       Last Appointment Date: 6/27/2024  Next Appointment Date: 12/27/2024    Allergies   Allergen Reactions    Naproxen      Nausea/shaky feeling    Requip [Ropinirole] Other (See Comments)     Hypotension and syncope.

## 2024-07-15 NOTE — TELEPHONE ENCOUNTER
From: Daija Cha  To: Dr. Diego Weems  Sent: 7/15/2024 8:47 AM EDT  Subject: Refill    Xanax refilled to [facility] [place]

## 2024-07-25 DIAGNOSIS — G89.29 CHRONIC BILATERAL LOW BACK PAIN WITHOUT SCIATICA: ICD-10-CM

## 2024-07-25 DIAGNOSIS — M54.50 CHRONIC BILATERAL LOW BACK PAIN WITHOUT SCIATICA: ICD-10-CM

## 2024-07-26 RX ORDER — HYDROCODONE BITARTRATE AND ACETAMINOPHEN 5; 325 MG/1; MG/1
1 TABLET ORAL 2 TIMES DAILY PRN
Qty: 60 TABLET | Refills: 0 | Status: SHIPPED | OUTPATIENT
Start: 2024-07-27 | End: 2024-08-26

## 2024-07-28 DIAGNOSIS — G89.29 CHRONIC BILATERAL LOW BACK PAIN WITHOUT SCIATICA: ICD-10-CM

## 2024-07-28 DIAGNOSIS — M54.50 CHRONIC BILATERAL LOW BACK PAIN WITHOUT SCIATICA: ICD-10-CM

## 2024-07-29 RX ORDER — HYDROCODONE BITARTRATE AND ACETAMINOPHEN 5; 325 MG/1; MG/1
1 TABLET ORAL 2 TIMES DAILY PRN
Qty: 60 TABLET | Refills: 0 | OUTPATIENT
Start: 2024-07-29 | End: 2024-08-28

## 2024-07-30 ENCOUNTER — HOSPITAL ENCOUNTER (OUTPATIENT)
Dept: MAMMOGRAPHY | Age: 59
Discharge: HOME | End: 2024-08-01
Attending: FAMILY MEDICINE
Payer: COMMERCIAL

## 2024-07-30 VITALS — WEIGHT: 160 LBS | HEIGHT: 62 IN | BODY MASS INDEX: 29.44 KG/M2

## 2024-07-30 DIAGNOSIS — Z12.31 ENCOUNTER FOR SCREENING MAMMOGRAM FOR BREAST CANCER: ICD-10-CM

## 2024-07-30 PROCEDURE — 77063 BREAST TOMOSYNTHESIS BI: CPT

## 2024-08-12 DIAGNOSIS — F41.9 ANXIETY: ICD-10-CM

## 2024-08-12 DIAGNOSIS — G62.9 NEUROPATHY: ICD-10-CM

## 2024-08-12 RX ORDER — SIMVASTATIN 20 MG
20 TABLET ORAL NIGHTLY
Qty: 90 TABLET | Refills: 1 | Status: SHIPPED | OUTPATIENT
Start: 2024-08-12

## 2024-08-12 RX ORDER — GABAPENTIN 100 MG/1
100 CAPSULE ORAL 2 TIMES DAILY
Qty: 180 CAPSULE | Refills: 0 | Status: SHIPPED | OUTPATIENT
Start: 2024-08-12 | End: 2024-11-10

## 2024-08-12 NOTE — TELEPHONE ENCOUNTER
Meds verified in Epic last filled 4/30/24 for #180/90 days      Daija called requesting a refill of the below medication which has been pended for you:     Requested Prescriptions     Pending Prescriptions Disp Refills    simvastatin (ZOCOR) 20 MG tablet 90 tablet 1     Sig: Take 1 tablet by mouth nightly at bedtime.    gabapentin (NEURONTIN) 100 MG capsule 180 capsule 0     Sig: Take 1 capsule by mouth 2 times daily for 90 days. Intended supply: 90 days       Last Appointment Date: 6/27/2024  Next Appointment Date: 8/12/2024    Allergies   Allergen Reactions    Naproxen      Nausea/shaky feeling    Requip [Ropinirole] Other (See Comments)     Hypotension and syncope.

## 2024-08-14 RX ORDER — ALPRAZOLAM 1 MG/1
TABLET ORAL
Qty: 60 TABLET | Refills: 0 | Status: SHIPPED | OUTPATIENT
Start: 2024-08-14 | End: 2024-09-13

## 2024-08-26 DIAGNOSIS — M54.50 CHRONIC BILATERAL LOW BACK PAIN WITHOUT SCIATICA: ICD-10-CM

## 2024-08-26 DIAGNOSIS — G89.29 CHRONIC BILATERAL LOW BACK PAIN WITHOUT SCIATICA: ICD-10-CM

## 2024-08-28 RX ORDER — HYDROCODONE BITARTRATE AND ACETAMINOPHEN 5; 325 MG/1; MG/1
1 TABLET ORAL 2 TIMES DAILY PRN
Qty: 60 TABLET | Refills: 0 | Status: SHIPPED | OUTPATIENT
Start: 2024-08-28 | End: 2024-09-27

## 2024-09-16 ENCOUNTER — PATIENT MESSAGE (OUTPATIENT)
Dept: FAMILY MEDICINE CLINIC | Age: 59
End: 2024-09-16

## 2024-09-16 DIAGNOSIS — F41.9 ANXIETY: ICD-10-CM

## 2024-09-16 RX ORDER — ALPRAZOLAM 1 MG
TABLET ORAL
Qty: 60 TABLET | Refills: 0 | Status: SHIPPED | OUTPATIENT
Start: 2024-09-16 | End: 2024-10-16

## 2024-09-26 DIAGNOSIS — M54.50 CHRONIC BILATERAL LOW BACK PAIN WITHOUT SCIATICA: ICD-10-CM

## 2024-09-26 DIAGNOSIS — G89.29 CHRONIC BILATERAL LOW BACK PAIN WITHOUT SCIATICA: ICD-10-CM

## 2024-09-26 NOTE — TELEPHONE ENCOUNTER
Meds verified in Epic last filled 8/29/24 for #60/30 days      Daija called requesting a refill of the below medication which has been pended for you:     Requested Prescriptions     Pending Prescriptions Disp Refills    triamcinolone (KENALOG) 0.1 % cream 80 g 2     Sig: Apply topically 2 times daily.    HYDROcodone-acetaminophen (NORCO) 5-325 MG per tablet 60 tablet 0     Sig: Take 1 tablet by mouth 2 times daily as needed for Pain for up to 30 days.       Last Appointment Date: 6/27/2024  Next Appointment Date: 12/27/2024    Allergies   Allergen Reactions    Naproxen      Nausea/shaky feeling    Requip [Ropinirole] Other (See Comments)     Hypotension and syncope.

## 2024-09-30 RX ORDER — TRIAMCINOLONE ACETONIDE 1 MG/G
CREAM TOPICAL
Qty: 80 G | Refills: 2 | Status: SHIPPED | OUTPATIENT
Start: 2024-09-30

## 2024-09-30 RX ORDER — HYDROCODONE BITARTRATE AND ACETAMINOPHEN 5; 325 MG/1; MG/1
1 TABLET ORAL 2 TIMES DAILY PRN
Qty: 60 TABLET | Refills: 0 | Status: SHIPPED | OUTPATIENT
Start: 2024-09-30 | End: 2024-10-30

## 2024-10-15 DIAGNOSIS — F41.9 ANXIETY: ICD-10-CM

## 2024-10-15 RX ORDER — ALPRAZOLAM 1 MG
TABLET ORAL
Qty: 60 TABLET | Refills: 0 | Status: SHIPPED | OUTPATIENT
Start: 2024-10-15 | End: 2024-11-14

## 2024-10-25 DIAGNOSIS — M54.50 CHRONIC BILATERAL LOW BACK PAIN WITHOUT SCIATICA: ICD-10-CM

## 2024-10-25 DIAGNOSIS — G89.29 CHRONIC BILATERAL LOW BACK PAIN WITHOUT SCIATICA: ICD-10-CM

## 2024-10-28 DIAGNOSIS — M54.50 CHRONIC BILATERAL LOW BACK PAIN WITHOUT SCIATICA: ICD-10-CM

## 2024-10-28 DIAGNOSIS — G89.29 CHRONIC BILATERAL LOW BACK PAIN WITHOUT SCIATICA: ICD-10-CM

## 2024-10-30 DIAGNOSIS — M54.50 CHRONIC BILATERAL LOW BACK PAIN WITHOUT SCIATICA: ICD-10-CM

## 2024-10-30 DIAGNOSIS — G89.29 CHRONIC BILATERAL LOW BACK PAIN WITHOUT SCIATICA: ICD-10-CM

## 2024-10-30 RX ORDER — HYDROCODONE BITARTRATE AND ACETAMINOPHEN 5; 325 MG/1; MG/1
1 TABLET ORAL 2 TIMES DAILY PRN
Qty: 60 TABLET | Refills: 0 | OUTPATIENT
Start: 2024-10-30 | End: 2024-11-29

## 2024-10-30 RX ORDER — HYDROCODONE BITARTRATE AND ACETAMINOPHEN 5; 325 MG/1; MG/1
1 TABLET ORAL 2 TIMES DAILY PRN
Qty: 60 TABLET | Refills: 0 | Status: SHIPPED | OUTPATIENT
Start: 2024-10-30 | End: 2024-11-29

## 2024-11-11 DIAGNOSIS — G62.9 NEUROPATHY: ICD-10-CM

## 2024-11-11 RX ORDER — GABAPENTIN 100 MG/1
100 CAPSULE ORAL 2 TIMES DAILY
Qty: 180 CAPSULE | Refills: 0 | Status: SHIPPED | OUTPATIENT
Start: 2024-11-11 | End: 2025-02-09

## 2024-11-15 ENCOUNTER — PATIENT MESSAGE (OUTPATIENT)
Dept: FAMILY MEDICINE CLINIC | Age: 59
End: 2024-11-15

## 2024-11-15 DIAGNOSIS — F41.9 ANXIETY: ICD-10-CM

## 2024-11-15 RX ORDER — ALPRAZOLAM 1 MG/1
TABLET ORAL
Qty: 60 TABLET | Refills: 0 | Status: SHIPPED | OUTPATIENT
Start: 2024-11-15 | End: 2024-12-15

## 2024-11-21 RX ORDER — SIMVASTATIN 20 MG
20 TABLET ORAL NIGHTLY
Qty: 90 TABLET | Refills: 1 | OUTPATIENT
Start: 2024-11-21

## 2024-11-27 DIAGNOSIS — G89.29 CHRONIC BILATERAL LOW BACK PAIN WITHOUT SCIATICA: ICD-10-CM

## 2024-11-27 DIAGNOSIS — M54.50 CHRONIC BILATERAL LOW BACK PAIN WITHOUT SCIATICA: ICD-10-CM

## 2024-11-27 RX ORDER — HYDROCODONE BITARTRATE AND ACETAMINOPHEN 5; 325 MG/1; MG/1
1 TABLET ORAL 2 TIMES DAILY PRN
Qty: 60 TABLET | Refills: 0 | Status: SHIPPED | OUTPATIENT
Start: 2024-11-27 | End: 2024-12-27

## 2024-12-13 DIAGNOSIS — F41.9 ANXIETY: ICD-10-CM

## 2024-12-13 NOTE — TELEPHONE ENCOUNTER
Per OARRS, last fill 11/18/2024, quantity 60 for 30 days. Not due until 12/18/24    Daija called requesting a refill of the below medication which has been pended for you: SENT MESSAGE TO R/S 6M F/U    Requested Prescriptions     Pending Prescriptions Disp Refills    ALPRAZolam (XANAX) 1 MG tablet 60 tablet 0     Sig: take 1 tablet by mouth twice a day       Last Appointment Date: 6/27/2024  Next Appointment Date: Visit date not found    Allergies   Allergen Reactions    Naproxen      Nausea/shaky feeling    Requip [Ropinirole] Other (See Comments)     Hypotension and syncope.

## 2024-12-18 RX ORDER — ALPRAZOLAM 1 MG/1
TABLET ORAL
Qty: 60 TABLET | Refills: 0 | Status: SHIPPED | OUTPATIENT
Start: 2024-12-18 | End: 2025-01-12

## 2024-12-30 ENCOUNTER — OFFICE VISIT (OUTPATIENT)
Dept: FAMILY MEDICINE CLINIC | Age: 59
End: 2024-12-30
Payer: COMMERCIAL

## 2024-12-30 VITALS
SYSTOLIC BLOOD PRESSURE: 128 MMHG | BODY MASS INDEX: 30.33 KG/M2 | HEART RATE: 79 BPM | HEIGHT: 62 IN | WEIGHT: 164.8 LBS | DIASTOLIC BLOOD PRESSURE: 86 MMHG | OXYGEN SATURATION: 96 %

## 2024-12-30 DIAGNOSIS — R73.01 IMPAIRED FASTING BLOOD SUGAR: ICD-10-CM

## 2024-12-30 DIAGNOSIS — Z12.11 COLON CANCER SCREENING: ICD-10-CM

## 2024-12-30 DIAGNOSIS — K21.00 GASTROESOPHAGEAL REFLUX DISEASE WITH ESOPHAGITIS WITHOUT HEMORRHAGE: ICD-10-CM

## 2024-12-30 DIAGNOSIS — M18.0 ARTHRITIS OF CARPOMETACARPAL (CMC) JOINT OF BOTH THUMBS: ICD-10-CM

## 2024-12-30 DIAGNOSIS — G89.29 CHRONIC BILATERAL LOW BACK PAIN WITHOUT SCIATICA: ICD-10-CM

## 2024-12-30 DIAGNOSIS — F41.9 ANXIETY: ICD-10-CM

## 2024-12-30 DIAGNOSIS — G47.00 INSOMNIA, UNSPECIFIED TYPE: ICD-10-CM

## 2024-12-30 DIAGNOSIS — E78.00 PURE HYPERCHOLESTEROLEMIA: Primary | ICD-10-CM

## 2024-12-30 DIAGNOSIS — L40.9 PSORIASIS: ICD-10-CM

## 2024-12-30 DIAGNOSIS — M54.50 CHRONIC BILATERAL LOW BACK PAIN WITHOUT SCIATICA: ICD-10-CM

## 2024-12-30 PROCEDURE — 99214 OFFICE O/P EST MOD 30 MIN: CPT | Performed by: FAMILY MEDICINE

## 2024-12-30 RX ORDER — HYDROCODONE BITARTRATE AND ACETAMINOPHEN 5; 325 MG/1; MG/1
1 TABLET ORAL EVERY 6 HOURS PRN
COMMUNITY
End: 2025-01-02 | Stop reason: SDUPTHER

## 2024-12-30 RX ORDER — HYDROCODONE BITARTRATE AND ACETAMINOPHEN 5; 325 MG/1; MG/1
TABLET ORAL
COMMUNITY
Start: 2024-11-27 | End: 2024-12-30

## 2024-12-30 ASSESSMENT — ENCOUNTER SYMPTOMS
BACK PAIN: 1
EYES NEGATIVE: 1
GASTROINTESTINAL NEGATIVE: 1
RESPIRATORY NEGATIVE: 1

## 2024-12-30 NOTE — PROGRESS NOTES
Subjective:      Patient ID: Daija Cha is a 59 y.o. female.    HPI   Routine follow up on chronic medical conditions, refills, and review of updated labs.  I have reviewed the patient's medical history in detail and updated the computerized patient record.   Review of pain issues.  Ongoing back pain concerns.  Some neck pain ongoing.  Seeing chiropractor for back and hip adjustments.    Left knee pain right hip pains as well.  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    Living with her son Ton, his wife and 2 grandchildren.  Psoriasis rash with improvement, healed up.  Seeing derm and getting injectable that is works well.  Topical for scalp rash.  No headache issues.  Quit smoking over 3 years now.  Remains compliant.  Cut back on alcohol.  Social use. Not using marijuana in years.  She is using norco for persistent hand arthralgias    Past Medical History:   Diagnosis Date    Alcohol abuse     Carpal tunnel syndrome, bilateral     Chronic back pain     Depression with anxiety     HGSIL (high grade squamous intraepithelial lesion) on Pap smear     Insomnia     Migraine     Recreational drug use     Marijuana.    Tobacco abuse      Past Surgical History:   Procedure Laterality Date    ARTHROPLASTY Right 2022    Right Taylor's ARTHROPLASTY -123 performed by Laura Angulo MD at [facility] OR    CARPAL TUNNEL RELEASE Right 2022    Right Carpal & Cubital Tunnel Releases with ulnar nerve transposition  performed by Laura Angulo MD at [facility] OR    CARPAL TUNNEL RELEASE Left 10/20/2023    [facility] Dr. Angulo     SECTION  1992    COLPOSCOPY  2002    With laser cone biopsy of the cervix.    TUBAL LIGATION      US BREAST BIOPSY W LOC DEVICE 1ST LESION LEFT Left 2023    US BREAST BIOPSY W LOC DEVICE 1ST LESION LEFT 3/27/2023 [facility] ULTRASOUND     Current Outpatient Medications   Medication Sig Dispense

## 2025-01-02 ENCOUNTER — PATIENT MESSAGE (OUTPATIENT)
Dept: FAMILY MEDICINE CLINIC | Age: 60
End: 2025-01-02

## 2025-01-02 DIAGNOSIS — G89.29 CHRONIC BILATERAL LOW BACK PAIN WITHOUT SCIATICA: Primary | ICD-10-CM

## 2025-01-02 DIAGNOSIS — M54.50 CHRONIC BILATERAL LOW BACK PAIN WITHOUT SCIATICA: ICD-10-CM

## 2025-01-02 DIAGNOSIS — M54.50 CHRONIC BILATERAL LOW BACK PAIN WITHOUT SCIATICA: Primary | ICD-10-CM

## 2025-01-02 DIAGNOSIS — G89.29 CHRONIC BILATERAL LOW BACK PAIN WITHOUT SCIATICA: ICD-10-CM

## 2025-01-02 RX ORDER — HYDROCODONE BITARTRATE AND ACETAMINOPHEN 5; 325 MG/1; MG/1
1 TABLET ORAL EVERY 6 HOURS PRN
Qty: 60 TABLET | Refills: 0 | Status: SHIPPED | OUTPATIENT
Start: 2025-01-02 | End: 2025-02-01

## 2025-01-02 NOTE — TELEPHONE ENCOUNTER
Medical Necessity Clause: This procedure was medically necessary because the lesion that was treated was: Refill sent to PCP.   Anesthesia Volume In Cc: 0.3 Post-Care Instructions: I reviewed with the patient in detail post-care instructions. Patient is to keep the biopsy site dry overnight, and then apply bacitracin twice daily until healed. Patient may apply hydrogen peroxide soaks to remove any crusting. Anesthesia Type: 1% lidocaine with epinephrine Bill For Surgical Tray: no Detail Level: Detailed Notification Instructions: Patient will be notified of biopsy results. However, patient instructed to call the office if not contacted within 2 weeks. Size Of Lesion In Cm (Required): 0.6 Wound Care: Bacitracin Lab Facility: [address] [place] [address] Was A Bandage Applied: Yes Medical Necessity Information: It is in your best interest to select a reason for this procedure from the list below. All of these items fulfill various CMS LCD requirements except the new and changing color options. Consent was obtained from the patient. The risks and benefits to therapy were discussed in detail. Specifically, the risks of infection, scarring, bleeding, prolonged wound healing, incomplete removal, allergy to anesthesia, nerve injury and recurrence were addressed. Prior to the procedure, the treatment site was clearly identified and confirmed by the patient. All components of Universal Protocol/PAUSE Rule completed. Biopsy Method: 15 blade Path Notes (To The Dermatopathologist): Size: 0.6 x 0.6 Lab: [id number] [facility] Billing Type: [employer] Body Location Override (Optional - Billing Will Still Be Based On Selected Body Map Location If Applicable): right lateral lower back Hemostasis: Electrodesiccation

## 2025-01-02 NOTE — TELEPHONE ENCOUNTER
Daija called requesting a refill of the below medication which has been pended for you:   Per [facility], last refill 12/02 #60 for 30 days  Requested Prescriptions      No prescriptions requested or ordered in this encounter       Last Appointment Date: 12/30/2024  Next Appointment Date: 6/30/2025    Allergies   Allergen Reactions    Naproxen      Nausea/shaky feeling    Requip [Ropinirole] Other (See Comments)     Hypotension and syncope.

## 2025-01-03 RX ORDER — HYDROCODONE BITARTRATE AND ACETAMINOPHEN 5; 325 MG/1; MG/1
1 TABLET ORAL EVERY 6 HOURS PRN
Qty: 60 TABLET | Refills: 0 | OUTPATIENT
Start: 2025-01-03 | End: 2025-02-02

## 2025-01-17 DIAGNOSIS — F41.9 ANXIETY: ICD-10-CM

## 2025-01-17 RX ORDER — ALPRAZOLAM 1 MG/1
TABLET ORAL
Qty: 60 TABLET | Refills: 0 | Status: SHIPPED | OUTPATIENT
Start: 2025-01-19 | End: 2025-02-11

## 2025-01-17 NOTE — TELEPHONE ENCOUNTER
Per [facility], last fill 12/20/2024, quantity 60 for 30 days.   Daija called requesting a refill of the below medication which has been pended for you:     Requested Prescriptions     Pending Prescriptions Disp Refills    ALPRAZolam (XANAX) 1 MG tablet 60 tablet 0     Sig: take 1 tablet by mouth twice a day       Last Appointment Date: 12/30/2024  Next Appointment Date: 6/30/2025    Allergies   Allergen Reactions    Naproxen      Nausea/shaky feeling    Requip [Ropinirole] Other (See Comments)     Hypotension and syncope.

## 2025-01-20 DIAGNOSIS — F41.9 ANXIETY: ICD-10-CM

## 2025-01-20 RX ORDER — ALPRAZOLAM 1 MG/1
TABLET ORAL
Qty: 60 TABLET | Refills: 0 | OUTPATIENT
Start: 2025-01-20 | End: 2025-02-12

## 2025-01-31 DIAGNOSIS — G89.29 CHRONIC BILATERAL LOW BACK PAIN WITHOUT SCIATICA: ICD-10-CM

## 2025-01-31 DIAGNOSIS — M54.50 CHRONIC BILATERAL LOW BACK PAIN WITHOUT SCIATICA: ICD-10-CM

## 2025-01-31 NOTE — TELEPHONE ENCOUNTER
Per [facility], last fill 01/02/2025, quantity 60 for 30 days.   Daija called requesting a refill of the below medication which has been pended for you:     Requested Prescriptions     Pending Prescriptions Disp Refills    HYDROcodone-acetaminophen (NORCO) 5-325 MG per tablet 60 tablet 0     Sig: Take 1 tablet by mouth every 6 hours as needed for Pain for up to 30 days. Max Daily Amount: 4 tablets       Last Appointment Date: 12/30/2024  Next Appointment Date: 6/30/2025    Allergies   Allergen Reactions    Naproxen      Nausea/shaky feeling    Requip [Ropinirole] Other (See Comments)     Hypotension and syncope.

## 2025-02-03 RX ORDER — HYDROCODONE BITARTRATE AND ACETAMINOPHEN 5; 325 MG/1; MG/1
1 TABLET ORAL EVERY 6 HOURS PRN
Qty: 60 TABLET | Refills: 0 | Status: SHIPPED | OUTPATIENT
Start: 2025-02-03 | End: 2025-03-05

## 2025-02-15 DIAGNOSIS — G62.9 NEUROPATHY: ICD-10-CM

## 2025-02-17 ENCOUNTER — PATIENT MESSAGE (OUTPATIENT)
Dept: FAMILY MEDICINE CLINIC | Age: 60
End: 2025-02-17

## 2025-02-17 DIAGNOSIS — F41.9 ANXIETY: ICD-10-CM

## 2025-02-17 RX ORDER — ALPRAZOLAM 1 MG/1
TABLET ORAL
Qty: 60 TABLET | Refills: 0 | Status: SHIPPED | OUTPATIENT
Start: 2025-02-17 | End: 2025-03-12

## 2025-02-17 RX ORDER — GABAPENTIN 100 MG/1
100 CAPSULE ORAL 2 TIMES DAILY
Qty: 180 CAPSULE | Refills: 0 | Status: SHIPPED | OUTPATIENT
Start: 2025-02-17 | End: 2025-05-18

## 2025-02-17 NOTE — TELEPHONE ENCOUNTER
Daija called requesting a refill of the below medication which has been pended for you:   Per [facility], last refill 11/11 #180 for 90 days  Requested Prescriptions     Pending Prescriptions Disp Refills    gabapentin (NEURONTIN) 100 MG capsule [Pharmacy Med Name: GABAPENTIN 100MG CAPSULES] 180 capsule 0     Sig: Take 1 capsule by mouth 2 times daily for 90 days.       Last Appointment Date: 12/30/2024  Next Appointment Date: 6/30/2025    Allergies   Allergen Reactions    Naproxen      Nausea/shaky feeling    Requip [Ropinirole] Other (See Comments)     Hypotension and syncope.

## 2025-02-17 NOTE — TELEPHONE ENCOUNTER
Daija called requesting a refill of the below medication which has been pended for you:   Per [facility], last refill 01/21 #60 for 30 days  Requested Prescriptions     Pending Prescriptions Disp Refills    ALPRAZolam (XANAX) 1 MG tablet 60 tablet 0     Sig: take 1 tablet by mouth twice a day       Last Appointment Date: 12/30/2024  Next Appointment Date: 6/30/2025    Allergies   Allergen Reactions    Naproxen      Nausea/shaky feeling    Requip [Ropinirole] Other (See Comments)     Hypotension and syncope.

## 2025-02-24 DIAGNOSIS — F41.9 ANXIETY: ICD-10-CM

## 2025-02-24 RX ORDER — ALPRAZOLAM 1 MG/1
TABLET ORAL
Qty: 60 TABLET | Refills: 0 | OUTPATIENT
Start: 2025-02-24 | End: 2025-03-19

## 2025-02-26 ENCOUNTER — OFFICE VISIT (OUTPATIENT)
Dept: PRIMARY CARE CLINIC | Age: 60
End: 2025-02-26

## 2025-02-26 VITALS
DIASTOLIC BLOOD PRESSURE: 78 MMHG | SYSTOLIC BLOOD PRESSURE: 122 MMHG | BODY MASS INDEX: 29.3 KG/M2 | HEART RATE: 105 BPM | OXYGEN SATURATION: 96 % | TEMPERATURE: 99.5 F | WEIGHT: 160.2 LBS

## 2025-02-26 DIAGNOSIS — J10.1 INFLUENZA A: ICD-10-CM

## 2025-02-26 DIAGNOSIS — R05.9 COUGH, UNSPECIFIED TYPE: Primary | ICD-10-CM

## 2025-02-26 LAB
INFLUENZA A ANTIGEN, POC: POSITIVE
INFLUENZA B ANTIGEN, POC: NEGATIVE
LOT EXPIRE DATE: ABNORMAL
LOT KIT NUMBER: ABNORMAL
SARS-COV-2, POC: ABNORMAL
VALID INTERNAL CONTROL: ABNORMAL
VENDOR AND KIT NAME POC: ABNORMAL

## 2025-02-26 NOTE — PROGRESS NOTES
Subjective:      Patient ID: Daija Cha is a 60 y.o. female coming in for   Chief Complaint   Patient presents with    Cough     Cough, body aches, fever since Monday         History of Present Illness  The patient is a 60-year-old female who presents to urgent care with influenza-like symptoms, ongoing for 2 to 3 days.    She reports experiencing severe cough, headache, muscle aches, and alternating sensations of heat and cold. Additionally, she describes a feeling of congestion in her ears. These symptoms began on Monday. She has not sought any pharmacological intervention for her symptoms, opting instead for rest and hydration. She has missed work due to her illness and requires a note for her employer. She has no history of respiratory conditions such as asthma or COPD.      Review of Systems     Objective:/78   Pulse (!) 105   Temp 99.5 °F (37.5 °C)   Wt 72.7 kg (160 lb 3.2 oz)   LMP 08/01/2017 (Within Months)   SpO2 96%   BMI 29.30 kg/m²      Physical Exam  Vitals and nursing note reviewed.   Constitutional:       General: She is not in acute distress.     Appearance: Normal appearance. She is not ill-appearing.   HENT:      Head: Normocephalic.      Nose: Congestion and rhinorrhea present.      Mouth/Throat:      Mouth: Mucous membranes are moist.      Pharynx: Oropharynx is clear.   Cardiovascular:      Rate and Rhythm: Normal rate and regular rhythm.      Heart sounds: Normal heart sounds.   Pulmonary:      Effort: Pulmonary effort is normal.      Breath sounds: Normal breath sounds.   Musculoskeletal:      Cervical back: Neck supple.      Right lower leg: No edema.      Left lower leg: No edema.   Skin:     General: Skin is warm and dry.      Findings: No rash.   Neurological:      General: No focal deficit present.      Mental Status: She is alert and oriented to person, place, and time.          Assessment:      1. Cough, unspecified type    2. Influenza A       Results  Laboratory

## 2025-02-27 ENCOUNTER — PATIENT MESSAGE (OUTPATIENT)
Dept: FAMILY MEDICINE CLINIC | Age: 60
End: 2025-02-27

## 2025-03-17 ENCOUNTER — PATIENT MESSAGE (OUTPATIENT)
Dept: FAMILY MEDICINE CLINIC | Age: 60
End: 2025-03-17

## 2025-03-17 DIAGNOSIS — F41.9 ANXIETY: ICD-10-CM

## 2025-03-17 RX ORDER — ALPRAZOLAM 1 MG/1
TABLET ORAL
Qty: 60 TABLET | Refills: 0 | Status: SHIPPED | OUTPATIENT
Start: 2025-03-25 | End: 2025-04-09

## 2025-03-24 DIAGNOSIS — E78.00 PURE HYPERCHOLESTEROLEMIA: Primary | ICD-10-CM

## 2025-03-25 DIAGNOSIS — F41.9 ANXIETY: ICD-10-CM

## 2025-03-25 DIAGNOSIS — E78.00 PURE HYPERCHOLESTEROLEMIA: ICD-10-CM

## 2025-03-25 RX ORDER — SIMVASTATIN 20 MG
20 TABLET ORAL NIGHTLY
Qty: 90 TABLET | Refills: 1 | OUTPATIENT
Start: 2025-03-25

## 2025-03-25 RX ORDER — SIMVASTATIN 20 MG
20 TABLET ORAL NIGHTLY
Qty: 90 TABLET | Refills: 1 | Status: SHIPPED | OUTPATIENT
Start: 2025-03-25

## 2025-03-25 RX ORDER — ALPRAZOLAM 1 MG/1
TABLET ORAL
Qty: 60 TABLET | Refills: 0 | OUTPATIENT
Start: 2025-03-25 | End: 2025-04-09

## 2025-03-25 NOTE — TELEPHONE ENCOUNTER
Daija called requesting a refill of the below medication which has been pended for you:     Requested Prescriptions     Pending Prescriptions Disp Refills    simvastatin (ZOCOR) 20 MG tablet 90 tablet 1     Sig: Take 1 tablet by mouth nightly at bedtime.       Last Appointment Date: 12/30/2024  Next Appointment Date: 6/30/2025    Allergies   Allergen Reactions    Naproxen      Nausea/shaky feeling    Requip [Ropinirole] Other (See Comments)     Hypotension and syncope.

## 2025-04-09 ENCOUNTER — PATIENT MESSAGE (OUTPATIENT)
Dept: FAMILY MEDICINE CLINIC | Age: 60
End: 2025-04-09

## 2025-04-09 DIAGNOSIS — G89.29 CHRONIC BILATERAL LOW BACK PAIN WITHOUT SCIATICA: ICD-10-CM

## 2025-04-09 DIAGNOSIS — M54.50 CHRONIC BILATERAL LOW BACK PAIN WITHOUT SCIATICA: ICD-10-CM

## 2025-04-09 RX ORDER — HYDROCODONE BITARTRATE AND ACETAMINOPHEN 5; 325 MG/1; MG/1
1 TABLET ORAL EVERY 6 HOURS PRN
Qty: 60 TABLET | Refills: 0 | Status: SHIPPED | OUTPATIENT
Start: 2025-04-09 | End: 2025-05-09

## 2025-04-09 RX ORDER — TRIAMCINOLONE ACETONIDE 1 MG/G
CREAM TOPICAL
Qty: 80 G | Refills: 2 | Status: SHIPPED | OUTPATIENT
Start: 2025-04-09

## 2025-04-09 NOTE — TELEPHONE ENCOUNTER
Daija called requesting a refill of the below medication which has been pended for you:     Requested Prescriptions     Pending Prescriptions Disp Refills    triamcinolone (KENALOG) 0.1 % cream 80 g 2     Sig: Apply topically 2 times daily.       Last Appointment Date: 12/30/2024  Next Appointment Date: 6/30/2025    Allergies   Allergen Reactions    Naproxen      Nausea/shaky feeling    Requip [Ropinirole] Other (See Comments)     Hypotension and syncope.

## 2025-04-14 ENCOUNTER — HOSPITAL ENCOUNTER (OUTPATIENT)
Age: 60
Discharge: HOME | End: 2025-04-14
Payer: COMMERCIAL

## 2025-04-14 DIAGNOSIS — K21.00 GASTROESOPHAGEAL REFLUX DISEASE WITH ESOPHAGITIS WITHOUT HEMORRHAGE: ICD-10-CM

## 2025-04-14 DIAGNOSIS — E78.00 PURE HYPERCHOLESTEROLEMIA: ICD-10-CM

## 2025-04-14 DIAGNOSIS — R73.01 IMPAIRED FASTING BLOOD SUGAR: ICD-10-CM

## 2025-04-14 LAB
ALBUMIN SERPL-MCNC: 4.2 G/DL (ref 3.5–5.2)
ALBUMIN/GLOB SERPL: 1.6 {RATIO} (ref 1–2.5)
ALP SERPL-CCNC: 80 U/L (ref 35–104)
ALT SERPL-CCNC: 60 U/L (ref 10–35)
ANION GAP SERPL CALCULATED.3IONS-SCNC: 12 MMOL/L (ref 9–16)
AST SERPL-CCNC: 37 U/L (ref 10–35)
BASOPHILS # BLD: <0.03 K/UL (ref 0–0.2)
BASOPHILS NFR BLD: 1 % (ref 0–2)
BILIRUB SERPL-MCNC: 0.4 MG/DL (ref 0–1.2)
BUN SERPL-MCNC: 21 MG/DL (ref 8–23)
BUN/CREAT SERPL: 26 (ref 9–20)
CALCIUM SERPL-MCNC: 9.4 MG/DL (ref 8.6–10.4)
CHLORIDE SERPL-SCNC: 105 MMOL/L (ref 98–107)
CHOLEST SERPL-MCNC: 208 MG/DL (ref 0–199)
CHOLESTEROL/HDL RATIO: 4
CO2 SERPL-SCNC: 24 MMOL/L (ref 20–31)
CREAT SERPL-MCNC: 0.8 MG/DL (ref 0.6–0.9)
EOSINOPHIL # BLD: 0.14 K/UL (ref 0–0.44)
EOSINOPHILS RELATIVE PERCENT: 4 % (ref 1–4)
ERYTHROCYTE [DISTWIDTH] IN BLOOD BY AUTOMATED COUNT: 12.2 % (ref 11.8–14.4)
EST. AVERAGE GLUCOSE BLD GHB EST-MCNC: 97 MG/DL
GFR, ESTIMATED: 84 ML/MIN/1.73M2
GLUCOSE SERPL-MCNC: 104 MG/DL (ref 74–99)
HBA1C MFR BLD: 5 % (ref 4–6)
HCT VFR BLD AUTO: 42.8 % (ref 36.3–47.1)
HDLC SERPL-MCNC: 52 MG/DL
HGB BLD-MCNC: 14 G/DL (ref 11.9–15.1)
IMM GRANULOCYTES # BLD AUTO: <0.03 K/UL (ref 0–0.3)
IMM GRANULOCYTES NFR BLD: 0 %
LDLC SERPL CALC-MCNC: 116 MG/DL (ref 0–100)
LYMPHOCYTES NFR BLD: 1.25 K/UL (ref 1.1–3.7)
LYMPHOCYTES RELATIVE PERCENT: 32 % (ref 24–43)
MCH RBC QN AUTO: 31.5 PG (ref 25.2–33.5)
MCHC RBC AUTO-ENTMCNC: 32.7 G/DL (ref 25.2–33.5)
MCV RBC AUTO: 96.4 FL (ref 82.6–102.9)
MONOCYTES NFR BLD: 0.41 K/UL (ref 0.1–1.2)
MONOCYTES NFR BLD: 10 % (ref 3–12)
NEUTROPHILS NFR BLD: 53 % (ref 36–65)
NEUTS SEG NFR BLD: 2.1 K/UL (ref 1.5–8.1)
NRBC BLD-RTO: 0 PER 100 WBC
PLATELET # BLD AUTO: 248 K/UL (ref 138–453)
PMV BLD AUTO: 9 FL (ref 8.1–13.5)
POTASSIUM SERPL-SCNC: 3.9 MMOL/L (ref 3.7–5.3)
PROT SERPL-MCNC: 6.9 G/DL (ref 6.6–8.7)
RBC # BLD AUTO: 4.44 M/UL (ref 3.95–5.11)
SODIUM SERPL-SCNC: 141 MMOL/L (ref 136–145)
TRIGL SERPL-MCNC: 198 MG/DL
VLDLC SERPL CALC-MCNC: 40 MG/DL (ref 1–30)
WBC OTHER # BLD: 3.9 K/UL (ref 3.5–11.3)

## 2025-04-14 PROCEDURE — 80053 COMPREHEN METABOLIC PANEL: CPT

## 2025-04-14 PROCEDURE — 85025 COMPLETE CBC W/AUTO DIFF WBC: CPT

## 2025-04-14 PROCEDURE — 36415 COLL VENOUS BLD VENIPUNCTURE: CPT

## 2025-04-14 PROCEDURE — 80061 LIPID PANEL: CPT

## 2025-04-14 PROCEDURE — 83036 HEMOGLOBIN GLYCOSYLATED A1C: CPT

## 2025-04-18 ENCOUNTER — RESULTS FOLLOW-UP (OUTPATIENT)
Dept: FAMILY MEDICINE CLINIC | Age: 60
End: 2025-04-18

## 2025-04-21 ENCOUNTER — HOSPITAL ENCOUNTER (OUTPATIENT)
Age: 60
Discharge: HOME | End: 2025-04-21
Payer: COMMERCIAL

## 2025-04-21 ENCOUNTER — OFFICE VISIT (OUTPATIENT)
Dept: PRIMARY CARE CLINIC | Age: 60
End: 2025-04-21
Payer: COMMERCIAL

## 2025-04-21 VITALS
SYSTOLIC BLOOD PRESSURE: 112 MMHG | DIASTOLIC BLOOD PRESSURE: 80 MMHG | HEART RATE: 77 BPM | OXYGEN SATURATION: 97 % | TEMPERATURE: 97.7 F

## 2025-04-21 DIAGNOSIS — R30.0 DYSURIA: ICD-10-CM

## 2025-04-21 DIAGNOSIS — N39.0 ACUTE UTI: Primary | ICD-10-CM

## 2025-04-21 LAB
BACTERIA URNS QL MICRO: ABNORMAL
BILIRUB UR QL STRIP: NEGATIVE
CASTS #/AREA URNS LPF: ABNORMAL /LPF (ref 0–2)
CASTS #/AREA URNS LPF: ABNORMAL /LPF (ref 0–2)
CHARACTER UR: ABNORMAL
CLARITY UR: ABNORMAL
COLOR UR: YELLOW
EPI CELLS #/AREA URNS HPF: ABNORMAL /HPF (ref 0–5)
GLUCOSE UR STRIP-MCNC: NEGATIVE MG/DL
HGB UR QL STRIP.AUTO: ABNORMAL
KETONES UR STRIP-MCNC: NEGATIVE MG/DL
LEUKOCYTE ESTERASE UR QL STRIP: NEGATIVE
MUCOUS THREADS URNS QL MICRO: ABNORMAL
NITRITE UR QL STRIP: NEGATIVE
PH UR STRIP: 5.5 [PH] (ref 5–6)
PROT UR STRIP-MCNC: NEGATIVE MG/DL
RBC #/AREA URNS HPF: ABNORMAL /HPF (ref 0–4)
SP GR UR STRIP: 1.03 (ref 1.01–1.02)
UROBILINOGEN UR STRIP-ACNC: NORMAL EU/DL (ref 0–1)
WBC #/AREA URNS HPF: ABNORMAL /HPF (ref 0–4)

## 2025-04-21 PROCEDURE — 81001 URINALYSIS AUTO W/SCOPE: CPT

## 2025-04-21 PROCEDURE — 99213 OFFICE O/P EST LOW 20 MIN: CPT | Performed by: FAMILY MEDICINE

## 2025-04-21 RX ORDER — PHENAZOPYRIDINE HYDROCHLORIDE 200 MG/1
200 TABLET, FILM COATED ORAL 3 TIMES DAILY PRN
Qty: 21 TABLET | Refills: 0 | Status: SHIPPED | OUTPATIENT
Start: 2025-04-21 | End: 2025-04-28

## 2025-04-21 RX ORDER — SULFAMETHOXAZOLE AND TRIMETHOPRIM 800; 160 MG/1; MG/1
1 TABLET ORAL 2 TIMES DAILY
Qty: 20 TABLET | Refills: 0 | Status: SHIPPED | OUTPATIENT
Start: 2025-04-21 | End: 2025-05-01

## 2025-04-21 ASSESSMENT — ENCOUNTER SYMPTOMS
RESPIRATORY NEGATIVE: 1
GASTROINTESTINAL NEGATIVE: 1
EYES NEGATIVE: 1

## 2025-04-21 NOTE — PROGRESS NOTES
08/01/2017 (Within Months)   SpO2 97%   Hospital Outpatient Visit on 04/21/2025   Component Date Value Ref Range Status    Color, UA 04/21/2025 Yellow  Yellow Final    Turbidity UA 04/21/2025 SLIGHTLY CLOUDY (A)  Clear Final    Glucose, Ur 04/21/2025 NEGATIVE  NEGATIVE mg/dL Final    Bilirubin, Urine 04/21/2025 NEGATIVE  NEGATIVE Final    Ketones, Urine 04/21/2025 NEGATIVE  NEGATIVE mg/dL Final    Specific Gravity, UA 04/21/2025 1.030 (H)  1.010 - 1.025 Final    Urine Hgb 04/21/2025 1+ (A)  NEGATIVE Final    pH, Urine 04/21/2025 5.5  5.0 - 6.0 Final    Protein, UA 04/21/2025 NEGATIVE  NEGATIVE mg/dL Final    Urobilinogen, Urine 04/21/2025 Normal  0.0 - 1.0 EU/dL Final    Nitrite, Urine 04/21/2025 NEGATIVE  NEGATIVE Final    Leukocyte Esterase, Urine 04/21/2025 NEGATIVE  NEGATIVE Final    WBC, UA 04/21/2025 0 TO 2  0 - 4 /HPF Final    RBC, UA 04/21/2025 5 TO 10  0 - 4 /HPF Final    Casts UA 04/21/2025 0 TO 2  0 - 2 /LPF Final    Casts UA 04/21/2025 HYALINE  0 - 2 /LPF Final    Epithelial Cells, UA 04/21/2025 2 TO 5  0 - 5 /HPF Final    Bacteria, UA 04/21/2025 MODERATE (A)  None Final    Mucus, UA 04/21/2025 3+ (A)  None Final    Other Observations UA 04/21/2025  (A)  NOT REQ. Final                    Value:Utilizing a urinalysis as the only screening method to exclude a potential uropathogen can be unreliable in many patient populations.  Rapid screening tests are less sensitive than culture and if UTI is a clinical possibility, culture should be considered despite a negative urinalysis.         Assessment:      Encounter Diagnoses   Name Primary?    Dysuria     Acute UTI Yes           Plan:   Bactrim ds bid x 7 days  Pyridium tid prn dysuria .  Increase fluids  Recheck prn         Diego Weems MD

## 2025-04-28 ENCOUNTER — PATIENT MESSAGE (OUTPATIENT)
Dept: FAMILY MEDICINE CLINIC | Age: 60
End: 2025-04-28

## 2025-04-28 DIAGNOSIS — F41.9 ANXIETY: ICD-10-CM

## 2025-04-28 DIAGNOSIS — N39.0 ACUTE UTI: Primary | ICD-10-CM

## 2025-04-28 NOTE — TELEPHONE ENCOUNTER
Daija called requesting a refill of the below medication which has been pended for you:   Per [facility], last refill 03/29 #60 for 30 days   Requested Prescriptions     Pending Prescriptions Disp Refills    ALPRAZolam (XANAX) 1 MG tablet 60 tablet 0     Sig: take 1 tablet by mouth twice a day       Last Appointment Date: 4/21/2025  Next Appointment Date: 6/30/2025    Allergies   Allergen Reactions    Naproxen      Nausea/shaky feeling    Requip [Ropinirole] Other (See Comments)     Hypotension and syncope.

## 2025-04-28 NOTE — TELEPHONE ENCOUNTER
Daija called requesting a refill of the below medication which has been pended for you:     Requested Prescriptions     Pending Prescriptions Disp Refills    sulfamethoxazole-trimethoprim (BACTRIM DS) 800-160 MG per tablet 5 tablet 0     Sig: Take 1 tablet by mouth 2 times daily for 10 days Take with food.    phenazopyridine (PYRIDIUM) 200 MG tablet 15 tablet 0     Sig: Take 1 tablet by mouth 3 times daily as needed for Pain       Last Appointment Date: 4/21/2025  Next Appointment Date: 4/28/2025

## 2025-04-29 ENCOUNTER — PATIENT MESSAGE (OUTPATIENT)
Dept: FAMILY MEDICINE CLINIC | Age: 60
End: 2025-04-29

## 2025-04-29 RX ORDER — SULFAMETHOXAZOLE AND TRIMETHOPRIM 800; 160 MG/1; MG/1
1 TABLET ORAL 2 TIMES DAILY
Qty: 5 TABLET | Refills: 0 | Status: SHIPPED | OUTPATIENT
Start: 2025-04-29 | End: 2025-05-09

## 2025-04-29 RX ORDER — ALPRAZOLAM 1 MG/1
TABLET ORAL
Qty: 60 TABLET | Refills: 0 | Status: SHIPPED | OUTPATIENT
Start: 2025-04-29 | End: 2025-05-13

## 2025-04-29 RX ORDER — PHENAZOPYRIDINE HYDROCHLORIDE 200 MG/1
200 TABLET, FILM COATED ORAL 3 TIMES DAILY PRN
Qty: 15 TABLET | Refills: 0 | Status: SHIPPED | OUTPATIENT
Start: 2025-04-29 | End: 2025-05-04

## 2025-05-08 DIAGNOSIS — G89.29 CHRONIC BILATERAL LOW BACK PAIN WITHOUT SCIATICA: ICD-10-CM

## 2025-05-08 DIAGNOSIS — M54.50 CHRONIC BILATERAL LOW BACK PAIN WITHOUT SCIATICA: ICD-10-CM

## 2025-05-08 NOTE — TELEPHONE ENCOUNTER
Per [facility], last fill 4/9/25, quantity 60 for 30 days.   Daija called requesting a refill of the below medication which has been pended for you: DUE 5/9/25    Requested Prescriptions     Pending Prescriptions Disp Refills    HYDROcodone-acetaminophen (NORCO) 5-325 MG per tablet 60 tablet 0     Sig: Take 1 tablet by mouth every 6 hours as needed for Pain for up to 30 days. Max Daily Amount: 4 tablets       Last Appointment Date: 4/21/2025  Next Appointment Date: 6/30/2025    Allergies   Allergen Reactions    Naproxen      Nausea/shaky feeling    Requip [Ropinirole] Other (See Comments)     Hypotension and syncope.

## 2025-05-09 RX ORDER — HYDROCODONE BITARTRATE AND ACETAMINOPHEN 5; 325 MG/1; MG/1
1 TABLET ORAL EVERY 6 HOURS PRN
Qty: 60 TABLET | Refills: 0 | Status: SHIPPED | OUTPATIENT
Start: 2025-05-09 | End: 2025-06-08

## 2025-05-19 ENCOUNTER — PATIENT MESSAGE (OUTPATIENT)
Dept: FAMILY MEDICINE CLINIC | Age: 60
End: 2025-05-19

## 2025-05-22 DIAGNOSIS — G62.9 NEUROPATHY: ICD-10-CM

## 2025-05-22 RX ORDER — GABAPENTIN 100 MG/1
100 CAPSULE ORAL 2 TIMES DAILY
Qty: 180 CAPSULE | Refills: 0 | Status: SHIPPED | OUTPATIENT
Start: 2025-05-22 | End: 2025-08-20

## 2025-05-22 NOTE — TELEPHONE ENCOUNTER
Daija called requesting a refill of the below medication which has been pended for you:   Per [facility], last refill 02/17 #180 for 90 days   Requested Prescriptions     Pending Prescriptions Disp Refills    gabapentin (NEURONTIN) 100 MG capsule [Pharmacy Med Name: GABAPENTIN 100MG CAPSULES] 180 capsule 0     Sig: Take 1 capsule by mouth 2 times daily for 90 days.       Last Appointment Date: 4/21/2025  Next Appointment Date: 5/27/2025    Allergies   Allergen Reactions    Naproxen      Nausea/shaky feeling    Requip [Ropinirole] Other (See Comments)     Hypotension and syncope.

## 2025-05-27 ENCOUNTER — OFFICE VISIT (OUTPATIENT)
Dept: FAMILY MEDICINE CLINIC | Age: 60
End: 2025-05-27
Payer: COMMERCIAL

## 2025-05-27 VITALS
HEIGHT: 61 IN | WEIGHT: 161.8 LBS | BODY MASS INDEX: 30.55 KG/M2 | SYSTOLIC BLOOD PRESSURE: 126 MMHG | DIASTOLIC BLOOD PRESSURE: 72 MMHG | HEART RATE: 70 BPM | OXYGEN SATURATION: 98 %

## 2025-05-27 DIAGNOSIS — Z82.3 FAMILY HISTORY OF CEREBROVASCULAR ACCIDENT (CVA) DUE TO ANEURYSM: ICD-10-CM

## 2025-05-27 DIAGNOSIS — G62.9 NEUROPATHY: ICD-10-CM

## 2025-05-27 DIAGNOSIS — F41.9 ANXIETY: ICD-10-CM

## 2025-05-27 DIAGNOSIS — Z87.440 HISTORY OF UTI: Primary | ICD-10-CM

## 2025-05-27 PROCEDURE — 99214 OFFICE O/P EST MOD 30 MIN: CPT | Performed by: FAMILY MEDICINE

## 2025-05-27 RX ORDER — ALPRAZOLAM 1 MG/1
1 TABLET ORAL 2 TIMES DAILY
Qty: 60 TABLET | Refills: 0 | Status: SHIPPED | OUTPATIENT
Start: 2025-05-27 | End: 2025-06-26

## 2025-05-27 RX ORDER — ALPRAZOLAM 1 MG/1
1 TABLET ORAL 2 TIMES DAILY
COMMUNITY
Start: 2025-04-29 | End: 2025-05-27 | Stop reason: SDUPTHER

## 2025-05-27 SDOH — ECONOMIC STABILITY: FOOD INSECURITY: WITHIN THE PAST 12 MONTHS, YOU WORRIED THAT YOUR FOOD WOULD RUN OUT BEFORE YOU GOT MONEY TO BUY MORE.: NEVER TRUE

## 2025-05-27 SDOH — ECONOMIC STABILITY: FOOD INSECURITY: WITHIN THE PAST 12 MONTHS, THE FOOD YOU BOUGHT JUST DIDN'T LAST AND YOU DIDN'T HAVE MONEY TO GET MORE.: NEVER TRUE

## 2025-05-27 NOTE — PROGRESS NOTES
Subjective:      Patient ID: Daija Cha is a 60 y.o. female.    HPI  acute visit to discuss recent uti.  Seen acutely 25 for acute uti.  The only one she ever had.  Treated and resolved at present.  She is concerned that she has never had this prior.      Maternal uncle, brother, and sister all had bleeding strokes.  Brother with aneurysm of the brain.  Sister with bleeding stroke.  She wonders about her risk.      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    Tobacco abuse      Past Surgical History:   Procedure Laterality Date    ARTHROPLASTY Right 2022    Right Taylor's ARTHROPLASTY -123 performed by Laura Angulo MD at [facility] OR    CARPAL TUNNEL RELEASE Right 2022    Right Carpal & Cubital Tunnel Releases with ulnar nerve transposition  performed by Laura Angulo MD at [facility] OR    CARPAL TUNNEL RELEASE Left 10/20/2023    [facility] Dr. Angulo     SECTION  1992    COLPOSCOPY  2002    With laser cone biopsy of the cervix.    TUBAL LIGATION      US BREAST BIOPSY W LOC DEVICE 1ST LESION LEFT Left 2023    US BREAST BIOPSY W LOC DEVICE 1ST LESION LEFT 3/27/2023 [facility] ULTRASOUND     Current Outpatient Medications   Medication Sig Dispense Refill    ALPRAZolam (XANAX) 1 MG tablet Take 1 tablet by mouth 2 times daily.      gabapentin (NEURONTIN) 100 MG capsule Take 1 capsule by mouth 2 times daily for 90 days. 180 capsule 0    HYDROcodone-acetaminophen (NORCO) 5-325 MG per tablet Take 1 tablet by mouth every 6 hours as needed for Pain for up to 30 days. Max Daily Amount: 4 tablets 60 tablet 0    triamcinolone (KENALOG) 0.1 % cream Apply topically 2 times daily. 80 g 2    simvastatin (ZOCOR) 20 MG tablet Take 1 tablet by mouth nightly

## 2025-06-09 ENCOUNTER — PATIENT MESSAGE (OUTPATIENT)
Dept: FAMILY MEDICINE CLINIC | Age: 60
End: 2025-06-09

## 2025-06-09 DIAGNOSIS — G89.29 CHRONIC BILATERAL LOW BACK PAIN WITHOUT SCIATICA: ICD-10-CM

## 2025-06-09 DIAGNOSIS — M54.50 CHRONIC BILATERAL LOW BACK PAIN WITHOUT SCIATICA: ICD-10-CM

## 2025-06-09 RX ORDER — HYDROCODONE BITARTRATE AND ACETAMINOPHEN 5; 325 MG/1; MG/1
1 TABLET ORAL EVERY 6 HOURS PRN
Qty: 60 TABLET | Refills: 0 | Status: SHIPPED | OUTPATIENT
Start: 2025-06-09 | End: 2025-07-09

## 2025-06-09 NOTE — TELEPHONE ENCOUNTER
Daija called requesting a refill of the below medication which has been pended for you:   Per [facility], last refill 04/09 #60 for 30 days  Requested Prescriptions     Pending Prescriptions Disp Refills    HYDROcodone-acetaminophen (NORCO) 5-325 MG per tablet 60 tablet 0     Sig: Take 1 tablet by mouth every 6 hours as needed for Pain for up to 30 days. Max Daily Amount: 4 tablets       Last Appointment Date: 5/27/2025  Next Appointment Date: 12/1/2025    Allergies   Allergen Reactions    Naproxen      Nausea/shaky feeling    Requip [Ropinirole] Other (See Comments)     Hypotension and syncope.

## 2025-06-30 ENCOUNTER — PATIENT MESSAGE (OUTPATIENT)
Dept: FAMILY MEDICINE CLINIC | Age: 60
End: 2025-06-30

## 2025-06-30 DIAGNOSIS — F41.9 ANXIETY: ICD-10-CM

## 2025-06-30 DIAGNOSIS — E78.00 PURE HYPERCHOLESTEROLEMIA: ICD-10-CM

## 2025-06-30 RX ORDER — ALPRAZOLAM 1 MG/1
1 TABLET ORAL 2 TIMES DAILY
Qty: 60 TABLET | Refills: 0 | Status: SHIPPED | OUTPATIENT
Start: 2025-06-30 | End: 2025-07-30

## 2025-06-30 RX ORDER — SIMVASTATIN 20 MG
20 TABLET ORAL NIGHTLY
Qty: 90 TABLET | Refills: 1 | OUTPATIENT
Start: 2025-06-30

## 2025-06-30 NOTE — TELEPHONE ENCOUNTER
Daija called requesting a refill of the below medication which has been pended for you:   Per [facility], last refill 05/27 #60 for 30 days  Requested Prescriptions     Pending Prescriptions Disp Refills    ALPRAZolam (XANAX) 1 MG tablet 60 tablet 0     Sig: Take 1 tablet by mouth 2 times daily for 30 days. Max Daily Amount: 2 mg       Last Appointment Date: 5/27/2025  Next Appointment Date: 12/1/2025    Allergies   Allergen Reactions    Naproxen      Nausea/shaky feeling    Requip [Ropinirole] Other (See Comments)     Hypotension and syncope.

## 2025-07-09 DIAGNOSIS — M54.50 CHRONIC BILATERAL LOW BACK PAIN WITHOUT SCIATICA: ICD-10-CM

## 2025-07-09 DIAGNOSIS — G89.29 CHRONIC BILATERAL LOW BACK PAIN WITHOUT SCIATICA: ICD-10-CM

## 2025-07-09 NOTE — TELEPHONE ENCOUNTER
Per [facility], last fill 6/10/25, quantity 60 for 30 days.   Daija called requesting a refill of the below medication which has been pended for you: DUE 7/10/25    Requested Prescriptions     Pending Prescriptions Disp Refills    HYDROcodone-acetaminophen (NORCO) 5-325 MG per tablet 60 tablet 0     Sig: Take 1 tablet by mouth every 6 hours as needed for Pain for up to 30 days. Max Daily Amount: 4 tablets       Last Appointment Date: 5/27/2025  Next Appointment Date: 12/1/2025    Allergies   Allergen Reactions    Naproxen      Nausea/shaky feeling    Requip [Ropinirole] Other (See Comments)     Hypotension and syncope.

## 2025-07-11 ENCOUNTER — PATIENT MESSAGE (OUTPATIENT)
Dept: FAMILY MEDICINE CLINIC | Age: 60
End: 2025-07-11

## 2025-07-11 RX ORDER — HYDROCODONE BITARTRATE AND ACETAMINOPHEN 5; 325 MG/1; MG/1
1 TABLET ORAL EVERY 6 HOURS PRN
Qty: 60 TABLET | Refills: 0 | Status: SHIPPED | OUTPATIENT
Start: 2025-07-11 | End: 2025-08-10

## 2025-07-11 NOTE — TELEPHONE ENCOUNTER
Controlled substances monitoring: no signs of potential drug abuse or diversion identified and [facility] report reviewed today- activity consistent with treatment plan.

## 2025-07-22 RX ORDER — GABAPENTIN 100 MG/1
200 CAPSULE ORAL 3 TIMES DAILY
Qty: 180 CAPSULE | Refills: 0 | Status: SHIPPED | OUTPATIENT
Start: 2025-07-22 | End: 2025-08-21

## 2025-07-22 NOTE — TELEPHONE ENCOUNTER
Per [facility], last fill 6/21/2025, quantity 180 for 30 days.   Daija called requesting a refill of the below medication which has been pended for you: sent message to come in and sign CSA     Requested Prescriptions     Pending Prescriptions Disp Refills    gabapentin (NEURONTIN) 100 MG capsule [Pharmacy Med Name: GABAPENTIN 100MG CAPSULES] 180 capsule 0     Sig: Take 2 capsules by mouth in the morning, at noon, and at bedtime for 30 days.       Last Appointment Date: 5/27/2025  Next Appointment Date: 12/01/2025    Allergies   Allergen Reactions    Naproxen      Nausea/shaky feeling    Requip [Ropinirole] Other (See Comments)     Hypotension and syncope.

## 2025-07-29 DIAGNOSIS — F41.9 ANXIETY: ICD-10-CM

## 2025-07-29 NOTE — TELEPHONE ENCOUNTER
Per [facility], last fill 6/30/25, quantity 60 for 30 days.   Daija called requesting a refill of the below medication which has been pended for you: DUE 7/20/25    Requested Prescriptions     Pending Prescriptions Disp Refills    ALPRAZolam (XANAX) 1 MG tablet 60 tablet 0     Sig: Take 1 tablet by mouth 2 times daily for 30 days. Max Daily Amount: 2 mg       Last Appointment Date: 5/27/2025  Next Appointment Date: 12/1/2025    Allergies   Allergen Reactions    Naproxen      Nausea/shaky feeling    Requip [Ropinirole] Other (See Comments)     Hypotension and syncope.

## 2025-07-30 RX ORDER — ALPRAZOLAM 1 MG/1
1 TABLET ORAL 2 TIMES DAILY
Qty: 60 TABLET | Refills: 0 | Status: SHIPPED | OUTPATIENT
Start: 2025-07-30 | End: 2025-08-29

## 2025-08-08 DIAGNOSIS — G89.29 CHRONIC BILATERAL LOW BACK PAIN WITHOUT SCIATICA: ICD-10-CM

## 2025-08-08 DIAGNOSIS — M54.50 CHRONIC BILATERAL LOW BACK PAIN WITHOUT SCIATICA: ICD-10-CM

## 2025-08-11 RX ORDER — HYDROCODONE BITARTRATE AND ACETAMINOPHEN 5; 325 MG/1; MG/1
1 TABLET ORAL EVERY 6 HOURS PRN
Qty: 60 TABLET | Refills: 0 | Status: SHIPPED | OUTPATIENT
Start: 2025-08-11 | End: 2025-09-10

## 2025-09-03 ENCOUNTER — PATIENT MESSAGE (OUTPATIENT)
Dept: FAMILY MEDICINE CLINIC | Age: 60
End: 2025-09-03

## 2025-09-03 DIAGNOSIS — F41.9 ANXIETY: ICD-10-CM

## 2025-09-03 RX ORDER — GABAPENTIN 100 MG/1
200 CAPSULE ORAL 3 TIMES DAILY
Qty: 180 CAPSULE | Refills: 0 | Status: SHIPPED | OUTPATIENT
Start: 2025-09-03 | End: 2025-10-03

## 2025-09-03 RX ORDER — ALPRAZOLAM 1 MG/1
1 TABLET ORAL 2 TIMES DAILY
Qty: 60 TABLET | Refills: 0 | Status: SHIPPED | OUTPATIENT
Start: 2025-09-03 | End: 2025-10-03

## 2025-09-05 RX ORDER — GABAPENTIN 100 MG/1
200 CAPSULE ORAL 3 TIMES DAILY
Qty: 180 CAPSULE | Refills: 0 | OUTPATIENT
Start: 2025-09-05 | End: 2025-10-05

## (undated) DEVICE — SUTURE VCRL SZ 0 L27IN ABSRB UD L26MM CP-2 1/2 CIR SGL J870H

## (undated) DEVICE — GLOVE ORANGE PI 7   MSG9070

## (undated) DEVICE — DRAPE,U/ SHT,SPLIT,PLAS,STERIL: Brand: MEDLINE

## (undated) DEVICE — BNDG,ELSTC,MATRIX,STRL,4"X5YD,LF,HOOK&LP: Brand: MEDLINE

## (undated) DEVICE — SUTURE ETHBND EXCEL SZ 2-0 L30IN NONABSORBABLE GRN RB-1 X873H

## (undated) DEVICE — 4-PORT MANIFOLD: Brand: NEPTUNE 2

## (undated) DEVICE — GAUZE,SPONGE,FLUFF,6"X6.75",STRL,5/TRAY: Brand: MEDLINE

## (undated) DEVICE — TUBING, SUCTION, 3/16" X 20', STRAIGHT: Brand: MEDLINE

## (undated) DEVICE — BASIN EMSIS 16OZ GRAPHITE PLAS KID SHP MOLD GRAD FOR ORAL

## (undated) DEVICE — PACK PROCEDURE SURG EXTREMITY MIN

## (undated) DEVICE — STRIP,CLOSURE,WOUND,MEDI-STRIP,1/2X4: Brand: MEDLINE

## (undated) DEVICE — SOLUTION IV IRRIG POUR BRL 0.9% SODIUM CHL 2F7124

## (undated) DEVICE — BNDG,ELSTC,MATRIX,STRL,3"X5YD,LF,HOOK&LP: Brand: MEDLINE

## (undated) DEVICE — DRESSING PETRO W3XL8IN OIL EMUL N ADH GZ KNIT IMPREG CELOS

## (undated) DEVICE — PAD,ABDOMINAL,5"X9",ST,LF,25/BX: Brand: MEDLINE INDUSTRIES, INC.

## (undated) DEVICE — ZIMMER® STERILE DISPOSABLE TOURNIQUET CUFF WITH PLC, DUAL PORT, SINGLE BLADDER, 24 IN. (61 CM)

## (undated) DEVICE — Device

## (undated) DEVICE — PACK SURG PROC REMINDER N WVN DISPOSABLE BEAC TIME OUT

## (undated) DEVICE — BANDAGE,GAUZE,BULKEE II,4.5"X4.1YD,STRL: Brand: MEDLINE

## (undated) DEVICE — SUTURE VCRL SZ 3-0 L18IN ABSRB UD PS-2 L19MM 3/8 CRV PRIM J497H

## (undated) DEVICE — SUTURE ETHLN SZ 4-0 L18IN NONABSORBABLE BLK L13MM P-3 3/8 699G

## (undated) DEVICE — SUTURE VCRL SZ 2-0 L27IN ABSRB UD L26MM SH 1/2 CIR J417H

## (undated) DEVICE — BANDAGE,ELASTIC,ESMARK,STERILE,4"X9',LF: Brand: MEDLINE

## (undated) DEVICE — STERILE HOOK LOCK LATEX FREE ELASTIC BANDAGE 3INX5YD: Brand: HOOK LOCK™

## (undated) DEVICE — GLOVE ORANGE PI 8   MSG9080

## (undated) DEVICE — SUTURE MCRYL SZ 4-0 L18IN ABSRB UD L19MM PS-2 3/8 CIR PRIM Y496G

## (undated) DEVICE — MARKER W/PRE PRINTED CUSTOM LABEL